# Patient Record
Sex: FEMALE | Race: WHITE | NOT HISPANIC OR LATINO | Employment: OTHER | URBAN - METROPOLITAN AREA
[De-identification: names, ages, dates, MRNs, and addresses within clinical notes are randomized per-mention and may not be internally consistent; named-entity substitution may affect disease eponyms.]

---

## 2017-04-25 LAB — HBA1C MFR BLD HPLC: 5.8 %

## 2018-10-04 ENCOUNTER — TRANSCRIBE ORDERS (OUTPATIENT)
Dept: ADMINISTRATIVE | Facility: HOSPITAL | Age: 68
End: 2018-10-04

## 2018-10-04 DIAGNOSIS — G47.30 SLEEP APNEA, UNSPECIFIED TYPE: Primary | ICD-10-CM

## 2018-10-25 ENCOUNTER — OFFICE VISIT (OUTPATIENT)
Dept: SLEEP CENTER | Facility: CLINIC | Age: 68
End: 2018-10-25
Payer: MEDICARE

## 2018-10-25 VITALS
HEIGHT: 65 IN | DIASTOLIC BLOOD PRESSURE: 70 MMHG | WEIGHT: 202 LBS | SYSTOLIC BLOOD PRESSURE: 120 MMHG | BODY MASS INDEX: 33.66 KG/M2 | HEART RATE: 88 BPM

## 2018-10-25 DIAGNOSIS — G47.33 OSA (OBSTRUCTIVE SLEEP APNEA): Primary | ICD-10-CM

## 2018-10-25 DIAGNOSIS — G47.30 SLEEP APNEA, UNSPECIFIED TYPE: ICD-10-CM

## 2018-10-25 PROCEDURE — 99203 OFFICE O/P NEW LOW 30 MIN: CPT | Performed by: INTERNAL MEDICINE

## 2018-10-25 RX ORDER — LAMOTRIGINE 200 MG/1
TABLET ORAL DAILY
COMMUNITY

## 2018-10-25 RX ORDER — DULOXETIN HYDROCHLORIDE 60 MG/1
CAPSULE, DELAYED RELEASE ORAL DAILY
COMMUNITY

## 2018-10-25 RX ORDER — PITAVASTATIN CALCIUM 2.09 MG/1
2 TABLET, FILM COATED ORAL DAILY
Refills: 3 | COMMUNITY
Start: 2018-10-18

## 2018-10-25 RX ORDER — SENNOSIDES 8.6 MG
TABLET ORAL DAILY
COMMUNITY

## 2018-10-25 RX ORDER — BUPROPION HYDROCHLORIDE 150 MG/1
150 TABLET ORAL 3 TIMES DAILY
Refills: 5 | COMMUNITY
Start: 2018-09-07

## 2018-10-25 RX ORDER — ARIPIPRAZOLE 2 MG/1
TABLET ORAL
COMMUNITY
End: 2019-02-07 | Stop reason: DRUGHIGH

## 2018-10-25 RX ORDER — FAMOTIDINE 20 MG/1
TABLET, FILM COATED ORAL
COMMUNITY

## 2019-02-07 ENCOUNTER — HOSPITAL ENCOUNTER (OUTPATIENT)
Dept: SLEEP CENTER | Facility: CLINIC | Age: 69
Discharge: HOME | End: 2019-02-07
Payer: MEDICARE

## 2019-02-07 ENCOUNTER — OFFICE VISIT (OUTPATIENT)
Dept: SLEEP CENTER | Facility: CLINIC | Age: 69
End: 2019-02-07
Payer: MEDICARE

## 2019-02-07 VITALS
SYSTOLIC BLOOD PRESSURE: 130 MMHG | DIASTOLIC BLOOD PRESSURE: 80 MMHG | WEIGHT: 208 LBS | HEIGHT: 65 IN | BODY MASS INDEX: 34.66 KG/M2 | HEART RATE: 92 BPM

## 2019-02-07 DIAGNOSIS — G47.31 COMPLEX SLEEP APNEA SYNDROME: ICD-10-CM

## 2019-02-07 DIAGNOSIS — G47.31 COMPLEX SLEEP APNEA SYNDROME: Primary | ICD-10-CM

## 2019-02-07 PROCEDURE — 99214 OFFICE O/P EST MOD 30 MIN: CPT | Performed by: INTERNAL MEDICINE

## 2019-02-07 PROCEDURE — 95811 POLYSOM 6/>YRS CPAP 4/> PARM: CPT

## 2019-02-07 RX ORDER — ARIPIPRAZOLE 10 MG/1
10 TABLET ORAL DAILY
Refills: 0 | COMMUNITY
Start: 2019-02-04

## 2019-02-07 NOTE — PROGRESS NOTES
Progress Note - Sleep Center   Rima Yadav QCR:0/79/7602 MRN: [id number]      Reason for Visit:    80 y  o female with complex apnea treated with ASV has been observed to have a high respiratory event index on her device  Assessment:  The patient feels drowsy with use the device, but is reportedly not snoring  Plan:  Obtain ASV titration study to make sure that settings are appropriate  Also, rule out any parasomnias such as periodic limb movement disorder  Follow up: Three months    History of Present Illness: The patient was evaluated and treated in [place] regarding complex sleep apnea  She is currently on ASV but has been noted to have a high respiratory event index up to 10, with increasing frequency  She was referred by her home care company for re-evaluation  Historical Information    Past Medical History: History reviewed  No pertinent past medical history  Past Surgical History: History reviewed  No pertinent surgical history  Social History - see chart  History   Alcohol use: Not on file       History   Smoking Status    Not on file   Smokeless Tobacco    Not on file     Family History: History reviewed  No pertinent family history      Medications/Allergies:      Current Outpatient Prescriptions:     ARIPiprazole (ABILIFY) 10 mg tablet, Take 10 mg by mouth daily, Disp: , Rfl: 0    aspirin 81 MG tablet, Take by mouth daily, Disp: , Rfl:     buPROPion (WELLBUTRIN XL) 150 mg 24 hr tablet, Take 150 mg by mouth 3 (three) times a day, Disp: , Rfl: 5    DULoxetine (CYMBALTA) 60 mg delayed release capsule, Take by mouth daily, Disp: , Rfl:     famotidine (PEPCID) 20 mg tablet, Take by mouth, Disp: , Rfl:     lamoTRIgine (LAMICTAL) 200 MG tablet, Take by mouth daily, Disp: , Rfl:     LIVALO 2 MG, Take 2 mg by mouth daily, Disp: , Rfl: 3    senna (SENOKOT) 8 6 mg, Take by mouth daily, Disp: , Rfl:       Objective    Vital Signs:   Vitals:    02/07/19 1300   BP: 130/80 Pulse: 92   Weight: 94 3 kg (208 lb)   Height: 5' 5" (1 651 m)     [place] Sleepiness Scale: Total score: 3    Physical Exam:    General: Alert, appropriate, cooperative, overweight    Head: NC/AT    Skin: Warm, dry    Neuro: No motor abnormalities, cranial nerves appear intact    Psych: Normal affect    I have reviewed and updated the review of systems as necessary        AMAURY Vang    Board Certified Sleep Specialist  Review of Systems      Genitourinary hot flashes at night and post menopausal (no peroids)   Cardiology none   Gastrointestinal none   Neurology none   Constitutional none   Integumentary none   Psychiatry anxiety and depression   Musculoskeletal muscle aches and back pain   Pulmonary snoring   ENT throat clearing   Endocrine none   Hematological none

## 2019-02-08 NOTE — PROGRESS NOTES
Sleep Study Documentation    Pre-Sleep Study       Sleep testing procedure explained to patient:YES    Patient napped prior to study:NO    Caffeine:Dayshift worker after 12PM   Caffeine use:NO    Alcohol:Dayshift workers after 5PM: Alcohol use:NO    Typical day for patient:YES       Study Documentation  Treatment   Optimal ASV pressure: EPAP MIN 13,  EPAP MX 15 CM   Leak:None  Snore:Eliminated  REM Obtained:no  Supplemental O2: no    Minimum SaO2 84%  Baseline SaO2 96%   PAP mask tried (list all)  Size Medium ResMed AirFit F20 FFM   PAP mask choice (final)  Size Medium ResMed AirFit F20 FFM   PAP mask type:full face  PAP pressure at which snoring was eliminated EPAP Max 15 cm /  EPAP Min 13 cm / PS Min 0 / PS Max 12 cm   Minimum SaO2 at final PAP pressure 84%    Mode of Therapy:ASV max EPAP: 15 cm   min EPAP:  13CM   max pressure support:12 CM   min pressure support:   0 cm   rate setting:  Auto   rise time: ----  Flex: OFF   time inspired: ----   max pressure:25 cm      ETCO2:No  CPAP changed to BiPAP:No      EKG abnormalities: no     EEG abnormalities: no    Study Terminated:no    Patient classification: unemployed      Post-Sleep Study    Medication used at bedtime or during sleep study:NO    Patient reports time it took to fall asleep:20 to 30 minutes    Patient reports waking up during study:3 or more times  Patient reports returning to sleep without difficulty  Patient reports sleeping 6 to 8 hours without dreaming  Patient reports sleep during study:typical    Patient rated sleepiness: Not sleepy or tired    PAP treatment:yes: Post PAP treatment patient reports feeling unchanged and would wear PAP mask at home

## 2019-02-12 ENCOUNTER — TELEPHONE (OUTPATIENT)
Dept: SLEEP CENTER | Facility: CLINIC | Age: 69
End: 2019-02-12

## 2019-02-12 DIAGNOSIS — G47.31 COMPLEX SLEEP APNEA SYNDROME: Primary | ICD-10-CM

## 2019-02-12 NOTE — TELEPHONE ENCOUNTER
Patient called, Dr Bienvenido Reaves asking what DME provider she uses, looking for CPAP settings  DME provider is [facility] from [place], [place]  I left message at [phone], asking for patients CPAP settings, advised to call [phone]

## 2019-02-14 ENCOUNTER — TELEPHONE (OUTPATIENT)
Dept: SLEEP CENTER | Facility: CLINIC | Age: 69
End: 2019-02-14

## 2019-02-14 NOTE — TELEPHONE ENCOUNTER
----- Message from Adelita Mcarthur MD sent at 2/12/2019  9:11 AM EST -----  ASV changes as noted    Thanks

## 2019-02-14 NOTE — TELEPHONE ENCOUNTER
I spoke with patient, advised Dr Suzy Bishop resulted sleep study and advised of pressure changes      Script, study and notes faxed to [facility] at [phone]

## 2019-02-14 NOTE — TELEPHONE ENCOUNTER
Left message for patient to call back to review sleep study      Need to notify patient of pressure changes and fax to DME provider [facility] at [phone] ([employer] [facility])

## 2019-02-18 ENCOUNTER — TELEPHONE (OUTPATIENT)
Dept: SLEEP CENTER | Facility: CLINIC | Age: 69
End: 2019-02-18

## 2019-02-18 NOTE — TELEPHONE ENCOUNTER
DME provider called-   1  There is no maximum pressure setting available on machine  2  Maximum EPAP setting can only be set as high as 15 cm H2O, instead of the ordered 17 cm H2O   3  Current settings: Min EPAP 4                                  Max EPAP 15  Machine was new in 2015    Please advise

## 2019-02-20 DIAGNOSIS — G47.31 COMPLEX SLEEP APNEA SYNDROME: Primary | ICD-10-CM

## 2019-02-22 ENCOUNTER — TELEPHONE (OUTPATIENT)
Dept: SLEEP CENTER | Facility: CLINIC | Age: 69
End: 2019-02-22

## 2019-02-22 DIAGNOSIS — G47.31 COMPLEX SLEEP APNEA SYNDROME: Primary | ICD-10-CM

## 2019-02-22 NOTE — TELEPHONE ENCOUNTER
The patient is machine is not functioning properly  She requires a new one  I will order an ASV at the appropriate settings

## 2019-02-22 NOTE — TELEPHONE ENCOUNTER
Patient reports having many episodes an hour since her pressure change on Monday  ASV 4-15  She feels like she is not getting enough air and can breath better without the machine  Requested compliance data from Dedrick Espino at [facility]

## 2019-02-22 NOTE — TELEPHONE ENCOUNTER
Script for new machine faxed to [phone]  Left message for patient that script for new machine was faxed and she should call with any questions or concerns

## 2019-03-01 NOTE — TELEPHONE ENCOUNTER
I spoke with patient, states she has not heard anything about her new machine  I advised I faxed 2/22/19, will refax  Advised patient to contact [facility] and call with any questions or concerns

## 2019-03-05 ENCOUNTER — TELEPHONE (OUTPATIENT)
Dept: SLEEP CENTER | Facility: CLINIC | Age: 69
End: 2019-03-05

## 2019-03-05 NOTE — TELEPHONE ENCOUNTER
PT's BiPAP machine unable to have maximum EPAP setting above 15 cm- you ordered a maximun EPAP setting of 18 cm    Please advise

## 2019-03-06 ENCOUNTER — TELEPHONE (OUTPATIENT)
Dept: SLEEP CENTER | Facility: CLINIC | Age: 69
End: 2019-03-06

## 2019-03-06 DIAGNOSIS — G47.31 COMPLEX SLEEP APNEA SYNDROME: Primary | ICD-10-CM

## 2019-03-06 NOTE — TELEPHONE ENCOUNTER
PT called- states she is tired of dealing with her current DME supplier from [place] & would like to switch to [facility]  She reports her current machine isn't functioning properly  Advised PT that [facility] cannot look at her machine, that the original DME provider will have to address the issues  She mentioned paying out of pocket for a new machine, as current machine is approx  332 years old  Advised PT that insurance will not cover cost of supplies if she doesn't go through insurance to obtain machine  PT will contact current DME provider to discuss concerns W current machine

## 2019-03-18 ENCOUNTER — TRANSCRIBE ORDERS (OUTPATIENT)
Dept: ADMINISTRATIVE | Age: 69
End: 2019-03-18

## 2019-03-18 ENCOUNTER — APPOINTMENT (OUTPATIENT)
Dept: LAB | Age: 69
End: 2019-03-18
Attending: PREVENTIVE MEDICINE

## 2019-03-18 DIAGNOSIS — Z02.1 PRE-EMPLOYMENT HEALTH SCREENING EXAMINATION: ICD-10-CM

## 2019-03-18 DIAGNOSIS — Z02.1 PRE-EMPLOYMENT HEALTH SCREENING EXAMINATION: Primary | ICD-10-CM

## 2019-03-18 LAB — RUBV IGG SERPL IA-ACNC: 167.2 IU/ML

## 2019-03-18 PROCEDURE — 86762 RUBELLA ANTIBODY: CPT

## 2019-03-18 PROCEDURE — 36415 COLL VENOUS BLD VENIPUNCTURE: CPT

## 2019-03-18 PROCEDURE — 86765 RUBEOLA ANTIBODY: CPT

## 2019-03-18 PROCEDURE — 86480 TB TEST CELL IMMUN MEASURE: CPT

## 2019-03-18 PROCEDURE — 86735 MUMPS ANTIBODY: CPT

## 2019-03-18 PROCEDURE — 86787 VARICELLA-ZOSTER ANTIBODY: CPT

## 2019-03-19 LAB
MEV IGG SER QL: NORMAL
MUV IGG SER QL: NORMAL

## 2019-03-20 LAB
GAMMA INTERFERON BACKGROUND BLD IA-ACNC: 0.55 IU/ML
M TB IFN-G BLD-IMP: NEGATIVE
M TB IFN-G CD4+ BCKGRND COR BLD-ACNC: -0.34 IU/ML
M TB IFN-G CD4+ BCKGRND COR BLD-ACNC: 0.04 IU/ML
MITOGEN IGNF BCKGRD COR BLD-ACNC: >10 IU/ML

## 2019-03-21 LAB — VZV IGG SER IA-ACNC: NORMAL

## 2019-05-08 ENCOUNTER — OFFICE VISIT (OUTPATIENT)
Dept: SLEEP CENTER | Facility: CLINIC | Age: 69
End: 2019-05-08
Payer: MEDICARE

## 2019-05-08 VITALS
SYSTOLIC BLOOD PRESSURE: 116 MMHG | BODY MASS INDEX: 33.82 KG/M2 | DIASTOLIC BLOOD PRESSURE: 82 MMHG | WEIGHT: 203 LBS | HEIGHT: 65 IN

## 2019-05-08 DIAGNOSIS — G47.31 COMPLEX SLEEP APNEA SYNDROME: ICD-10-CM

## 2019-05-08 DIAGNOSIS — G47.33 OSA (OBSTRUCTIVE SLEEP APNEA): Primary | ICD-10-CM

## 2019-05-08 PROCEDURE — 99213 OFFICE O/P EST LOW 20 MIN: CPT | Performed by: INTERNAL MEDICINE

## 2019-06-17 ENCOUNTER — OFFICE VISIT (OUTPATIENT)
Dept: BARIATRICS | Facility: CLINIC | Age: 69
End: 2019-06-17
Payer: MEDICARE

## 2019-06-17 VITALS
SYSTOLIC BLOOD PRESSURE: 130 MMHG | RESPIRATION RATE: 16 BRPM | WEIGHT: 208.2 LBS | TEMPERATURE: 98.4 F | HEIGHT: 65 IN | DIASTOLIC BLOOD PRESSURE: 70 MMHG | HEART RATE: 86 BPM | BODY MASS INDEX: 34.69 KG/M2

## 2019-06-17 DIAGNOSIS — G47.31 COMPLEX SLEEP APNEA SYNDROME: ICD-10-CM

## 2019-06-17 DIAGNOSIS — Z13.1 SCREENING FOR DIABETES MELLITUS: ICD-10-CM

## 2019-06-17 DIAGNOSIS — R63.5 ABNORMAL WEIGHT GAIN: ICD-10-CM

## 2019-06-17 DIAGNOSIS — Z13.29 SCREENING FOR THYROID DISORDER: ICD-10-CM

## 2019-06-17 DIAGNOSIS — E66.9 OBESITY, CLASS I, BMI 30-34.9: Primary | ICD-10-CM

## 2019-06-17 DIAGNOSIS — G47.33 OSA (OBSTRUCTIVE SLEEP APNEA): ICD-10-CM

## 2019-06-17 DIAGNOSIS — Z13.220 SCREENING CHOLESTEROL LEVEL: ICD-10-CM

## 2019-06-17 PROBLEM — E66.811 OBESITY, CLASS I, BMI 30-34.9: Status: ACTIVE | Noted: 2019-06-17

## 2019-06-17 PROCEDURE — 99204 OFFICE O/P NEW MOD 45 MIN: CPT | Performed by: FAMILY MEDICINE

## 2019-07-05 ENCOUNTER — APPOINTMENT (OUTPATIENT)
Dept: LAB | Facility: CLINIC | Age: 69
End: 2019-07-05
Payer: MEDICARE

## 2019-07-05 ENCOUNTER — TRANSCRIBE ORDERS (OUTPATIENT)
Dept: LAB | Facility: CLINIC | Age: 69
End: 2019-07-05

## 2019-07-05 DIAGNOSIS — Z13.1 SCREENING FOR DIABETES MELLITUS: ICD-10-CM

## 2019-07-05 DIAGNOSIS — R63.5 ABNORMAL WEIGHT GAIN: ICD-10-CM

## 2019-07-05 DIAGNOSIS — G47.33 OSA (OBSTRUCTIVE SLEEP APNEA): ICD-10-CM

## 2019-07-05 DIAGNOSIS — Z13.220 SCREENING CHOLESTEROL LEVEL: ICD-10-CM

## 2019-07-05 DIAGNOSIS — E66.9 OBESITY, CLASS I, BMI 30-34.9: ICD-10-CM

## 2019-07-05 DIAGNOSIS — Z13.29 SCREENING FOR THYROID DISORDER: ICD-10-CM

## 2019-07-05 DIAGNOSIS — G47.31 COMPLEX SLEEP APNEA SYNDROME: ICD-10-CM

## 2019-07-05 LAB
ALBUMIN SERPL BCP-MCNC: 3.6 G/DL (ref 3.5–5)
ALP SERPL-CCNC: 153 U/L (ref 46–116)
ALT SERPL W P-5'-P-CCNC: 30 U/L (ref 12–78)
ANION GAP SERPL CALCULATED.3IONS-SCNC: 4 MMOL/L (ref 4–13)
AST SERPL W P-5'-P-CCNC: 17 U/L (ref 5–45)
BASOPHILS # BLD AUTO: 0.06 THOUSANDS/ΜL (ref 0–0.1)
BASOPHILS NFR BLD AUTO: 1 % (ref 0–1)
BILIRUB SERPL-MCNC: 0.46 MG/DL (ref 0.2–1)
BUN SERPL-MCNC: 14 MG/DL (ref 5–25)
CALCIUM SERPL-MCNC: 8.7 MG/DL (ref 8.3–10.1)
CHLORIDE SERPL-SCNC: 111 MMOL/L (ref 100–108)
CHOLEST SERPL-MCNC: 144 MG/DL (ref 50–200)
CO2 SERPL-SCNC: 26 MMOL/L (ref 21–32)
CREAT SERPL-MCNC: 1.1 MG/DL (ref 0.6–1.3)
EOSINOPHIL # BLD AUTO: 0.29 THOUSAND/ΜL (ref 0–0.61)
EOSINOPHIL NFR BLD AUTO: 3 % (ref 0–6)
ERYTHROCYTE [DISTWIDTH] IN BLOOD BY AUTOMATED COUNT: 13.3 % (ref 11.6–15.1)
EST. AVERAGE GLUCOSE BLD GHB EST-MCNC: 126 MG/DL
GFR SERPL CREATININE-BSD FRML MDRD: 52 ML/MIN/1.73SQ M
GLUCOSE P FAST SERPL-MCNC: 115 MG/DL (ref 65–99)
HBA1C MFR BLD: 6 % (ref 4.2–6.3)
HCT VFR BLD AUTO: 43.2 % (ref 34.8–46.1)
HDLC SERPL-MCNC: 52 MG/DL (ref 40–60)
HGB BLD-MCNC: 13.6 G/DL (ref 11.5–15.4)
IMM GRANULOCYTES # BLD AUTO: 0.02 THOUSAND/UL (ref 0–0.2)
IMM GRANULOCYTES NFR BLD AUTO: 0 % (ref 0–2)
LDLC SERPL CALC-MCNC: 68 MG/DL (ref 0–100)
LYMPHOCYTES # BLD AUTO: 3.12 THOUSANDS/ΜL (ref 0.6–4.47)
LYMPHOCYTES NFR BLD AUTO: 37 % (ref 14–44)
MCH RBC QN AUTO: 28.8 PG (ref 26.8–34.3)
MCHC RBC AUTO-ENTMCNC: 31.5 G/DL (ref 31.4–37.4)
MCV RBC AUTO: 91 FL (ref 82–98)
MONOCYTES # BLD AUTO: 0.55 THOUSAND/ΜL (ref 0.17–1.22)
MONOCYTES NFR BLD AUTO: 7 % (ref 4–12)
NEUTROPHILS # BLD AUTO: 4.4 THOUSANDS/ΜL (ref 1.85–7.62)
NEUTS SEG NFR BLD AUTO: 52 % (ref 43–75)
NRBC BLD AUTO-RTO: 0 /100 WBCS
PLATELET # BLD AUTO: 286 THOUSANDS/UL (ref 149–390)
PMV BLD AUTO: 9.8 FL (ref 8.9–12.7)
POTASSIUM SERPL-SCNC: 4.1 MMOL/L (ref 3.5–5.3)
PROT SERPL-MCNC: 7.1 G/DL (ref 6.4–8.2)
RBC # BLD AUTO: 4.73 MILLION/UL (ref 3.81–5.12)
SODIUM SERPL-SCNC: 141 MMOL/L (ref 136–145)
TRIGL SERPL-MCNC: 121 MG/DL
TSH SERPL DL<=0.05 MIU/L-ACNC: 2 UIU/ML (ref 0.36–3.74)
WBC # BLD AUTO: 8.44 THOUSAND/UL (ref 4.31–10.16)

## 2019-07-05 PROCEDURE — 80061 LIPID PANEL: CPT

## 2019-07-05 PROCEDURE — 36415 COLL VENOUS BLD VENIPUNCTURE: CPT

## 2019-07-05 PROCEDURE — 83036 HEMOGLOBIN GLYCOSYLATED A1C: CPT

## 2019-07-05 PROCEDURE — 84443 ASSAY THYROID STIM HORMONE: CPT

## 2019-07-05 PROCEDURE — 85025 COMPLETE CBC W/AUTO DIFF WBC: CPT

## 2019-07-05 PROCEDURE — 80053 COMPREHEN METABOLIC PANEL: CPT

## 2019-07-11 ENCOUNTER — OFFICE VISIT (OUTPATIENT)
Dept: BARIATRICS | Facility: CLINIC | Age: 69
End: 2019-07-11

## 2019-07-11 VITALS — BODY MASS INDEX: 34.09 KG/M2 | WEIGHT: 204.6 LBS | HEIGHT: 65 IN

## 2019-07-11 DIAGNOSIS — R63.5 ABNORMAL WEIGHT GAIN: ICD-10-CM

## 2019-07-11 PROCEDURE — RECHECK

## 2019-07-11 PROCEDURE — WMPFE WEIGHT MANAGEMENT PRO FEE EMPLOYEE

## 2019-07-11 NOTE — PROGRESS NOTES
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  Would like to use a meal replacement at breakfast and bar at lunch  Guidelines for dining out discussed  She has used food logging in the past with the [employer] It zoë  Meal plan provided  Anthropometric Measurements  Start Weight (lbs): 204 6 lbs   Ideal Body Weight (lbs): 125 lbs  Goal Weight (lbs): 160 lbs  Highest: 208 lbs  Lowest:  135 lbs  UBW: 160 lbs    Weight Loss History  Previous weight loss attempts: Commercial Programs ([employer], Romana Pickard, etc )  Exercise  Self Created Diets (Portion Control, Healthy Food Choices, etc )    Food and Nutrition Related History  Wake up: 7-8   Bed Time: 10-11    Food Recall  Breakfast: 7:30-8:30: english muffin w/ 2 tsp butter & jam & coffee   Snack: skip  Lunch: 12:00: out a lot (taco or 4 oz salmon, rice, asparagus OR 4-5 shrimp, salsa, rice) OR if at home salad OR frozen meal OR fruit, nonfat greek yogurt OR soup  Snack: skip  Dinner: 5-6 p m   As in lunch OR 4-6 oz chicken, broccoli w/canola oil, 1/2 baked potato w/butter, 0-2/MG dessert like slice of cake or 1/2 restaurant dessert   Snack: skip    Beverages: water, sugar free beverages and coffee/tea w/low fat half & half, seltzer   Volume of beverage intake: ?    Weekends: Same  Cravings: sweets  Trouble area of day:n/a    Frequency of Eating out: most days  Food restrictions: n/a  Cooking:   Food Shopping: both    Physical Activity Intake  Activity:none  Frequency:n/a  Physical limitations/barriers to exercise: n/a    Estimated Needs  Energy  Tanita: BMR: 1581     X 1 3 -1000 =1055  [employer] Needs: BMR : 0434   1-1 5# loss weekly sedentary:  5663-4784             1-2# loss weekly lightly active: 6479-0699  Protein:68-85 gm      (1 2-1 5g/kg IBW)  Fluid: 66 oz     (35mL/kg IBW)    Nutrition Diagnosis  Yes; Overweight/obesity  related to Excess energy intake as evidenced by  BMI more than normative standard for age and sex (obesity-grade I 26-30  9)       Nutrition Intervention    Nutrition Prescription  Calories:900-1500 (goal 1200)  Protein: ~85 gm    Meal Plan  Breakfast: 200, 27  Snack: 0-150, 0-10  Lunch: 220-500, 21-28  Snack 100-150, 5-10  Dinner: 300-500, 21-28  Snack: 100-150, 5-10    Nutrition Education:    Healthy Core Manual  Calorie controlled menu  Lean protein food choices  Healthy snack options  Food journaling tips  Dining out    Nutrition Counseling:  Strategies: meal planning, portion sizes, healthy snack choices, hydration, fiber intake, protein intake, exercise, food journal      Monitoring and Evaluation:  Evaluation criteria:  Energy Intake  Meet protein needs  Maintain adequate hydration  Monitor weekly weight  Meal planning/preparation  Food journal   Decreased portions at mealtimes and snacks  Physical activity     Barriers to learning:none  Readiness to change: Preparation  Comprehension: good  Expected Compliance: good

## 2019-07-18 ENCOUNTER — CLINICAL SUPPORT (OUTPATIENT)
Dept: BARIATRICS | Facility: CLINIC | Age: 69
End: 2019-07-18

## 2019-07-18 VITALS — WEIGHT: 205.4 LBS | BODY MASS INDEX: 34.22 KG/M2 | HEIGHT: 65 IN

## 2019-07-18 DIAGNOSIS — R63.5 ABNORMAL WEIGHT GAIN: Primary | ICD-10-CM

## 2019-07-18 PROCEDURE — RECHECK

## 2019-07-25 ENCOUNTER — CLINICAL SUPPORT (OUTPATIENT)
Dept: BARIATRICS | Facility: CLINIC | Age: 69
End: 2019-07-25

## 2019-07-25 VITALS — WEIGHT: 204 LBS | BODY MASS INDEX: 33.99 KG/M2 | HEIGHT: 65 IN

## 2019-07-25 DIAGNOSIS — R63.5 ABNORMAL WEIGHT GAIN: Primary | ICD-10-CM

## 2019-07-25 PROCEDURE — RECHECK

## 2019-07-31 NOTE — PROGRESS NOTES
Weight Management Medical Nutrition Assessment  Is here for f/u RD visit for [employer] Core program  Current wt: 200 4 lbs  Loss of 4 2 lbs x 2 wks  No concerns  Using meal replacement often for breakfast and bar at lunch  Feels cravings have been managed  Water intake improving  Recently joined the gym and looking forward to starting  Using the [employer] it zoë for tracking  Anthropometric Measurements  Start Weight (lbs): 204 6 lbs  Current wt: 200 4  %TBW loss: 2 1%   Ideal Body Weight (lbs): 125 lbs  Goal Weight (lbs): 160 lbs  Highest: 208 lbs  Lowest:  135 lbs  UBW: 160 lbs    Weight Loss History  Previous weight loss attempts: Commercial Programs ([employer], Hubert Rojas, etc )  Exercise  Self Created Diets (Portion Control, Healthy Food Choices, etc )    Food and Nutrition Related History  Wake up: 7-8   Bed Time: 10-11    Food Recall  Breakfast: 7:30-8:30: meal replacement OR mcdonalds egg burrito    Snack: skip  Lunch: 12:00:bar, freeze dried pineapple, other fruit  Snack: skip  Dinner: 5-6 p m  4-5 oz salmon/chicken or beyond burger, veggies or peas, baked potato  Snack: 2 tbsp pb, apple    Beverages: water, sugar free beverages and coffee/tea w/low fat half & half, seltzer   Volume of beverage intake: 48 oz    Weekends: Same  Cravings: sweets  Trouble area of day:n/a    Frequency of Eating out: most days  Food restrictions: n/a  Cooking:   Food Shopping: both    Physical Activity Intake  Activity: walking  Frequency: just starting  Physical limitations/barriers to exercise: n/a    Estimated Needs  Energy  [employer] Energy Needs: BMR : 1440  1-1 5# loss weekly sedentary:  312-3275             1-2# loss weekly lightly active: 980-1480  Protein:68-85 gm      (1 2-1 5g/kg IBW)  Fluid: 66 oz     (35mL/kg IBW)    Nutrition Diagnosis  Yes; Overweight/obesity  related to Excess energy intake as evidenced by  BMI more than normative standard for age and sex (obesity-grade I 26-30  9) Nutrition Intervention    Nutrition Prescription  Calories:900-1500 (goal 1200)  Protein: ~85 gm    Meal Plan  Breakfast: 200, 27  Snack: 0-150, 0-10  Lunch: 220-500, 21-28  Snack 100-150, 5-10  Dinner: 300-500, 21-28  Snack: 100-150, 5-10    Nutrition Education:    Healthy Core Manual  Calorie controlled menu  Lean protein food choices  Healthy snack options  Food journaling tips  Dining out    Nutrition Counseling:  Strategies: meal planning, portion sizes, healthy snack choices, hydration, fiber intake, protein intake, exercise, food journal      Monitoring and Evaluation:  Evaluation criteria:  Energy Intake  Meet protein needs  Maintain adequate hydration  Monitor weekly weight  Meal planning/preparation  Food journal   Decreased portions at mealtimes and snacks  Physical activity     Barriers to learning:none  Readiness to change: Preparation  Comprehension: good  Expected Compliance: good

## 2019-08-01 ENCOUNTER — OFFICE VISIT (OUTPATIENT)
Dept: BARIATRICS | Facility: CLINIC | Age: 69
End: 2019-08-01

## 2019-08-01 VITALS — WEIGHT: 200.4 LBS | HEIGHT: 65 IN | BODY MASS INDEX: 33.39 KG/M2

## 2019-08-01 DIAGNOSIS — R63.5 ABNORMAL WEIGHT GAIN: Primary | ICD-10-CM

## 2019-08-01 PROCEDURE — RECHECK

## 2019-08-08 ENCOUNTER — CLINICAL SUPPORT (OUTPATIENT)
Dept: BARIATRICS | Facility: CLINIC | Age: 69
End: 2019-08-08

## 2019-08-08 VITALS — BODY MASS INDEX: 33.55 KG/M2 | HEIGHT: 65 IN | WEIGHT: 201.4 LBS

## 2019-08-08 DIAGNOSIS — R63.5 ABNORMAL WEIGHT GAIN: Primary | ICD-10-CM

## 2019-08-08 PROCEDURE — RECHECK

## 2019-08-15 ENCOUNTER — CLINICAL SUPPORT (OUTPATIENT)
Dept: BARIATRICS | Facility: CLINIC | Age: 69
End: 2019-08-15

## 2019-08-15 VITALS — HEIGHT: 65 IN | WEIGHT: 200.6 LBS | BODY MASS INDEX: 33.42 KG/M2

## 2019-08-15 DIAGNOSIS — R63.5 ABNORMAL WEIGHT GAIN: Primary | ICD-10-CM

## 2019-08-15 PROCEDURE — RECHECK

## 2019-08-22 ENCOUNTER — CLINICAL SUPPORT (OUTPATIENT)
Dept: BARIATRICS | Facility: CLINIC | Age: 69
End: 2019-08-22

## 2019-08-22 VITALS — WEIGHT: 198.6 LBS | BODY MASS INDEX: 33.09 KG/M2 | HEIGHT: 65 IN

## 2019-08-22 DIAGNOSIS — R63.5 ABNORMAL WEIGHT GAIN: Primary | ICD-10-CM

## 2019-08-22 PROCEDURE — RECHECK

## 2019-08-29 ENCOUNTER — CLINICAL SUPPORT (OUTPATIENT)
Dept: BARIATRICS | Facility: CLINIC | Age: 69
End: 2019-08-29

## 2019-08-29 VITALS — HEIGHT: 65 IN | WEIGHT: 197 LBS | BODY MASS INDEX: 32.82 KG/M2

## 2019-08-29 DIAGNOSIS — R63.5 ABNORMAL WEIGHT GAIN: Primary | ICD-10-CM

## 2019-08-29 PROCEDURE — RECHECK

## 2019-09-05 ENCOUNTER — CLINICAL SUPPORT (OUTPATIENT)
Dept: BARIATRICS | Facility: CLINIC | Age: 69
End: 2019-09-05

## 2019-09-05 VITALS — BODY MASS INDEX: 32.59 KG/M2 | HEIGHT: 65 IN | WEIGHT: 195.6 LBS

## 2019-09-05 DIAGNOSIS — R63.5 ABNORMAL WEIGHT GAIN: Primary | ICD-10-CM

## 2019-09-05 PROCEDURE — RECHECK

## 2019-09-10 ENCOUNTER — TRANSCRIBE ORDERS (OUTPATIENT)
Dept: ADMINISTRATIVE | Facility: HOSPITAL | Age: 69
End: 2019-09-10

## 2019-09-10 DIAGNOSIS — Z12.39 BREAST SCREENING, UNSPECIFIED: Primary | ICD-10-CM

## 2019-09-12 ENCOUNTER — OFFICE VISIT (OUTPATIENT)
Dept: BARIATRICS | Facility: CLINIC | Age: 69
End: 2019-09-12

## 2019-09-12 VITALS — BODY MASS INDEX: 32.47 KG/M2 | HEIGHT: 65 IN | WEIGHT: 194.9 LBS

## 2019-09-12 DIAGNOSIS — R63.5 ABNORMAL WEIGHT GAIN: Primary | ICD-10-CM

## 2019-09-12 PROCEDURE — RECHECK

## 2019-09-12 NOTE — PROGRESS NOTES
Weight Management Medical Nutrition Assessment  Is here for f/u RD visit for Healthy Core program  Current wt:194 9 lbs  Loss of 9 7 lbs x 2 months  No concerns  Water intake has improved  Continues to use the lose it zoë for tracking and consuming ~1300 calories/day  Hunger level managed  She has incorporated walking into her week  Anthropometric Measurements  Start Weight (lbs): 204 6 lbs  Current wt: 194 9  %TBW loss: 5%   Ideal Body Weight (lbs): 125 lbs  Goal Weight (lbs): 160 lbs  Highest: 208 lbs  Lowest:  135 lbs  UBW: 160 lbs    Weight Loss History  Previous weight loss attempts: Commercial Programs ([employer], Ayesha Perry, etc )  Exercise  Self Created Diets (Portion Control, Healthy Food Choices, etc )    Food and Nutrition Related History  Wake up: 7-8   Bed Time: 10-11    Food Recall  Breakfast: 7:30-8:30: bar   Snack: skip  Lunch: 12:00: 7 grain light bread, 2 oz ham salad, 1/2 cup fruit, freeze dried fruits  Snack: skip  Dinner: 5-6 p m  4 oz salmon/chicken,  veggies or peas, baked potato or pasta   Snack:  string cheese OR yogurt OR meal replacement w/fruit    Beverages: water, sugar free beverages and coffee/tea w/low fat half & half, seltzer   Volume of beverage intake: 72 oz    Weekends: Same  Cravings: sweets  Trouble area of day:n/a    Frequency of Eating out: 4-5x/wk  Food restrictions: n/a  Cooking:   Food Shopping: both    Physical Activity Intake  Activity: walking 30 minutes  Frequency: 3x/wk  Physical limitations/barriers to exercise: n/a    Estimated Needs  Energy  [employer] Energy Needs: BMR : 6706  1-1 5# loss weekly sedentary:  672-1515            1-2# loss weekly lightly active: 8667-2000  Protein:68-85 gm      (1 2-1 5g/kg IBW)  Fluid: 66 oz     (35mL/kg IBW)    Nutrition Diagnosis  Yes; Overweight/obesity  related to Excess energy intake as evidenced by  BMI more than normative standard for age and sex (obesity-grade I 26-30  9)       Nutrition Intervention    Nutrition Prescription  Calories:900-1500 (goal 1200)  Protein: ~85 gm    Meal Plan  Breakfast: 200, 27  Snack: 0-150, 0-10  Lunch: 220-500, 21-28  Snack 100-150, 5-10  Dinner: 300-500, 21-28  Snack: 100-150, 5-10    Nutrition Education:    Healthy Core Manual  Calorie controlled menu  Lean protein food choices  Healthy snack options  Food journaling tips  Dining out    Nutrition Counseling:  Strategies: meal planning, portion sizes, healthy snack choices, hydration, fiber intake, protein intake, exercise, food journal      Monitoring and Evaluation:  Evaluation criteria:  Energy Intake  Meet protein needs  Maintain adequate hydration  Monitor weekly weight  Meal planning/preparation  Food journal   Decreased portions at mealtimes and snacks  Physical activity     Barriers to learning:none  Readiness to change: Action  Comprehension: very good  Expected Compliance: very good

## 2019-09-26 ENCOUNTER — CLINICAL SUPPORT (OUTPATIENT)
Dept: BARIATRICS | Facility: CLINIC | Age: 69
End: 2019-09-26

## 2019-09-26 VITALS — BODY MASS INDEX: 32.52 KG/M2 | HEIGHT: 65 IN | WEIGHT: 195.2 LBS

## 2019-09-26 DIAGNOSIS — R63.5 ABNORMAL WEIGHT GAIN: Primary | ICD-10-CM

## 2019-09-26 PROCEDURE — RECHECK

## 2019-10-03 ENCOUNTER — CLINICAL SUPPORT (OUTPATIENT)
Dept: BARIATRICS | Facility: CLINIC | Age: 69
End: 2019-10-03

## 2019-10-03 VITALS — HEIGHT: 65 IN | BODY MASS INDEX: 32.65 KG/M2 | WEIGHT: 196 LBS

## 2019-10-03 DIAGNOSIS — R63.5 ABNORMAL WEIGHT GAIN: Primary | ICD-10-CM

## 2019-10-03 PROCEDURE — RECHECK

## 2019-10-10 ENCOUNTER — CLINICAL SUPPORT (OUTPATIENT)
Dept: BARIATRICS | Facility: CLINIC | Age: 69
End: 2019-10-10

## 2019-10-10 VITALS — BODY MASS INDEX: 32.39 KG/M2 | HEIGHT: 65 IN | WEIGHT: 194.4 LBS

## 2019-10-10 DIAGNOSIS — R63.5 ABNORMAL WEIGHT GAIN: Primary | ICD-10-CM

## 2019-10-10 PROCEDURE — RECHECK

## 2019-10-24 ENCOUNTER — CLINICAL SUPPORT (OUTPATIENT)
Dept: BARIATRICS | Facility: CLINIC | Age: 69
End: 2019-10-24

## 2019-10-24 VITALS — HEIGHT: 65 IN | BODY MASS INDEX: 32.32 KG/M2 | WEIGHT: 194 LBS

## 2019-10-24 DIAGNOSIS — R63.5 ABNORMAL WEIGHT GAIN: Primary | ICD-10-CM

## 2019-10-24 PROCEDURE — RECHECK

## 2019-10-28 NOTE — PROGRESS NOTES
Weight Management Medical Nutrition Assessment  Is here for f/u RD visit for Healthy Core program with noelleita and REE  Current wt: 192 4 lbs  Loss of 12 2  lbs x 3 1/2 months  Tanita shows fat loss of 9 2 lbs (75%)  REE WNL  Continues to use the lose it zoë for tracking and consuming ~1300 calories/day  Hunger level managed  She continues to walk 3x/wk for 30 minutes  Options for f/u discussed  Anthropometric Measurements  Start Weight (lbs): 204 6 lbs  Current wt:  192 4  %TBW loss: 6%   Ideal Body Weight (lbs): 125 lbs  Goal Weight (lbs): 160 lbs  Highest: 208 lbs  Lowest:  135 lbs  UBW: 160 lbs    Weight Loss History  Previous weight loss attempts: Commercial Programs ([employer], Mimi Daley, etc )  Exercise  Self Created Diets (Portion Control, Healthy Food Choices, etc )    Food and Nutrition Related History  Wake up: 7-8   Bed Time: 10-11    Food Recall  Breakfast: 7:30-8:30: bar   Snack: skip  Lunch: 12:00: dinner type meal  OR Healthy choice power bowl  Snack: skip  Dinner: 5-6 p m  4 oz salmon/chicken,  veggies or peas, baked potato or pasta OR if heavier meal at lunch will have egg s/w or turkey, cheddar, apple s/w  Snack:  Skip OR light bread w/pb    Beverages: water, sugar free beverages and coffee/tea w/low fat half & half, seltzer   Volume of beverage intake: 90 oz    Weekends: Same  Cravings: sweets  Trouble area of day:n/a    Frequency of Eating out: 4-5x/wk  Food restrictions: n/a  Cooking:   Food Shopping: both    Physical Activity Intake  Activity: walking 30 minutes  Frequency: 3x/wk  Physical limitations/barriers to exercise: n/a    Estimated Needs  Energy  Kallie BMR 1529 x1 3-8594=825  REE 1483, wt loss w/o exercise [id number], with exercise [id number]  [employer] Energy Needs:  BMR 1404 : 1   1-1 5# loss weekly sedentary:    720-1706          1-2# loss weekly lightly active:  930-1430  Protein:68-85 gm      (1 2-1 5g/kg IBW)  Fluid: 66 oz     (35mL/kg IBW)    Nutrition Diagnosis  Yes; Overweight/obesity  related to Excess energy intake as evidenced by  BMI more than normative standard for age and sex (obesity-grade I 26-30  9)       Nutrition Intervention    Nutrition Prescription  Calories:900-1500 (goal 1200)  Protein: ~85 gm    Meal Plan  Breakfast: 200, 27  Snack: 0-150, 0-10  Lunch: 220-500, 21-28  Snack 100-150, 5-10  Dinner: 300-500, 21-28  Snack: 100-150, 5-10    Nutrition Education:    Healthy Core Manual  Calorie controlled menu  Lean protein food choices  Healthy snack options  Food journaling tips  Dining out    Nutrition Counseling:  Strategies: meal planning, portion sizes, healthy snack choices, hydration, fiber intake, protein intake, exercise, food journal      Monitoring and Evaluation:  Evaluation criteria:  Energy Intake  Meet protein needs  Maintain adequate hydration  Monitor weekly weight  Meal planning/preparation  Food journal   Decreased portions at mealtimes and snacks  Physical activity     Barriers to learning:none  Readiness to change: Action  Comprehension: very good  Expected Compliance: very good

## 2019-10-29 ENCOUNTER — OFFICE VISIT (OUTPATIENT)
Dept: BARIATRICS | Facility: CLINIC | Age: 69
End: 2019-10-29

## 2019-10-29 VITALS — WEIGHT: 192.4 LBS | BODY MASS INDEX: 32.06 KG/M2 | HEIGHT: 65 IN

## 2019-10-29 DIAGNOSIS — R63.5 ABNORMAL WEIGHT GAIN: Primary | ICD-10-CM

## 2019-10-29 PROCEDURE — RECHECK

## 2019-10-31 ENCOUNTER — CLINICAL SUPPORT (OUTPATIENT)
Dept: BARIATRICS | Facility: CLINIC | Age: 69
End: 2019-10-31

## 2019-10-31 VITALS — HEIGHT: 65 IN | BODY MASS INDEX: 32.36 KG/M2 | WEIGHT: 194.2 LBS

## 2019-10-31 DIAGNOSIS — R63.5 ABNORMAL WEIGHT GAIN: Primary | ICD-10-CM

## 2019-10-31 PROCEDURE — RECHECK

## 2019-11-07 ENCOUNTER — CLINICAL SUPPORT (OUTPATIENT)
Dept: BARIATRICS | Facility: CLINIC | Age: 69
End: 2019-11-07

## 2019-11-07 VITALS — HEIGHT: 65 IN | WEIGHT: 193.4 LBS | BODY MASS INDEX: 32.22 KG/M2

## 2019-11-07 DIAGNOSIS — R63.5 ABNORMAL WEIGHT GAIN: Primary | ICD-10-CM

## 2019-11-07 PROCEDURE — RECHECK

## 2019-11-21 ENCOUNTER — CLINICAL SUPPORT (OUTPATIENT)
Dept: BARIATRICS | Facility: CLINIC | Age: 69
End: 2019-11-21

## 2019-11-21 VITALS — BODY MASS INDEX: 31.92 KG/M2 | HEIGHT: 65 IN | WEIGHT: 191.6 LBS

## 2019-11-21 DIAGNOSIS — R63.5 ABNORMAL WEIGHT GAIN: Primary | ICD-10-CM

## 2019-11-21 PROCEDURE — RECHECK

## 2019-11-26 ENCOUNTER — OFFICE VISIT (OUTPATIENT)
Dept: BARIATRICS | Facility: CLINIC | Age: 69
End: 2019-11-26

## 2019-11-26 VITALS — WEIGHT: 193.3 LBS | HEIGHT: 65 IN | BODY MASS INDEX: 32.21 KG/M2

## 2019-11-26 DIAGNOSIS — R63.5 ABNORMAL WEIGHT GAIN: Primary | ICD-10-CM

## 2019-11-26 PROCEDURE — RECHECK

## 2019-11-26 NOTE — PROGRESS NOTES
Weight Management Medical Nutrition Assessment  Is here for f/u RD visit for [facility]  Current wt:  193 3 lbs  Loss of   11 3 lbs x 4 1/2 months  Reports weight is increased this week because she went to a buffet and ate in excess  States that buffets are a trigger for her but she doesn't intend on going to one any time soon  Upcoming holiday discussed  Continues to use the [employer] it zoë for tracking and consuming ~1300 calories/day  Hunger level managed  She continues to walk 3x/wk for 30 minutes with goal for 4x/wk  Anthropometric Measurements  Start Weight (lbs): 204 6 lbs  Current wt:  193 3 lbs  %TBW loss: 5 5%   Ideal Body Weight (lbs): 125 lbs  Goal Weight (lbs): 160 lbs  Highest: 208 lbs  Lowest:  135 lbs  UBW: 160 lbs    Weight Loss History  Previous weight loss attempts: Commercial Programs ([employer], Seble Osei, etc )  Exercise  Self Created Diets (Portion Control, Healthy Food Choices, etc )    Food and Nutrition Related History  Wake up: 7-8   Bed Time: 10-11    Food Recall  Breakfast: 7:30-8:30: bar   Snack: skip  Lunch: 12:00: 2 oz pork, broccoli, pasta w/sauce  OR Healthy choice power bowl  Snack: skip   Dinner: 5-6 p m   Salad w/1 slice dumont, fruit, italian dressing cut with water OR 4 oz salmon/chicken,  veggies or peas, baked potato or pasta OR if heavier meal at lunch will have egg s/w or turkey, cheddar, apple s/w  Snack:  Skip OR 2 light bread w/pb    Beverages: water, sugar free beverages and coffee/tea w/low fat half & half, seltzer   Volume of beverage intake: 90 oz    Weekends: Same  Cravings: sweets  Trouble area of day:n/a    Frequency of Eating out: 4-5x/wk  Food restrictions: n/a  Cooking:   Food Shopping: both    Physical Activity Intake  Activity: walking 30 minutes  Frequency: 3x/wk  Physical limitations/barriers to exercise: n/a    Estimated Needs  Energy  Kallie BMR 1529 x1 3-5839=135  REE 1483, wt loss w/o exercise 2049-7475, with exercise 1668-4780  [place] [address] [employer] Energy Needs: R 1404 : 1   1-1 5# loss weekly sedentary:    164-1719          1-2# loss weekly lightly active:  930-1430  Protein:68-85 gm      (1 2-1 5g/kg IBW)  Fluid: 66 oz     (35mL/kg IBW)    Nutrition Diagnosis  Yes; Overweight/obesity  related to Excess energy intake as evidenced by  BMI more than normative standard for age and sex (obesity-grade I 26-30  9)       Nutrition Intervention    Nutrition Prescription  Calories:900-1500 (goal 1200)  Protein: ~85 gm    Meal Plan  Breakfast: 200, 27  Snack: 0-150, 0-10  Lunch: 220-500, 21-28  Snack 100-150, 5-10  Dinner: 300-500, 21-28  Snack: 100-150, 5-10    Nutrition Education:    Healthy Core Manual  Calorie controlled menu  Lean protein food choices  Healthy snack options  Food journaling tips  Dining out    Nutrition Counseling:  Strategies: meal planning, portion sizes, healthy snack choices, hydration, fiber intake, protein intake, exercise, food journal      Monitoring and Evaluation:  Evaluation criteria:  Energy Intake  Meet protein needs  Maintain adequate hydration  Monitor weekly weight  Meal planning/preparation  Food journal   Decreased portions at mealtimes and snacks  Physical activity     Barriers to learning:none  Readiness to change: Action  Comprehension: very good  Expected Compliance: very good

## 2019-12-05 ENCOUNTER — CLINICAL SUPPORT (OUTPATIENT)
Dept: BARIATRICS | Facility: CLINIC | Age: 69
End: 2019-12-05

## 2019-12-05 VITALS — WEIGHT: 191.4 LBS | BODY MASS INDEX: 31.89 KG/M2 | HEIGHT: 65 IN

## 2019-12-05 DIAGNOSIS — R63.5 ABNORMAL WEIGHT GAIN: Primary | ICD-10-CM

## 2019-12-05 PROCEDURE — RECHECK

## 2019-12-19 ENCOUNTER — CLINICAL SUPPORT (OUTPATIENT)
Dept: BARIATRICS | Facility: CLINIC | Age: 69
End: 2019-12-19

## 2019-12-19 VITALS — HEIGHT: 65 IN | BODY MASS INDEX: 31.82 KG/M2 | WEIGHT: 191 LBS

## 2019-12-19 DIAGNOSIS — R63.5 ABNORMAL WEIGHT GAIN: Primary | ICD-10-CM

## 2019-12-19 PROCEDURE — RECHECK

## 2020-01-02 ENCOUNTER — OFFICE VISIT (OUTPATIENT)
Dept: BARIATRICS | Facility: CLINIC | Age: 70
End: 2020-01-02

## 2020-01-02 VITALS — WEIGHT: 191.7 LBS | BODY MASS INDEX: 31.94 KG/M2 | HEIGHT: 65 IN

## 2020-01-02 DIAGNOSIS — R63.5 ABNORMAL WEIGHT GAIN: Primary | ICD-10-CM

## 2020-01-02 PROCEDURE — RECHECK

## 2020-01-02 NOTE — PROGRESS NOTES
Weight Management Medical Nutrition Assessment  Is here for f/u RD visit for [facility]  Current wt:  191 7 lbs  Loss of   12 9 lbs x 4 1/2 months  Continues to use the lose it zoë for tracking and consuming ~1300 calories/day  Did notice over the holidays she had increased to 9687-9611 calories   She has been walking somewhat less, 2x/wk but states she is joining the [employer] today so that she can use the pool  Anthropometric Measurements  Start Weight (lbs): 204 6 lbs  Current wt:  191 7 lbs  %TBW loss: 6 3%   Ideal Body Weight (lbs): 125 lbs  Goal Weight (lbs): 160 lbs  Highest: 208 lbs  Lowest:  135 lbs  UBW: 160 lbs    Weight Loss History  Previous weight loss attempts: Commercial Programs ([employer], [employer], etc )  Exercise  Self Created Diets (Portion Control, Healthy Food Choices, etc )    Food and Nutrition Related History  Wake up: 7-8   Bed Time: 10-11    Food Recall  Breakfast: 7:30-8:30: bar   Snack: skip  Lunch: 12:00: blue apron (350) OR 2 oz pork, broccoli, pasta w/sauce  OR Healthy choice power bowl  Snack: skip   Dinner: 5-6 p m  Salad w/1 slice dumont, fruit, italian dressing cut with water OR 4 oz salmon/chicken,  veggies or peas, baked potato or pasta OR if heavier meal at lunch will have egg s/w or turkey, cheddar, apple s/w  Snack:  Skip     Beverages: water, sugar free beverages and coffee/tea w/low fat half & half, seltzer   Volume of beverage intake: 90 oz    Weekends: Same  Cravings: sweets  Trouble area of day:n/a    Frequency of Eating out: 4-5x/wk  Food restrictions: n/a  Cooking:   Food Shopping: both    Physical Activity Intake  Activity: walking 30 minutes  Frequency: 2x/wk  Physical limitations/barriers to exercise: n/a    Estimated Needs  Energy  [place] BMR 1529 x1 3-2615=670  REE 1483, wt loss w/o exercise 9360-2484, with exercise 7429-4711  [employer] [place] [employer] Needs:  BMR 1404 : 1   1-1 5# loss weekly sedentary:    204-3728          1-2# loss weekly lightly active:  770-6300  Protein:68-85 gm      (1 2-1 5g/kg IBW)  Fluid: 66 oz     (35mL/kg IBW)    Nutrition Diagnosis  Yes; Overweight/obesity  related to Excess energy intake as evidenced by  BMI more than normative standard for age and sex (obesity-grade I 26-30  9)       Nutrition Intervention    Nutrition Prescription  Calories:900-1500 (goal 1200)  Protein: ~85 gm    Meal Plan  Breakfast: 200, 27  Snack: 0-150, 0-10  Lunch: 220-500, 21-28  Snack 100-150, 5-10  Dinner: 300-500, 21-28  Snack: 100-150, 5-10    Nutrition Education:    Healthy Core Manual  Calorie controlled menu  Lean protein food choices  Healthy snack options  Food journaling tips  Dining out    Nutrition Counseling:  Strategies: meal planning, portion sizes, healthy snack choices, hydration, fiber intake, protein intake, exercise, food journal      Monitoring and Evaluation:  Evaluation criteria:  Energy Intake  Meet protein needs  Maintain adequate hydration  Monitor weekly weight  Meal planning/preparation  Food journal   Decreased portions at mealtimes and snacks  Physical activity     Barriers to learning:none  Readiness to change: Action  Comprehension: very good  Expected Compliance: very good

## 2020-01-09 ENCOUNTER — CLINICAL SUPPORT (OUTPATIENT)
Dept: BARIATRICS | Facility: CLINIC | Age: 70
End: 2020-01-09

## 2020-01-09 VITALS — BODY MASS INDEX: 32.26 KG/M2 | HEIGHT: 65 IN | WEIGHT: 193.6 LBS

## 2020-01-09 DIAGNOSIS — R63.5 ABNORMAL WEIGHT GAIN: Primary | ICD-10-CM

## 2020-01-09 PROCEDURE — RECHECK

## 2020-01-23 ENCOUNTER — CLINICAL SUPPORT (OUTPATIENT)
Dept: BARIATRICS | Facility: CLINIC | Age: 70
End: 2020-01-23

## 2020-01-23 VITALS — WEIGHT: 193.2 LBS | BODY MASS INDEX: 32.19 KG/M2 | HEIGHT: 65 IN

## 2020-01-23 DIAGNOSIS — R63.5 ABNORMAL WEIGHT GAIN: Primary | ICD-10-CM

## 2020-01-23 PROCEDURE — RECHECK

## 2020-02-07 NOTE — PROGRESS NOTES
Weight Management Medical Nutrition Assessment  Is here for f/u RD visit for [facility] Ways  Current wt: 191 6 lbs  Loss of 13# lbs x 6 months  Continues to use the lose it zoë for tracking and consuming ~1433-5594 calories/day (goal is 1200 kcal)  She reports that she knows she is eating too much  She just joined the [employer] (goes 2x/wk) and walks 3x/wk  She understands that she has been going out to eat too often and not making great choices  Reviewed healthier dinning out options  She continues to use Blue apron as meals throughout the week       Anthropometric Measurements  Start Weight (lbs): 204 6 lbs  Current wt:  191 6 lbs  %TBW loss: 6 3%   Ideal Body Weight (lbs): 125 lbs  Goal Weight (lbs): 160 lbs   Highest: 208 lbs  Lowest:  135 lbs  UBW: 160 lbs     Weight Loss History  Previous weight loss attempts: Commercial Programs ([employer], [employer], etc )  Exercise  Self Created Diets (Portion Control, Healthy Food Choices, etc )     Food and Nutrition Related History  Wake up: 7-8     Bed Time: 10-11  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     Food Recall  Breakfast: 7:30-8:30: protein bar and coffee  Snack: skip  Lunch: 12:00: hamburger; blue apron meal, 350 kcal (gets 8 meals/wk)  Snack: skip   Dinner: 5-6 p m  Blue apron (350 kcal) or goes out (2 slices pizza; hamburger)  Snack:  cupcake (180 kcals)     Beverages: water, sugar free beverages and coffee/tea w/low fat half & half, seltzer   Volume of beverage intake: 90 oz     Weekends: Same  Cravings: sweets  Trouble area of day: lunch (goes out a lot)     Frequency of Eating out: almost daily (pizza place)  Food restrictions: n/a  Cooking:   Food Shopping: both     Physical Activity Intake  Activity: walking 30 minutes  Frequency: 2x/wk  Physical limitations/barriers to exercise: n/a     Estimated Needs  Energy  [place] BMR 1529 x1 3-4784=011  REE 1483, wt loss w/o exercise 2441-2504, with exercise 4510-6186  [employer] [place] [employer] Needs:  BMR 0861    1-1 5# loss weekly sedentary:    934-1184            1-2# loss weekly lightly active:  930-1430  Protein:68-85 gm      (1 2-1 5g/kg IBW)  Fluid: 66 oz     (35mL/kg IBW)     Nutrition Diagnosis  Yes; Overweight/obesity  related to Excess energy intake as evidenced by  BMI more than normative standard for age and sex (obesity-grade I 26-30  9)     Nutrition Intervention  Nutrition Prescription  Calories:900-1500 (goal 1200)  Protein: ~85 gm     Meal Plan  Breakfast: 200, 27  Snack: 0-150, 0-10  Lunch: 220-500, 21-28  Snack 100-150, 5-10  Dinner: 300-500, 21-28  Snack: 100-150, 5-10     Nutrition Education:    Healthy Core Manual  Calorie controlled menu  Lean protein food choices  Healthy snack options  Food journaling tips  Dining out     Nutrition Counseling:  Strategies: meal planning, portion sizes, healthy snack choices, hydration, fiber intake, protein intake, exercise, food journal        Monitoring and Evaluation:  Evaluation criteria:  Energy Intake  Meet protein needs  Maintain adequate hydration  Monitor weekly weight  Meal planning/preparation  Food journal   Decreased portions at mealtimes and snacks  Physical activity      Barriers to learning:none  Readiness to change: Action  Comprehension: very good  Expected Compliance: very good

## 2020-02-12 ENCOUNTER — OFFICE VISIT (OUTPATIENT)
Dept: BARIATRICS | Facility: CLINIC | Age: 70
End: 2020-02-12

## 2020-02-12 VITALS — WEIGHT: 191.6 LBS | HEIGHT: 65 IN | BODY MASS INDEX: 31.92 KG/M2

## 2020-02-12 DIAGNOSIS — R63.5 ABNORMAL WEIGHT GAIN: Primary | ICD-10-CM

## 2020-02-12 PROCEDURE — RECHECK

## 2020-02-20 ENCOUNTER — CLINICAL SUPPORT (OUTPATIENT)
Dept: BARIATRICS | Facility: CLINIC | Age: 70
End: 2020-02-20

## 2020-02-20 VITALS — HEIGHT: 65 IN | WEIGHT: 191.2 LBS | BODY MASS INDEX: 31.86 KG/M2

## 2020-02-20 DIAGNOSIS — R63.5 ABNORMAL WEIGHT GAIN: Primary | ICD-10-CM

## 2020-02-20 PROCEDURE — RECHECK

## 2020-03-09 ENCOUNTER — HOSPITAL ENCOUNTER (OUTPATIENT)
Dept: MAMMOGRAPHY | Facility: CLINIC | Age: 70
Discharge: HOME | End: 2020-03-09
Payer: MEDICARE

## 2020-03-09 VITALS — HEIGHT: 65 IN | WEIGHT: 191 LBS | BODY MASS INDEX: 31.82 KG/M2

## 2020-03-09 DIAGNOSIS — Z12.39 BREAST SCREENING: ICD-10-CM

## 2020-03-09 PROCEDURE — 77063 BREAST TOMOSYNTHESIS BI: CPT

## 2020-03-09 PROCEDURE — 77067 SCR MAMMO BI INCL CAD: CPT

## 2020-05-08 ENCOUNTER — TELEMEDICINE (OUTPATIENT)
Dept: SLEEP CENTER | Facility: CLINIC | Age: 70
End: 2020-05-08
Payer: MEDICARE

## 2020-05-08 DIAGNOSIS — G47.33 OSA (OBSTRUCTIVE SLEEP APNEA): Primary | ICD-10-CM

## 2020-05-08 PROCEDURE — 99213 OFFICE O/P EST LOW 20 MIN: CPT | Performed by: INTERNAL MEDICINE

## 2020-05-11 ENCOUNTER — TELEPHONE (OUTPATIENT)
Dept: SLEEP CENTER | Facility: CLINIC | Age: 70
End: 2020-05-11

## 2020-09-10 ENCOUNTER — TRANSCRIBE ORDERS (OUTPATIENT)
Dept: LAB | Facility: CLINIC | Age: 70
End: 2020-09-10

## 2020-09-10 ENCOUNTER — APPOINTMENT (OUTPATIENT)
Dept: LAB | Facility: CLINIC | Age: 70
End: 2020-09-10
Payer: MEDICARE

## 2020-09-10 DIAGNOSIS — I10 ESSENTIAL HYPERTENSION, MALIGNANT: Primary | ICD-10-CM

## 2020-09-10 DIAGNOSIS — I10 ESSENTIAL HYPERTENSION, MALIGNANT: ICD-10-CM

## 2020-09-10 DIAGNOSIS — R73.9 BLOOD GLUCOSE ELEVATED: ICD-10-CM

## 2020-09-10 LAB
ALBUMIN SERPL BCP-MCNC: 3.6 G/DL (ref 3.5–5)
ALP SERPL-CCNC: 147 U/L (ref 46–116)
ALT SERPL W P-5'-P-CCNC: 29 U/L (ref 12–78)
ANION GAP SERPL CALCULATED.3IONS-SCNC: 5 MMOL/L (ref 4–13)
AST SERPL W P-5'-P-CCNC: 16 U/L (ref 5–45)
BASOPHILS # BLD AUTO: 0.05 THOUSANDS/ΜL (ref 0–0.1)
BASOPHILS NFR BLD AUTO: 1 % (ref 0–1)
BILIRUB SERPL-MCNC: 0.46 MG/DL (ref 0.2–1)
BUN SERPL-MCNC: 14 MG/DL (ref 5–25)
CALCIUM SERPL-MCNC: 8.5 MG/DL (ref 8.3–10.1)
CHLORIDE SERPL-SCNC: 112 MMOL/L (ref 100–108)
CHOLEST SERPL-MCNC: 146 MG/DL (ref 50–200)
CO2 SERPL-SCNC: 26 MMOL/L (ref 21–32)
CREAT SERPL-MCNC: 1.18 MG/DL (ref 0.6–1.3)
EOSINOPHIL # BLD AUTO: 0.38 THOUSAND/ΜL (ref 0–0.61)
EOSINOPHIL NFR BLD AUTO: 4 % (ref 0–6)
ERYTHROCYTE [DISTWIDTH] IN BLOOD BY AUTOMATED COUNT: 13.8 % (ref 11.6–15.1)
EST. AVERAGE GLUCOSE BLD GHB EST-MCNC: 117 MG/DL
GFR SERPL CREATININE-BSD FRML MDRD: 47 ML/MIN/1.73SQ M
GLUCOSE P FAST SERPL-MCNC: 111 MG/DL (ref 65–99)
HBA1C MFR BLD: 5.7 %
HCT VFR BLD AUTO: 45.7 % (ref 34.8–46.1)
HDLC SERPL-MCNC: 58 MG/DL
HGB BLD-MCNC: 14 G/DL (ref 11.5–15.4)
IMM GRANULOCYTES # BLD AUTO: 0.01 THOUSAND/UL (ref 0–0.2)
IMM GRANULOCYTES NFR BLD AUTO: 0 % (ref 0–2)
LDLC SERPL CALC-MCNC: 61 MG/DL (ref 0–100)
LYMPHOCYTES # BLD AUTO: 3.36 THOUSANDS/ΜL (ref 0.6–4.47)
LYMPHOCYTES NFR BLD AUTO: 38 % (ref 14–44)
MCH RBC QN AUTO: 28.7 PG (ref 26.8–34.3)
MCHC RBC AUTO-ENTMCNC: 30.6 G/DL (ref 31.4–37.4)
MCV RBC AUTO: 94 FL (ref 82–98)
MONOCYTES # BLD AUTO: 0.6 THOUSAND/ΜL (ref 0.17–1.22)
MONOCYTES NFR BLD AUTO: 7 % (ref 4–12)
NEUTROPHILS # BLD AUTO: 4.42 THOUSANDS/ΜL (ref 1.85–7.62)
NEUTS SEG NFR BLD AUTO: 50 % (ref 43–75)
NONHDLC SERPL-MCNC: 88 MG/DL
NRBC BLD AUTO-RTO: 0 /100 WBCS
PLATELET # BLD AUTO: 313 THOUSANDS/UL (ref 149–390)
PMV BLD AUTO: 9.7 FL (ref 8.9–12.7)
POTASSIUM SERPL-SCNC: 4 MMOL/L (ref 3.5–5.3)
PROT SERPL-MCNC: 7 G/DL (ref 6.4–8.2)
RBC # BLD AUTO: 4.87 MILLION/UL (ref 3.81–5.12)
SODIUM SERPL-SCNC: 143 MMOL/L (ref 136–145)
TRIGL SERPL-MCNC: 135 MG/DL
TSH SERPL DL<=0.05 MIU/L-ACNC: 3.54 UIU/ML (ref 0.36–3.74)
WBC # BLD AUTO: 8.82 THOUSAND/UL (ref 4.31–10.16)

## 2020-09-10 PROCEDURE — 84443 ASSAY THYROID STIM HORMONE: CPT

## 2020-09-10 PROCEDURE — 36415 COLL VENOUS BLD VENIPUNCTURE: CPT

## 2020-09-10 PROCEDURE — 80061 LIPID PANEL: CPT

## 2020-09-10 PROCEDURE — 85025 COMPLETE CBC W/AUTO DIFF WBC: CPT

## 2020-09-10 PROCEDURE — 83036 HEMOGLOBIN GLYCOSYLATED A1C: CPT

## 2020-09-10 PROCEDURE — 80053 COMPREHEN METABOLIC PANEL: CPT

## 2021-03-10 DIAGNOSIS — Z23 ENCOUNTER FOR IMMUNIZATION: ICD-10-CM

## 2021-05-11 ENCOUNTER — OFFICE VISIT (OUTPATIENT)
Dept: SLEEP CENTER | Facility: CLINIC | Age: 71
End: 2021-05-11
Payer: MEDICARE

## 2021-05-11 VITALS
DIASTOLIC BLOOD PRESSURE: 80 MMHG | SYSTOLIC BLOOD PRESSURE: 120 MMHG | WEIGHT: 201.6 LBS | HEIGHT: 65 IN | BODY MASS INDEX: 33.59 KG/M2

## 2021-05-11 DIAGNOSIS — G47.31 COMPLEX SLEEP APNEA SYNDROME: Primary | ICD-10-CM

## 2021-05-11 PROCEDURE — 99213 OFFICE O/P EST LOW 20 MIN: CPT | Performed by: INTERNAL MEDICINE

## 2021-05-11 NOTE — PROGRESS NOTES
Progress Note - Sleep Center   Laure Tsang MWP: MRN: [id number]      Reason for Visit:  80 y  o female here for annual follow-up    Assessment:  Doing well on current therapy of ASV for complex sleep apnea  Plan:  Continue same    Follow up: One year    History of Present Illness:  History of ANA on PAP therapy  Fully compliant and deriving benefit  Review of Systems      Genitourinary none   Cardiology none   Gastrointestinal none   Neurology none   Constitutional none   Integumentary none   Psychiatry depression   Musculoskeletal none   Pulmonary shortness of breath with activity   ENT none   Endocrine none   Hematological none         I have reviewed and updated the review of systems as necessary      Historical Information    Past Medical History:   Past Medical History:   Diagnosis Date    Anxiety     Depression     GERD (gastroesophageal reflux disease)     Hepatitis B 1974    Sleep apnea          Past Surgical History: No past surgical history on file      Social History:   Social History     Socioeconomic History    Marital status: /Civil Union     Spouse name: None    Number of children: None    Years of education: None    Highest education level: None   Occupational History    None   Social Needs    Financial resource strain: None    Food insecurity     Worry: None     Inability: None    Transportation needs     Medical: None     Non-medical: None   Tobacco Use    Smoking status: Former Smoker     Quit date:      Years since quittin 3    Smokeless tobacco: Former User   Substance and Sexual Activity    Alcohol use: Yes     Frequency: Monthly or less     Comment: Occasional    Drug use: No    Sexual activity: None   Lifestyle    Physical activity     Days per week: None     Minutes per session: None    Stress: None   Relationships    Social connections     Talks on phone: None     Gets together: None     Attends [employer] service: None     Active member of club or organization: None     Attends meetings of clubs or organizations: None     Relationship status: None    Intimate partner violence     Fear of current or ex partner: None     Emotionally abused: None     Physically abused: None     Forced sexual activity: None   Other Topics Concern    None   Social History Narrative    None       Family History:   Family History   Problem Relation Age of Onset    Colon cancer Mother     Emphysema Father     Breast cancer Sister     No Known Problems Maternal Grandmother     No Known Problems Maternal Grandfather     No Known Problems Paternal Grandmother     No Known Problems Paternal Grandfather     No Known Problems Sister        Medications/Allergies:      Current Outpatient Medications:     ARIPiprazole (ABILIFY) 10 mg tablet, Take 10 mg by mouth daily, Disp: , Rfl: 0    aspirin 81 MG tablet, Take by mouth daily, Disp: , Rfl:     buPROPion (WELLBUTRIN XL) 150 mg 24 hr tablet, Take 150 mg by mouth 3 (three) times a day, Disp: , Rfl: 5    DULoxetine (CYMBALTA) 60 mg delayed release capsule, Take by mouth daily, Disp: , Rfl:     famotidine (PEPCID) 20 mg tablet, Take by mouth, Disp: , Rfl:     lamoTRIgine (LAMICTAL) 200 MG tablet, Take by mouth daily, Disp: , Rfl:     LIVALO 2 MG, Take 2 mg by mouth daily, Disp: , Rfl: 3    senna (SENOKOT) 8 6 mg, Take by mouth daily, Disp: , Rfl:           Objective      Vital Signs:   Vitals:    05/11/21 1144   BP: 120/80     Spring Sleepiness Scale: Total score: 3        Physical Exam:    General: Alert, appropriate, cooperative, overweight    Head: NC/AT    Skin: Warm, dry    Neuro: No motor abnormalities, cranial nerves appear intact    Extremity: No clubbing, cyanosis      DME Provider:   [facility]        Counseling / Coordination of Care   I have spent  15 minutes with the patient today in which greater than 50% of this time was spent in counseling/coordination of care regarding: equipment and compliance  Board Certified Sleep Specialist    Portions of the record may have been created with voice recognition software  Occasional wrong word or "sound a like" substitutions may have occurred due to the inherent limitations of voice recognition software  Read the chart carefully and recognize, using context, where substitutions have occurred

## 2021-07-14 ENCOUNTER — HOSPITAL ENCOUNTER (OUTPATIENT)
Dept: ULTRASOUND IMAGING | Facility: CLINIC | Age: 71
Discharge: HOME | End: 2021-07-14
Payer: MEDICARE

## 2021-07-14 ENCOUNTER — HOSPITAL ENCOUNTER (OUTPATIENT)
Dept: MAMMOGRAPHY | Facility: CLINIC | Age: 71
Discharge: HOME | End: 2021-07-14
Payer: MEDICARE

## 2021-07-14 DIAGNOSIS — N64.52 NIPPLE DISCHARGE: ICD-10-CM

## 2021-07-14 DIAGNOSIS — R92.8 ABNORMAL MAMMOGRAM: ICD-10-CM

## 2021-07-14 PROCEDURE — G0279 TOMOSYNTHESIS, MAMMO: HCPCS

## 2021-07-14 PROCEDURE — 76642 ULTRASOUND BREAST LIMITED: CPT

## 2021-07-14 PROCEDURE — 77066 DX MAMMO INCL CAD BI: CPT

## 2021-07-23 ENCOUNTER — HOSPITAL ENCOUNTER (OUTPATIENT)
Dept: MRI IMAGING | Facility: HOSPITAL | Age: 71
Discharge: HOME | End: 2021-07-23
Payer: MEDICARE

## 2021-07-23 VITALS — WEIGHT: 200 LBS | BODY MASS INDEX: 33.32 KG/M2 | HEIGHT: 65 IN

## 2021-07-23 DIAGNOSIS — N64.52 NIPPLE DISCHARGE: ICD-10-CM

## 2021-07-23 PROCEDURE — C8908 MRI W/O FOL W/CONT, BREAST,: HCPCS

## 2021-07-23 PROCEDURE — C8937 CAD BREAST MRI: HCPCS

## 2021-07-23 PROCEDURE — G1004 CDSM NDSC: HCPCS

## 2021-07-28 NOTE — PROGRESS NOTES
Spoke to patient today via telephone regarding Dr Kevin Garcia recommendation from MRI of 7/23/21 for;      __x___ RIGHT ______LEFT      _____Ultrasound guided  ______Stereotactic  Breast biopsy __x___MRI Guided Biopsy      __x___Verbalized understanding  Blood thinners:  ___x__yes _____no - ASA 81 mg    Date stopped: ____n/a_______    Biopsy teaching sheet reviewed with pt verbalizing understanding and questions and concerns were addressed at this time:  ____x___yes ______no       Med Hx: Hyperlipidemia; Sleep Apnea;  Anxiety; Depression; GERD

## 2021-08-05 ENCOUNTER — OCCMED (OUTPATIENT)
Dept: URGENT CARE | Facility: CLINIC | Age: 71
End: 2021-08-05

## 2021-08-05 DIAGNOSIS — Z02.1 PHYSICAL EXAM, PRE-EMPLOYMENT: Primary | ICD-10-CM

## 2021-08-05 PROCEDURE — 86480 TB TEST CELL IMMUN MEASURE: CPT | Performed by: PHYSICIAN ASSISTANT

## 2021-08-06 ENCOUNTER — HOSPITAL ENCOUNTER (OUTPATIENT)
Dept: RADIOLOGY | Facility: HOSPITAL | Age: 71
Discharge: HOME | End: 2021-08-06
Attending: INTERNAL MEDICINE
Payer: MEDICARE

## 2021-08-06 DIAGNOSIS — R92.8 ABNORMAL MAMMOGRAM OF RIGHT BREAST: ICD-10-CM

## 2021-08-06 PROCEDURE — A9585 GADOBUTROL INJECTION: HCPCS | Performed by: INTERNAL MEDICINE

## 2021-08-06 PROCEDURE — 88341 IMHCHEM/IMCYTCHM EA ADD ANTB: CPT | Performed by: PATHOLOGY

## 2021-08-06 PROCEDURE — 88305 TISSUE EXAM BY PATHOLOGIST: CPT | Performed by: PATHOLOGY

## 2021-08-06 PROCEDURE — 88342 IMHCHEM/IMCYTCHM 1ST ANTB: CPT | Performed by: PATHOLOGY

## 2021-08-06 PROCEDURE — A4648 IMPLANTABLE TISSUE MARKER: HCPCS

## 2021-08-06 PROCEDURE — 19085 BX BREAST 1ST LESION MR IMAG: CPT

## 2021-08-06 RX ORDER — LIDOCAINE HYDROCHLORIDE AND EPINEPHRINE BITARTRATE 20; .01 MG/ML; MG/ML
20 INJECTION, SOLUTION SUBCUTANEOUS ONCE
Status: COMPLETED | OUTPATIENT
Start: 2021-08-06 | End: 2021-08-06

## 2021-08-06 RX ORDER — LIDOCAINE HYDROCHLORIDE 10 MG/ML
5 INJECTION, SOLUTION EPIDURAL; INFILTRATION; INTRACAUDAL; PERINEURAL ONCE
Status: COMPLETED | OUTPATIENT
Start: 2021-08-06 | End: 2021-08-06

## 2021-08-06 RX ADMIN — LIDOCAINE HYDROCHLORIDE 5 ML: 10 INJECTION, SOLUTION EPIDURAL; INFILTRATION; INTRACAUDAL; PERINEURAL at 11:25

## 2021-08-06 RX ADMIN — LIDOCAINE HYDROCHLORIDE,EPINEPHRINE BITARTRATE 20 ML: 20; .01 INJECTION, SOLUTION INFILTRATION; PERINEURAL at 11:26

## 2021-08-06 RX ADMIN — GADOBUTROL 9 ML: 604.72 INJECTION INTRAVENOUS at 11:04

## 2021-08-06 NOTE — NURSING NOTE
Patient tolerated biopsy well, no complaints verbalized  After holding pressure to site, steri-strips applied and band aide  IV removed from right [facility] site by RT [facility]  Post procedure discharge instructions and ice packs given to patient with verbal communication of understanding  Patient left MRI suite ambulating with no complaints

## 2021-08-09 LAB
GAMMA INTERFERON BACKGROUND BLD IA-ACNC: 0.17 IU/ML
M TB IFN-G BLD-IMP: NEGATIVE
M TB IFN-G CD4+ BCKGRND COR BLD-ACNC: 0 IU/ML
M TB IFN-G CD4+ BCKGRND COR BLD-ACNC: 0.02 IU/ML
MITOGEN IGNF BCKGRD COR BLD-ACNC: >10 IU/ML

## 2021-08-09 NOTE — PROGRESS NOTES
Post procedure call completed    Bleeding: _____yes ___x__no    Pain: _____yes ___x___no - Pt reports "mild discomfort only  "  Using Motrin as needed    Redness/Swelling: ______yes __x____no    Band aid removed: __x___yes _____no    Steri-Strips intact: ___x___yes _____no

## 2021-08-10 ENCOUNTER — TELEPHONE (OUTPATIENT)
Dept: HEMATOLOGY ONCOLOGY | Facility: CLINIC | Age: 71
End: 2021-08-10

## 2021-08-10 ENCOUNTER — TELEPHONE (OUTPATIENT)
Dept: MAMMOGRAPHY | Facility: CLINIC | Age: 71
End: 2021-08-10

## 2021-08-10 NOTE — TELEPHONE ENCOUNTER
New Patient Breast Form   Patient Details:     Ladonna Ocasio     1950     [id number]     Background Information:   [address] starts by opening a telephone encounter and gathering the following information   Who is calling to schedule and relationship? ROBB Johnson   Referring Provider [facility]   To which speciality is the referral?  Surgical Oncology   Reason for Visit? New Diagnosis   Tumor Type? Intraductal papilloma   Is this Cancer or Non-Cancer? Non-cancer   Is there a confimed diagnosis from biopsy/tissue reviewed by Pathology? Yes   Date of Tissue Diagnosis (If done outside of [facility] please obtain report and slides) 8/6/21   Is patient aware of diagnosis? Who made them aware? yes   If no tissue diagnosis, please stop and discuss with Navigator prior to scheduling   When was the diagnosis made? 8/2021   Were outside slides requested? If not, why? No   If biopsy done externally, obtain reports and slides for internal review   Have you had any imaging or labs done? Where were they done? Yes   Was the patient told to bring a disk? If no, why not? No   Are records in EPIC? Yes   Is there a personal history of cancer? If yes, please list the type and year diagnosed No   If patient has a prior history of breast cancer were old records obtained? na   Is there a family history of cancer? If yes, please list type of cancer  Yes   Breast, colon   Have you ever had genetic testing done for breast cancer? If so, can you please bring a copy to appointment for timely treatment planning No   Scheduling Information:   Preferred [address]   Are you willing to see another provider in order to be seen sooner? Yes   Are there any days the patient cannot be seen? If yes, please list the dates No   How was New Patient Packet Sent? Email   Miscellaneous:   After completing the above information, please route to finance, nurse navigation and clinical trials for review

## 2021-08-12 ENCOUNTER — TELEPHONE (OUTPATIENT)
Dept: SURGICAL ONCOLOGY | Facility: CLINIC | Age: 71
End: 2021-08-12

## 2021-08-12 PROBLEM — D24.1 INTRADUCTAL PAPILLOMA OF BREAST, RIGHT: Status: ACTIVE | Noted: 2021-08-12

## 2021-08-13 ENCOUNTER — CONSULT (OUTPATIENT)
Dept: SURGICAL ONCOLOGY | Facility: CLINIC | Age: 71
End: 2021-08-13
Payer: MEDICARE

## 2021-08-13 VITALS
HEIGHT: 65 IN | SYSTOLIC BLOOD PRESSURE: 138 MMHG | RESPIRATION RATE: 12 BRPM | BODY MASS INDEX: 34.66 KG/M2 | TEMPERATURE: 99.3 F | HEART RATE: 84 BPM | DIASTOLIC BLOOD PRESSURE: 80 MMHG | WEIGHT: 208 LBS | OXYGEN SATURATION: 98 %

## 2021-08-13 DIAGNOSIS — D24.1 INTRADUCTAL PAPILLOMA OF BREAST, RIGHT: Primary | ICD-10-CM

## 2021-08-13 PROCEDURE — 99205 OFFICE O/P NEW HI 60 MIN: CPT | Performed by: SURGERY

## 2021-08-13 NOTE — PROGRESS NOTES
Surgical Oncology Consult Note       [address]  [facility]  [address] [facility]  [place] [address]    Li العراقيnd  1950  [id number]      No chief complaint on file  Assessment/Plan    1  Intraductal papilloma of breast, right         Oncology History    No history exists  This is a 50-year-old female with recent diagnosis of MRI guided biopsy of right breast intraductal papilloma  She is here to discuss further workup and management  She has mild clear discharge from right breast nipple  Denies of any breast pain, palpable mass or skin change        Review of Systems   Constitutional: Negative for chills and fever  HENT: Negative for ear pain and sore throat  Eyes: Negative for pain and visual disturbance  Respiratory: Negative for cough and shortness of breath  Cardiovascular: Negative for chest pain and palpitations  Gastrointestinal: Negative for abdominal pain and vomiting  Genitourinary: Negative for dysuria and hematuria  Musculoskeletal: Negative for arthralgias and back pain  Skin: Negative for color change and rash  Neurological: Negative for seizures and syncope  All other systems reviewed and are negative         Past Medical History:      Patient Active Problem List   Diagnosis    ANA (obstructive sleep apnea)    Complex sleep apnea syndrome    Obesity, Class I, BMI 30-34 9    Screening for diabetes mellitus    Screening for thyroid disorder    Abnormal weight gain    Intraductal papilloma of breast, right        Past Medical History:   Diagnosis Date    Anxiety     Breast cyst     Left    Depression     GERD (gastroesophageal reflux disease)     Hepatitis B 1974    Sleep apnea         Past Surgical History:   Procedure Laterality Date    MRI BREAST BIOPSY RIGHT (ALL INCLUSIVE) Right 8/6/2021        Family History   Problem Relation Age of Onset    Colon cancer Mother 55    Emphysema Father     Breast cancer Sister 72    No Known Problems Maternal Grandmother     No Known Problems Maternal Grandfather     No Known Problems Paternal Grandmother     No Known Problems Paternal Grandfather     No Known Problems Sister     No Known Problems Maternal Aunt     No Known Problems Maternal Aunt     No Known Problems Paternal Aunt     No Known Problems Paternal Aunt         Social History     Socioeconomic History    Marital status: /Civil Union     Spouse name: Not on file    Number of children: Not on file    Years of education: Not on file    Highest education level: Not on file   Occupational History    Not on file   Tobacco Use    Smoking status: Former Smoker     Quit date:      Years since quittin 6    Smokeless tobacco: Former User   Substance and Sexual Activity    Alcohol use: Yes     Comment: Occasional    Drug use: No    Sexual activity: Not on file   Other Topics Concern    Not on file   Social History Narrative    Not on file     Social Determinants of Health     Financial Resource Strain:     Difficulty of Paying Living Expenses:    Food Insecurity:     Worried About Running Out of Food in the Last Year:     [address] in the Last Year:    Transportation Needs:     Lack of Transportation (Medical):      Lack of Transportation (Non-Medical):    Physical Activity:     Days of Exercise per Week:     Minutes of Exercise per Session:    Stress:     Feeling of Stress :    Social Connections:     Frequency of Communication with Friends and Family:     Frequency of Social Gatherings with Friends and Family:     Attends Denominational Services:     Active Member of Clubs or Organizations:     Attends Club or Organization Meetings:     Marital Status:    Intimate Partner Violence:     Fear of Current or Ex-Partner:     Emotionally Abused:     Physically Abused:     Sexually Abused:         Current Outpatient Medications:    ARIPiprazole (ABILIFY) 10 mg tablet, Take 10 mg by mouth daily, Disp: , Rfl: 0    aspirin 81 MG tablet, Take by mouth daily, Disp: , Rfl:     buPROPion (WELLBUTRIN XL) 150 mg 24 hr tablet, Take 150 mg by mouth 3 (three) times a day, Disp: , Rfl: 5    DULoxetine (CYMBALTA) 60 mg delayed release capsule, Take by mouth daily, Disp: , Rfl:     famotidine (PEPCID) 20 mg tablet, Take by mouth, Disp: , Rfl:     lamoTRIgine (LAMICTAL) 200 MG tablet, Take by mouth daily, Disp: , Rfl:     LIVALO 2 MG, Take 2 mg by mouth daily, Disp: , Rfl: 3    senna (SENOKOT) 8 6 mg, Take by mouth daily, Disp: , Rfl:      Allergies   Allergen Reactions    Codeine     Sulfa Antibiotics        Physical Exam:     Vitals:    08/13/21 1355   BP: 138/80   Pulse: 84   Resp: 12   Temp: 99 3 °F (37 4 °C)   SpO2: 98%     Physical Exam  Constitutional:       Appearance: Normal appearance  HENT:      Head: Normocephalic and atraumatic  Nose: Nose normal       Mouth/Throat:      Mouth: Mucous membranes are moist    Eyes:      Pupils: Pupils are equal, round, and reactive to light  Cardiovascular:      Rate and Rhythm: Normal rate  Pulses: Normal pulses  Heart sounds: Normal heart sounds  Pulmonary:      Effort: Pulmonary effort is normal       Breath sounds: Normal breath sounds  Chest:      Comments: The bilateral Breast(s) were examined  There was not any sign of an inverted nipple, mass, nipple discharge, skin changes or tenderness  The bilateral  breast(s) were examined in the sitting and supine position  There are not any worrisome skin changes, tenderness, nipple changes, swelling ,bleeding or evidence of mass/s in all four quadrants  Meagan survey demonstrated that there is not any evidence of any clinically suspicious axillary, pectoral or supraclavicular lymph nodes  Abdominal:      General: Bowel sounds are normal       Palpations: Abdomen is soft  Musculoskeletal:         General: Normal range of motion  Cervical back: Normal range of motion and neck supple  Skin:     General: Skin is warm  Neurological:      General: No focal deficit present  Mental Status: She is alert and oriented to person, place, and time  Psychiatric:         Mood and Affect: Mood normal          Behavior: Behavior normal          Thought Content: Thought content normal          Judgment: Judgment normal          Results:   A  Right breast, 9:00, anterior deep, MRI-guided biopsy x 6:  - Intraductal papilloma with usual ductal hyperplasia  - Negative for atypia or carcinoma in this specimen        Discussion/Summary: This is a 73-year-old female with recent biopsy of right breast consistent with intraductal papilloma  We did discuss in detail the benign nature of papilloma we  We also discussed about breast cancer as well  She is interested in lumpectomy for papilloma which is  reasonable   She will think about surgical option and will come and see us within 1 month to consent  All patient as well as her 's questions were answered to their satisfaction  Advance Care Planning/Advance Directives: Crissy Thompson MD discussed the disease status, and treatment plans with Ladonna Ocasio today 08/13/21 and will follow-up with the patient

## 2021-08-31 ENCOUNTER — TELEPHONE (OUTPATIENT)
Dept: CARDIAC SURGERY | Facility: CLINIC | Age: 71
End: 2021-08-31

## 2021-08-31 NOTE — TELEPHONE ENCOUNTER
LM for patient to call us to move apt to afternoon or another day due to Dr Kandy Araiza going into OR

## 2021-09-07 ENCOUNTER — TELEPHONE (OUTPATIENT)
Dept: SURGICAL ONCOLOGY | Facility: CLINIC | Age: 71
End: 2021-09-07

## 2021-09-07 NOTE — TELEPHONE ENCOUNTER
Received an e-mail from patient with questions regarding surgery  Called patient to review her questions and concerns and left a voicemail requesting a return phone call with my direct contact number  Will await patients' return call

## 2021-09-08 ENCOUNTER — ANCILLARY ORDERS (OUTPATIENT)
Dept: SURGICAL ONCOLOGY | Facility: CLINIC | Age: 71
End: 2021-09-08

## 2021-09-08 ENCOUNTER — OFFICE VISIT (OUTPATIENT)
Dept: SURGICAL ONCOLOGY | Facility: CLINIC | Age: 71
End: 2021-09-08
Payer: MEDICARE

## 2021-09-08 VITALS
WEIGHT: 206.8 LBS | SYSTOLIC BLOOD PRESSURE: 126 MMHG | OXYGEN SATURATION: 97 % | RESPIRATION RATE: 16 BRPM | HEIGHT: 65 IN | HEART RATE: 93 BPM | DIASTOLIC BLOOD PRESSURE: 80 MMHG | BODY MASS INDEX: 34.45 KG/M2 | TEMPERATURE: 97.5 F

## 2021-09-08 DIAGNOSIS — R92.8 ABNORMAL MAMMOGRAM: ICD-10-CM

## 2021-09-08 DIAGNOSIS — D24.1 INTRADUCTAL PAPILLOMA OF BREAST, RIGHT: ICD-10-CM

## 2021-09-08 DIAGNOSIS — Z01.818 PREOP EXAMINATION: Primary | ICD-10-CM

## 2021-09-08 PROCEDURE — 99215 OFFICE O/P EST HI 40 MIN: CPT | Performed by: SURGERY

## 2021-09-08 RX ORDER — DOCUSATE SODIUM 100 MG/1
CAPSULE, LIQUID FILLED ORAL
COMMUNITY
Start: 2020-01-12

## 2021-09-08 RX ORDER — LORAZEPAM 1 MG/1
1 TABLET ORAL 3 TIMES DAILY PRN
COMMUNITY
Start: 2021-08-23

## 2021-09-08 RX ORDER — MELOXICAM 7.5 MG/1
7.5 TABLET ORAL DAILY
COMMUNITY
Start: 2021-08-09

## 2021-09-08 RX ORDER — OXYCODONE HYDROCHLORIDE AND ACETAMINOPHEN 5; 325 MG/1; MG/1
1 TABLET ORAL EVERY 6 HOURS PRN
Qty: 20 TABLET | Refills: 0 | Status: CANCELLED | OUTPATIENT
Start: 2021-09-08

## 2021-09-08 RX ORDER — NALOXONE HYDROCHLORIDE 4 MG/.1ML
SPRAY NASAL
Qty: 1 EACH | Refills: 1 | Status: SHIPPED | OUTPATIENT
Start: 2021-09-08

## 2021-09-08 RX ORDER — ACETAMINOPHEN AND CODEINE PHOSPHATE 300; 30 MG/1; MG/1
1 TABLET ORAL EVERY 4 HOURS PRN
Qty: 30 TABLET | Refills: 0 | Status: SHIPPED | OUTPATIENT
Start: 2021-09-08

## 2021-09-08 NOTE — PROGRESS NOTES
Call placed to patient regarding recommendation for;    __X___ RIGHT ______LEFT      __X___SAVI  placement  Procedure explained to patient, additional questions answered at this time    __X___Verbalized understanding        Blood thinners:  _____yes __X___no- will stop ASA prior per Dr Nichols Amel    Date stopped: ___N/A____    All teaching points discussed during call, pt with no questions at this time, pt adv to arrive at 0800 for 0830 insertion    Pt given name/# for any further questions/needs

## 2021-09-08 NOTE — PROGRESS NOTES
Surgical Oncology Follow Up  [facility] [address] [facility] [place] [address]  Alejandrina Abbasi Race  1950  [id number]      Chief Complaint   Patient presents with    Follow-up     1 month         Assessment & Plan:   She is here to discuss surgery of right breast with a history of biopsy-proven right breast intraductal papilloma  Consent was obtained after explaining the benefit procedure alternatives possible complication  Plan for right breast HALEY directed lumpectomy  Cancer History:     Oncology History    No history exists  Interval History:   She is here who to discuss surgery  Review of Systems:   Review of Systems   Constitutional: Negative for chills and fever  HENT: Negative for ear pain and sore throat  Eyes: Negative for pain and visual disturbance  Respiratory: Negative for cough and shortness of breath  Cardiovascular: Negative for chest pain and palpitations  Gastrointestinal: Negative for abdominal pain and vomiting  Genitourinary: Negative for dysuria and hematuria  Musculoskeletal: Negative for arthralgias and back pain  Skin: Negative for color change and rash  Neurological: Negative for seizures and syncope  All other systems reviewed and are negative        Past Medical History     Patient Active Problem List   Diagnosis    ANA (obstructive sleep apnea)    Complex sleep apnea syndrome    Obesity, Class I, BMI 30-34 9    Screening for diabetes mellitus    Screening for thyroid disorder    Abnormal weight gain    Intraductal papilloma of breast, right     Past Medical History:   Diagnosis Date    Anxiety     Breast cyst     Left    Depression     GERD (gastroesophageal reflux disease)     Hepatitis B 1974    Sleep apnea      Past Surgical History:   Procedure Laterality Date    MRI BREAST BIOPSY RIGHT (ALL INCLUSIVE) Right 8/6/2021 Family History   Problem Relation Age of Onset    Colon cancer Mother 55    Emphysema Father     Breast cancer Sister 72    No Known Problems Maternal Grandmother     No Known Problems Maternal Grandfather     No Known Problems Paternal Grandmother     No Known Problems Paternal Grandfather     No Known Problems Sister     No Known Problems Maternal Aunt     No Known Problems Maternal Aunt     No Known Problems Paternal Aunt     No Known Problems Paternal Aunt      Social History     Socioeconomic History    Marital status: /Civil Union     Spouse name: Not on file    Number of children: Not on file    Years of education: Not on file    Highest education level: Not on file   Occupational History    Not on file   Tobacco Use    Smoking status: Former Smoker     Quit date:      Years since quittin 7    Smokeless tobacco: Former User   Vaping Use    Vaping Use: Never used   Substance and Sexual Activity    Alcohol use: Yes     Comment: Occasional    Drug use: No    Sexual activity: Not Currently   Other Topics Concern    Not on file   Social History Narrative    Not on file     Social Determinants of Health     Financial Resource Strain:     Difficulty of Paying Living Expenses:    Food Insecurity:     Worried About Running Out of Food in the Last Year:     [address] in the Last Year:    Transportation Needs:     Lack of Transportation (Medical):      Lack of Transportation (Non-Medical):    Physical Activity:     Days of Exercise per Week:     Minutes of Exercise per Session:    Stress:     Feeling of Stress :    Social Connections:     Frequency of Communication with Friends and Family:     Frequency of Social Gatherings with Friends and Family:     Attends Hindu Services:     Active Member of Clubs or Organizations:     Attends Club or Organization Meetings:     Marital Status:    Intimate Partner Violence:     Fear of Current or Ex-Partner:     Emotionally Abused:     Physically Abused:     Sexually Abused:        Current Outpatient Medications:     ARIPiprazole (ABILIFY) 10 mg tablet, Take 10 mg by mouth daily, Disp: , Rfl: 0    aspirin 81 MG tablet, Take by mouth daily, Disp: , Rfl:     buPROPion (WELLBUTRIN XL) 150 mg 24 hr tablet, Take 150 mg by mouth 3 (three) times a day, Disp: , Rfl: 5    docusate sodium (Colace) 100 mg capsule, , Disp: , Rfl:     DULoxetine (CYMBALTA) 60 mg delayed release capsule, Take by mouth daily, Disp: , Rfl:     famotidine (PEPCID) 20 mg tablet, Take by mouth, Disp: , Rfl:     lamoTRIgine (LAMICTAL) 200 MG tablet, Take by mouth daily, Disp: , Rfl:     LIVALO 2 MG, Take 2 mg by mouth daily, Disp: , Rfl: 3    LORazepam (ATIVAN) 1 mg tablet, Take 1 mg by mouth 3 (three) times a day as needed, Disp: , Rfl:     meloxicam (MOBIC) 7 5 mg tablet, Take 7 5 mg by mouth daily, Disp: , Rfl:     senna (SENOKOT) 8 6 mg, Take by mouth daily, Disp: , Rfl:   Allergies   Allergen Reactions    Codeine     Sulfa Antibiotics        Physical Exam:     Vitals:    09/08/21 1413   BP: 126/80   Pulse: 93   Resp: 16   Temp: 97 5 °F (36 4 °C)   SpO2: 97%     Physical Exam  Constitutional:       Appearance: Normal appearance  HENT:      Head: Normocephalic and atraumatic  Nose: Nose normal       Mouth/Throat:      Mouth: Mucous membranes are moist    Eyes:      Pupils: Pupils are equal, round, and reactive to light  Cardiovascular:      Rate and Rhythm: Normal rate  Pulses: Normal pulses  Heart sounds: Normal heart sounds  Pulmonary:      Effort: Pulmonary effort is normal       Breath sounds: Normal breath sounds  Chest:          Comments: The bilateral Breast(s) were examined  There was not any sign of an inverted nipple, mass, nipple discharge, skin changes or tenderness  The bilateral  breast(s) were examined in the sitting and supine position   There are not any worrisome skin changes, tenderness, nipple changes, swelling ,bleeding or evidence of mass/s in all four quadrants  Meagan survey demonstrated that there is not any evidence of any clinically suspicious axillary, pectoral or supraclavicular lymph nodes  Abdominal:      General: Bowel sounds are normal       Palpations: Abdomen is soft  Musculoskeletal:         General: Normal range of motion  Cervical back: Normal range of motion and neck supple  Skin:     General: Skin is warm  Neurological:      General: No focal deficit present  Mental Status: She is alert and oriented to person, place, and time  Psychiatric:         Mood and Affect: Mood normal          Behavior: Behavior normal          Thought Content: Thought content normal          Judgment: Judgment normal            Results & Discussion:   I we did discuss in detail about intraductal papilloma as well as benign breast disorders  Surgical consent was obtained after explaining benefits, alternative, procedure and possible complications with regards above mentioned procedure  All her questions regarding the visit  as well as the  surgery were answered to  the patient's satisfaction  she understands and  agrees   All patient questions were answered  Advance Care Planning/Advance Directives: Codi Harris MD discussed the disease status with Dimple Gold  today 09/08/21  treatment plans and follow-up with the patient

## 2021-09-08 NOTE — PATIENT INSTRUCTIONS
Breast Lumpectomy   AMBULATORY CARE:   What you need to know about a lumpectomy:  A lumpectomy is surgery to remove a mass in your breast  Breast tissue that surrounds the mass may also be taken  A lumpectomy is also known as breast-conserving surgery, a partial mastectomy, or a segmental mastectomy  How to prepare for a lumpectomy:   · You may need a mammogram or ultrasound before surgery  These tests may be done the same day as your surgery or at an earlier time  Your healthcare provider may use pictures from these tests to allegra the location of the mass  The marker will show him or her where to make your incision  · Your healthcare provider will talk to you about how to prepare for surgery  He or she may tell you not to eat or drink anything after midnight on the day of your surgery  He or she will tell you what medicines to take or not take on the day of your surgery  You may need to stop taking blood thinners or aspirin several days before your surgery  Arrange for someone to drive you home and stay with you for 24 hours after surgery  This person can help care for you, and monitor for any problems  What will happen during a lumpectomy:  You will be given general anesthesia to keep you asleep and free from pain during surgery  You may be given an antibiotic through your IV to help prevent a bacterial infection  Your healthcare provider will make an incision in your breast and remove the mass  He or she may also remove breast tissue or lymph nodes that are close to the mass  A drain may be inserted near your incision to remove extra fluid  This will decrease swelling and help your incision heal  Your healthcare provider will close your incision with stitches or strips of medical tape and cover it with a bandage  He or she may also wrap a tight-fitting bandage around both of your breasts  This may decrease swelling, bleeding, and pain    What will happen after a lumpectomy:  Healthcare providers will monitor you until you are awake  You may able to go home when you are awake and your pain is controlled  Instead you may need to spend the night in the hospital   Risks of a lumpectomy:  You may bleed more than expected or get an infection  Nerves, blood vessels, and muscles may be damaged during your surgery  You may have swelling in your arm closest to the lumpectomy or where lymph nodes were removed  This swelling is called lymphedema  Lymphedema may cause tingling, numbness, stiffness, and weakness in your arm  This may be permanent  You may get a blood clot in your arm or leg  The blood clot may travel to your heart lungs, or brain  This may become life-threatening  Call 911 for any of the following:   · You feel lightheaded, short of breath, and have chest pain  · You cough up blood  · You have trouble breathing  Seek care immediately if:   · Blood soaks through your bandage  · Your stitches come apart  · Your bruise suddenly gets bigger  · Your leg or arm is larger than normal and painful  Contact your healthcare provider if:   · You have a fever or chills  · Your wound is red, swollen, or draining pus  · You have nausea or are vomiting  · Your skin is itchy, swollen, or you have a rash  · Your pain does not get better after you take pain medicine  · Your drain falls out or stops draining fluid  · Your drain has pus or foul-smelling fluid coming out of it  · You have questions or concerns about your condition or care  Medicines: You may need any of the following:  · Antibiotics  help prevent a bacterial infection  · Prescription pain medicine  may be given  Ask your healthcare provider how to take this medicine safely  Some prescription pain medicines contain acetaminophen  Do not take other medicines that contain acetaminophen without talking to your healthcare provider  Too much acetaminophen may cause liver damage   Prescription pain medicine may cause constipation  Ask your healthcare provider how to prevent or treat constipation  · NSAIDs , such as ibuprofen, help decrease swelling, pain, and fever  NSAIDs can cause stomach bleeding or kidney problems in certain people  If you take blood thinner medicine, always ask your healthcare provider if NSAIDs are safe for you  Always read the medicine label and follow directions  · Take your medicine as directed  Contact your healthcare provider if you think your medicine is not helping or if you have side effects  Tell him or her if you are allergic to any medicine  Keep a list of the medicines, vitamins, and herbs you take  Include the amounts, and when and why you take them  Bring the list or the pill bottles to follow-up visits  Carry your medicine list with you in case of an emergency  Care for your wound as directed: If you have a tight-fitting bandage, you can remove it in 24 to 48 hours, or as directed  Ask your healthcare provider when your incision can get wet  You may need to take a sponge bath until your drain is removed  Carefully wash around the incision with soap and water  It is okay to allow the soap and water to gently run over your incision  Gently pat dry the area and put on new, clean bandages as directed  Change your bandages when they get wet or dirty  Check your incision every day for redness, pus, or swelling  Self-care:   · Apply ice  on your breast for 15 to 20 minutes every hour or as directed  Use an ice pack, or put crushed ice in a plastic bag  Cover it with a towel  Ice helps prevent tissue damage and decreases swelling and pain  · Rest  as directed  Do not lift anything heavy  Do not push or pull with your arms  Take short walks around the house  Gradually walk further as you feel better  Ask your healthcare provider when you can return to your normal activities  · Empty your drain  as directed  You may need to write down how much you empty from your drain each day   Ask your healthcare provider for more information about how to empty your drain  · Wear a supportive bra  as directed  Wait until you remove the tight-fitting bandage to wear a bra  You may be given a surgical bra or told to wear a sports bra  A supportive bra may help hold your bandages in place  It may also help with swelling and pain  Do not  wear bras with lace or underwire  They may rub against your incision and cause discomfort  Arm stretches: Your healthcare provider may show you how to do arm stretches  Arm stretches may prevent stiff arms or shoulders  You may need to wait until after your drains are removed to begin stretching  Do not do arm stretches until your healthcare provider says it is okay  Ask your healthcare provider for more information about arm stretches  Follow up with your healthcare provider as directed:  Write down your questions so you remember to ask them during your visits  © Copyright [employer] 2021 Information is for End User's use only and may not be sold, redistributed or otherwise used for commercial purposes  All illustrations and images included in CareNotes® are the copyrighted property of [employer]  or [employer]   The above information is an  only  It is not intended as medical advice for individual conditions or treatments  Talk to your doctor, nurse or pharmacist before following any medical regimen to see if it is safe and effective for you

## 2021-09-13 ENCOUNTER — HOSPITAL ENCOUNTER (OUTPATIENT)
Dept: MAMMOGRAPHY | Facility: CLINIC | Age: 71
Discharge: HOME | End: 2021-09-13
Payer: MEDICARE

## 2021-09-13 ENCOUNTER — HOSPITAL ENCOUNTER (OUTPATIENT)
Dept: ULTRASOUND IMAGING | Facility: CLINIC | Age: 71
Discharge: HOME | End: 2021-09-13
Payer: MEDICARE

## 2021-09-13 ENCOUNTER — APPOINTMENT (OUTPATIENT)
Dept: RADIOLOGY | Facility: CLINIC | Age: 71
End: 2021-09-13
Payer: MEDICARE

## 2021-09-13 ENCOUNTER — LAB (OUTPATIENT)
Dept: LAB | Facility: CLINIC | Age: 71
End: 2021-09-13
Payer: MEDICARE

## 2021-09-13 ENCOUNTER — CLINICAL SUPPORT (OUTPATIENT)
Dept: URGENT CARE | Facility: CLINIC | Age: 71
End: 2021-09-13
Payer: MEDICARE

## 2021-09-13 VITALS — DIASTOLIC BLOOD PRESSURE: 88 MMHG | HEART RATE: 88 BPM | SYSTOLIC BLOOD PRESSURE: 148 MMHG

## 2021-09-13 DIAGNOSIS — R92.8 ABNORMAL MAMMOGRAM: ICD-10-CM

## 2021-09-13 DIAGNOSIS — D24.1 INTRADUCTAL PAPILLOMA OF BREAST, RIGHT: ICD-10-CM

## 2021-09-13 DIAGNOSIS — Z01.818 PRE-OPERATIVE CLEARANCE: Primary | ICD-10-CM

## 2021-09-13 DIAGNOSIS — Z01.818 PREOP EXAMINATION: ICD-10-CM

## 2021-09-13 LAB
ALBUMIN SERPL BCP-MCNC: 3.5 G/DL (ref 3.5–5)
ALP SERPL-CCNC: 188 U/L (ref 46–116)
ALT SERPL W P-5'-P-CCNC: 32 U/L (ref 12–78)
ANION GAP SERPL CALCULATED.3IONS-SCNC: 4 MMOL/L (ref 4–13)
AST SERPL W P-5'-P-CCNC: 25 U/L (ref 5–45)
BASOPHILS # BLD AUTO: 0.05 THOUSANDS/ΜL (ref 0–0.1)
BASOPHILS NFR BLD AUTO: 1 % (ref 0–1)
BILIRUB SERPL-MCNC: 0.48 MG/DL (ref 0.2–1)
BUN SERPL-MCNC: 13 MG/DL (ref 5–25)
CALCIUM SERPL-MCNC: 8.7 MG/DL (ref 8.3–10.1)
CHLORIDE SERPL-SCNC: 110 MMOL/L (ref 100–108)
CO2 SERPL-SCNC: 26 MMOL/L (ref 21–32)
CREAT SERPL-MCNC: 1.14 MG/DL (ref 0.6–1.3)
EOSINOPHIL # BLD AUTO: 0.43 THOUSAND/ΜL (ref 0–0.61)
EOSINOPHIL NFR BLD AUTO: 4 % (ref 0–6)
ERYTHROCYTE [DISTWIDTH] IN BLOOD BY AUTOMATED COUNT: 13.4 % (ref 11.6–15.1)
GFR SERPL CREATININE-BSD FRML MDRD: 49 ML/MIN/1.73SQ M
GLUCOSE P FAST SERPL-MCNC: 141 MG/DL (ref 65–99)
HCT VFR BLD AUTO: 46.3 % (ref 34.8–46.1)
HGB BLD-MCNC: 14.4 G/DL (ref 11.5–15.4)
IMM GRANULOCYTES # BLD AUTO: 0.06 THOUSAND/UL (ref 0–0.2)
IMM GRANULOCYTES NFR BLD AUTO: 1 % (ref 0–2)
LYMPHOCYTES # BLD AUTO: 4.12 THOUSANDS/ΜL (ref 0.6–4.47)
LYMPHOCYTES NFR BLD AUTO: 37 % (ref 14–44)
MCH RBC QN AUTO: 29 PG (ref 26.8–34.3)
MCHC RBC AUTO-ENTMCNC: 31.1 G/DL (ref 31.4–37.4)
MCV RBC AUTO: 93 FL (ref 82–98)
MONOCYTES # BLD AUTO: 0.7 THOUSAND/ΜL (ref 0.17–1.22)
MONOCYTES NFR BLD AUTO: 6 % (ref 4–12)
NEUTROPHILS # BLD AUTO: 5.72 THOUSANDS/ΜL (ref 1.85–7.62)
NEUTS SEG NFR BLD AUTO: 51 % (ref 43–75)
NRBC BLD AUTO-RTO: 0 /100 WBCS
PLATELET # BLD AUTO: 342 THOUSANDS/UL (ref 149–390)
PMV BLD AUTO: 9.7 FL (ref 8.9–12.7)
POTASSIUM SERPL-SCNC: 3.9 MMOL/L (ref 3.5–5.3)
PROT SERPL-MCNC: 7.5 G/DL (ref 6.4–8.2)
RBC # BLD AUTO: 4.96 MILLION/UL (ref 3.81–5.12)
SODIUM SERPL-SCNC: 140 MMOL/L (ref 136–145)
WBC # BLD AUTO: 11.08 THOUSAND/UL (ref 4.31–10.16)

## 2021-09-13 PROCEDURE — 85025 COMPLETE CBC W/AUTO DIFF WBC: CPT

## 2021-09-13 PROCEDURE — A4648 IMPLANTABLE TISSUE MARKER: HCPCS

## 2021-09-13 PROCEDURE — 93005 ELECTROCARDIOGRAM TRACING: CPT

## 2021-09-13 PROCEDURE — 71046 X-RAY EXAM CHEST 2 VIEWS: CPT

## 2021-09-13 PROCEDURE — 36415 COLL VENOUS BLD VENIPUNCTURE: CPT

## 2021-09-13 PROCEDURE — 80053 COMPREHEN METABOLIC PANEL: CPT

## 2021-09-13 PROCEDURE — 19285 PERQ DEV BREAST 1ST US IMAG: CPT

## 2021-09-13 RX ORDER — LIDOCAINE HYDROCHLORIDE 10 MG/ML
5 INJECTION, SOLUTION EPIDURAL; INFILTRATION; INTRACAUDAL; PERINEURAL ONCE
Status: COMPLETED | OUTPATIENT
Start: 2021-09-13 | End: 2021-09-13

## 2021-09-13 RX ADMIN — LIDOCAINE HYDROCHLORIDE 5 ML: 10 INJECTION, SOLUTION EPIDURAL; INFILTRATION; INTRACAUDAL; PERINEURAL at 08:49

## 2021-09-13 NOTE — DISCHARGE INSTR - OTHER ORDERS
POST LARGE CORE BREAST BIOPSY PATIENT INFORMATION      1  Place an ice pack inside your bra over the top of the dressing every hour for 20 minutes (20 minutes on, 60 minutes off)  Do this until bedtime  2  Do not shower or bathe until the following morning  3  You may bathe your breast carefully with the steri-strips in place  Be careful    Not to loosen them  The steri-strips will fall off in 3-5 days  4  You may have mild discomfort, and you may have some bruising where the   Needle entered the skin  This should clear within 5-7 days  5  If you need medicine for discomfort, take acetaminophen products such as   Tylenol  You may also take Advil or Motrin products  6  Do not participate in strenuous activities such as-tennis, aerobics, skiing,  Weight lifting, etc  for 24 hours  Refrain from swimming/soaking for 72 hours  7  Wearing a bra for sleeping may be more comfortable for the first 24-48 hours  8  Watch for continued bleeding, pain or fever over 101; please call with any questions or concerns  For procedures done at the hospitals  [facility] Davies "[address] call:  Chai Sanchez RN at [phone]  Maritza Rand RN at [phone]                    *After 4 PM call the Interventional Radiology Department                    [phone] and ask to speak with the nurse on call  For procedures done at the [address] [place] [address] call:         Wilber Frank RN at   *After 4 PM call the Interventional Radiology Department   [phone] and ask to speak with the nurse on call  For procedures done at [address] [place] [address] call: The Radiology Nurse at [phone]  *After 4 PM call your physician, or go to the Emergency Department  9          The final results of your biopsy are usually available within one week

## 2021-09-13 NOTE — PROGRESS NOTES
Ice pack given:    ___X__yes _____no    Discharge instructions signed by patient:    __X___yes _____no    Discharge instructions given to patient:    ___X__yes _____no    Discharged via:    __X___ambulatory    _____wheelchair    _____stretcher    Stable on discharge:    ____X_yes ____no

## 2021-09-13 NOTE — PROGRESS NOTES
Procedure type:    _____ultrasound guided natalie  reflector placement  _____stereotactic    Breast:    _____Left ____X_Right    Location: 9 o'clock retrareolar    Needle: N/A    # of passes: N/A    Clip: Natalie  reflector placement    Performed by: Dr Giorgio Chirinos held for 5 minutes by:  Baylee SommerPCA)    Steri Strips:    _____yes ___X__no    Band aid:    ___X__yes_____no    Tape and guaze:    _____yes __X___no    Tolerated procedure:    ___X__yes _____no

## 2021-09-15 LAB
ATRIAL RATE: 91 BPM
P AXIS: -3 DEGREES
PR INTERVAL: 144 MS
QRS AXIS: 16 DEGREES
QRSD INTERVAL: 72 MS
QT INTERVAL: 358 MS
QTC INTERVAL: 440 MS
T WAVE AXIS: 17 DEGREES
VENTRICULAR RATE: 91 BPM

## 2021-09-15 PROCEDURE — 93010 ELECTROCARDIOGRAM REPORT: CPT | Performed by: INTERNAL MEDICINE

## 2021-09-15 NOTE — PROGRESS NOTES
Post procedure call completed    Bleeding: _____yes __x___no    Pain: _____yes __x____no    Redness/Swelling: ______yes ___x___no    Band aid removed: __x___yes _____no    Steri-Strips intact: ______yes _____none applied    Abby agreed to call [facility] with any questions/concerns

## 2021-09-21 ENCOUNTER — APPOINTMENT (OUTPATIENT)
Dept: MAMMOGRAPHY | Facility: CLINIC | Age: 71
End: 2021-09-21
Payer: MEDICARE

## 2021-09-21 ENCOUNTER — ANESTHESIA (OUTPATIENT)
Dept: PERIOP | Facility: HOSPITAL | Age: 71
End: 2021-09-21
Payer: MEDICARE

## 2021-09-21 ENCOUNTER — ANESTHESIA EVENT (OUTPATIENT)
Dept: PERIOP | Facility: HOSPITAL | Age: 71
End: 2021-09-21
Payer: MEDICARE

## 2021-09-21 ENCOUNTER — HOSPITAL ENCOUNTER (OUTPATIENT)
Facility: HOSPITAL | Age: 71
Setting detail: OUTPATIENT SURGERY
Discharge: HOME | End: 2021-09-21
Attending: SURGERY | Admitting: SURGERY
Payer: MEDICARE

## 2021-09-21 VITALS
OXYGEN SATURATION: 93 % | HEART RATE: 88 BPM | SYSTOLIC BLOOD PRESSURE: 141 MMHG | RESPIRATION RATE: 18 BRPM | BODY MASS INDEX: 34.05 KG/M2 | WEIGHT: 204.37 LBS | TEMPERATURE: 98.2 F | DIASTOLIC BLOOD PRESSURE: 77 MMHG | HEIGHT: 65 IN

## 2021-09-21 DIAGNOSIS — D24.1 INTRADUCTAL PAPILLOMA OF BREAST, RIGHT: ICD-10-CM

## 2021-09-21 DIAGNOSIS — N63.0 BREAST LUMP OR MASS: ICD-10-CM

## 2021-09-21 LAB — GLUCOSE SERPL-MCNC: 84 MG/DL (ref 65–140)

## 2021-09-21 PROCEDURE — 82948 REAGENT STRIP/BLOOD GLUCOSE: CPT

## 2021-09-21 PROCEDURE — 88307 TISSUE EXAM BY PATHOLOGIST: CPT | Performed by: PATHOLOGY

## 2021-09-21 PROCEDURE — 88342 IMHCHEM/IMCYTCHM 1ST ANTB: CPT | Performed by: PATHOLOGY

## 2021-09-21 PROCEDURE — NC001 PR NO CHARGE: Performed by: PHYSICIAN ASSISTANT

## 2021-09-21 PROCEDURE — G9198 NO ORDER FOR CEPH NO REASON: HCPCS | Performed by: SURGERY

## 2021-09-21 PROCEDURE — 19301 PARTIAL MASTECTOMY: CPT | Performed by: PHYSICIAN ASSISTANT

## 2021-09-21 PROCEDURE — 19301 PARTIAL MASTECTOMY: CPT | Performed by: SURGERY

## 2021-09-21 RX ORDER — ONDANSETRON 2 MG/ML
4 INJECTION INTRAMUSCULAR; INTRAVENOUS ONCE
Status: DISCONTINUED | OUTPATIENT
Start: 2021-09-21 | End: 2021-09-21 | Stop reason: HOSPADM

## 2021-09-21 RX ORDER — FENTANYL CITRATE/PF 50 MCG/ML
50 SYRINGE (ML) INJECTION
Status: DISCONTINUED | OUTPATIENT
Start: 2021-09-21 | End: 2021-09-21 | Stop reason: HOSPADM

## 2021-09-21 RX ORDER — ONDANSETRON 2 MG/ML
INJECTION INTRAMUSCULAR; INTRAVENOUS AS NEEDED
Status: DISCONTINUED | OUTPATIENT
Start: 2021-09-21 | End: 2021-09-21

## 2021-09-21 RX ORDER — CEFAZOLIN SODIUM 2 G/50ML
2000 SOLUTION INTRAVENOUS ONCE
Status: COMPLETED | OUTPATIENT
Start: 2021-09-21 | End: 2021-09-21

## 2021-09-21 RX ORDER — SODIUM CHLORIDE, SODIUM LACTATE, POTASSIUM CHLORIDE, CALCIUM CHLORIDE 600; 310; 30; 20 MG/100ML; MG/100ML; MG/100ML; MG/100ML
INJECTION, SOLUTION INTRAVENOUS CONTINUOUS PRN
Status: DISCONTINUED | OUTPATIENT
Start: 2021-09-21 | End: 2021-09-21

## 2021-09-21 RX ORDER — PROPOFOL 10 MG/ML
INJECTION, EMULSION INTRAVENOUS AS NEEDED
Status: DISCONTINUED | OUTPATIENT
Start: 2021-09-21 | End: 2021-09-21

## 2021-09-21 RX ORDER — HYDROCODONE BITARTRATE AND ACETAMINOPHEN 5; 325 MG/1; MG/1
1 TABLET ORAL EVERY 6 HOURS PRN
Status: DISCONTINUED | OUTPATIENT
Start: 2021-09-21 | End: 2021-09-21 | Stop reason: HOSPADM

## 2021-09-21 RX ORDER — FENTANYL CITRATE 50 UG/ML
INJECTION, SOLUTION INTRAMUSCULAR; INTRAVENOUS AS NEEDED
Status: DISCONTINUED | OUTPATIENT
Start: 2021-09-21 | End: 2021-09-21

## 2021-09-21 RX ORDER — MAGNESIUM HYDROXIDE 1200 MG/15ML
LIQUID ORAL AS NEEDED
Status: DISCONTINUED | OUTPATIENT
Start: 2021-09-21 | End: 2021-09-21 | Stop reason: HOSPADM

## 2021-09-21 RX ORDER — ACETAMINOPHEN 325 MG/1
650 TABLET ORAL EVERY 6 HOURS PRN
Status: DISCONTINUED | OUTPATIENT
Start: 2021-09-21 | End: 2021-09-21 | Stop reason: HOSPADM

## 2021-09-21 RX ORDER — LIDOCAINE HYDROCHLORIDE 10 MG/ML
INJECTION, SOLUTION EPIDURAL; INFILTRATION; INTRACAUDAL; PERINEURAL AS NEEDED
Status: DISCONTINUED | OUTPATIENT
Start: 2021-09-21 | End: 2021-09-21

## 2021-09-21 RX ORDER — MIDAZOLAM HYDROCHLORIDE 2 MG/2ML
INJECTION, SOLUTION INTRAMUSCULAR; INTRAVENOUS AS NEEDED
Status: DISCONTINUED | OUTPATIENT
Start: 2021-09-21 | End: 2021-09-21

## 2021-09-21 RX ADMIN — ONDANSETRON 4 MG: 2 INJECTION INTRAMUSCULAR; INTRAVENOUS at 11:56

## 2021-09-21 RX ADMIN — FENTANYL CITRATE 50 MCG: 50 INJECTION, SOLUTION INTRAMUSCULAR; INTRAVENOUS at 12:03

## 2021-09-21 RX ADMIN — LIDOCAINE HYDROCHLORIDE 50 MG: 10 INJECTION, SOLUTION EPIDURAL; INFILTRATION; INTRACAUDAL; PERINEURAL at 11:17

## 2021-09-21 RX ADMIN — FENTANYL CITRATE 50 MCG: 50 INJECTION, SOLUTION INTRAMUSCULAR; INTRAVENOUS at 11:17

## 2021-09-21 RX ADMIN — FENTANYL CITRATE 50 MCG: 50 INJECTION, SOLUTION INTRAMUSCULAR; INTRAVENOUS at 13:10

## 2021-09-21 RX ADMIN — HYDROCODONE BITARTRATE AND ACETAMINOPHEN 1 TABLET: 5; 325 TABLET ORAL at 14:00

## 2021-09-21 RX ADMIN — SODIUM CHLORIDE, SODIUM LACTATE, POTASSIUM CHLORIDE, AND CALCIUM CHLORIDE: .6; .31; .03; .02 INJECTION, SOLUTION INTRAVENOUS at 11:11

## 2021-09-21 RX ADMIN — CEFAZOLIN SODIUM 2000 MG: 2 SOLUTION INTRAVENOUS at 10:52

## 2021-09-21 RX ADMIN — MIDAZOLAM HYDROCHLORIDE 2 MG: 1 INJECTION, SOLUTION INTRAMUSCULAR; INTRAVENOUS at 11:11

## 2021-09-21 RX ADMIN — PROPOFOL 200 MG: 10 INJECTION, EMULSION INTRAVENOUS at 11:17

## 2021-09-21 NOTE — ANESTHESIA PREPROCEDURE EVALUATION
Procedure:  HALEY  DIRECTED LUMPECTOMY (Right Breast)    Relevant Problems   PULMONARY   (+) Complex sleep apnea syndrome   (+) ANA (obstructive sleep apnea)      Other   (+) Intraductal papilloma of breast, right   (+) Obesity, Class I, BMI 30-34 9        Physical Exam    Airway    Mallampati score: II  TM Distance: >3 FB  Neck ROM: full     Dental   No notable dental hx     Cardiovascular  Rhythm: regular, Rate: normal,     Pulmonary  Breath sounds clear to auscultation,     Other Findings        Anesthesia Plan  ASA Score- 3     Anesthesia Type- general with ASA Monitors  Additional Monitors:   Airway Plan:           Plan Factors-Exercise tolerance (METS): >4 METS  Chart reviewed  EKG reviewed  Imaging results reviewed  Existing labs reviewed  Patient summary reviewed  Patient is not a current smoker  Induction- intravenous  Postoperative Plan-     Informed Consent- Anesthetic plan and risks discussed with patient  I personally reviewed this patient with the CRNA  Discussed and agreed on the Anesthesia Plan with the CRNA  Randell Cosme

## 2021-09-21 NOTE — ANESTHESIA POSTPROCEDURE EVALUATION
Post-Op Assessment Note    CV Status:  Stable  Pain Score: 0    Pain management: adequate     Mental Status:  Sleepy   Hydration Status:  Stable   PONV Controlled:  Controlled   Airway Patency:  Patent and adequate   Two or more mitigation strategies used for obstructive sleep apnea   Post Op Vitals Reviewed: Yes      Staff: CRNA         No complications documented      BP (!) (P) 179/102 (09/21/21 1224)    Temp (P) 98 4 °F (36 9 °C) (09/21/21 1224)    Pulse     Resp      SpO2

## 2021-10-07 ENCOUNTER — OFFICE VISIT (OUTPATIENT)
Dept: SURGICAL ONCOLOGY | Facility: CLINIC | Age: 71
End: 2021-10-07

## 2021-10-07 VITALS
BODY MASS INDEX: 33.99 KG/M2 | SYSTOLIC BLOOD PRESSURE: 130 MMHG | DIASTOLIC BLOOD PRESSURE: 80 MMHG | HEIGHT: 65 IN | HEART RATE: 97 BPM | RESPIRATION RATE: 14 BRPM | OXYGEN SATURATION: 96 % | WEIGHT: 204 LBS

## 2021-10-07 DIAGNOSIS — N64.52 BLOODY DISCHARGE FROM LEFT NIPPLE: ICD-10-CM

## 2021-10-07 DIAGNOSIS — Z12.31 SCREENING MAMMOGRAM, ENCOUNTER FOR: ICD-10-CM

## 2021-10-07 DIAGNOSIS — D24.1 INTRADUCTAL PAPILLOMA OF BREAST, RIGHT: Primary | ICD-10-CM

## 2021-10-07 DIAGNOSIS — Z98.890 STATUS POST RIGHT BREAST LUMPECTOMY: ICD-10-CM

## 2021-10-07 PROCEDURE — 1124F ACP DISCUSS-NO DSCNMKR DOCD: CPT | Performed by: SURGERY

## 2021-10-07 PROCEDURE — 99024 POSTOP FOLLOW-UP VISIT: CPT | Performed by: SURGERY

## 2021-10-14 ENCOUNTER — HOSPITAL ENCOUNTER (OUTPATIENT)
Dept: MRI IMAGING | Facility: HOSPITAL | Age: 71
Discharge: HOME | End: 2021-10-14
Payer: MEDICARE

## 2021-10-14 DIAGNOSIS — R52 PAIN: ICD-10-CM

## 2021-10-14 PROCEDURE — 73721 MRI JNT OF LWR EXTRE W/O DYE: CPT

## 2021-10-14 PROCEDURE — G1004 CDSM NDSC: HCPCS

## 2021-11-02 ENCOUNTER — HOSPITAL ENCOUNTER (OUTPATIENT)
Dept: ULTRASOUND IMAGING | Facility: CLINIC | Age: 71
Discharge: HOME | End: 2021-11-02
Payer: MEDICARE

## 2021-11-02 ENCOUNTER — HOSPITAL ENCOUNTER (OUTPATIENT)
Dept: MAMMOGRAPHY | Facility: CLINIC | Age: 71
Discharge: HOME | End: 2021-11-02
Payer: MEDICARE

## 2021-11-02 VITALS — HEIGHT: 65 IN | BODY MASS INDEX: 33.99 KG/M2 | WEIGHT: 204 LBS

## 2021-11-02 DIAGNOSIS — N64.52 BLOODY DISCHARGE FROM LEFT NIPPLE: ICD-10-CM

## 2021-11-02 PROCEDURE — G0279 TOMOSYNTHESIS, MAMMO: HCPCS

## 2021-11-02 PROCEDURE — 76642 ULTRASOUND BREAST LIMITED: CPT

## 2021-11-02 PROCEDURE — 77065 DX MAMMO INCL CAD UNI: CPT

## 2021-11-11 PROBLEM — N64.52 BLOODY DISCHARGE FROM LEFT NIPPLE: Status: ACTIVE | Noted: 2021-11-11

## 2021-11-12 ENCOUNTER — OFFICE VISIT (OUTPATIENT)
Dept: SURGICAL ONCOLOGY | Facility: CLINIC | Age: 71
End: 2021-11-12

## 2021-11-12 VITALS
HEART RATE: 94 BPM | SYSTOLIC BLOOD PRESSURE: 124 MMHG | HEIGHT: 65 IN | DIASTOLIC BLOOD PRESSURE: 70 MMHG | OXYGEN SATURATION: 97 % | BODY MASS INDEX: 34.32 KG/M2 | TEMPERATURE: 97.9 F | WEIGHT: 206 LBS | RESPIRATION RATE: 14 BRPM

## 2021-11-12 DIAGNOSIS — N64.52 BLOODY DISCHARGE FROM LEFT NIPPLE: Primary | ICD-10-CM

## 2021-11-12 PROCEDURE — 99024 POSTOP FOLLOW-UP VISIT: CPT | Performed by: SURGERY

## 2021-11-12 RX ORDER — IBUPROFEN 400 MG/1
TABLET ORAL AS NEEDED
COMMUNITY

## 2022-04-11 ENCOUNTER — PREP FOR PROCEDURE (OUTPATIENT)
Dept: OBGYN CLINIC | Facility: OTHER | Age: 72
End: 2022-04-11

## 2022-04-11 DIAGNOSIS — M17.12 PRIMARY OSTEOARTHRITIS OF LEFT KNEE: Primary | ICD-10-CM

## 2022-05-10 ENCOUNTER — OFFICE VISIT (OUTPATIENT)
Dept: SLEEP CENTER | Facility: CLINIC | Age: 72
End: 2022-05-10
Payer: MEDICARE

## 2022-05-10 VITALS
DIASTOLIC BLOOD PRESSURE: 86 MMHG | WEIGHT: 200.4 LBS | SYSTOLIC BLOOD PRESSURE: 120 MMHG | BODY MASS INDEX: 33.39 KG/M2 | HEART RATE: 97 BPM | HEIGHT: 65 IN

## 2022-05-10 DIAGNOSIS — G47.31 COMPLEX SLEEP APNEA SYNDROME: Primary | ICD-10-CM

## 2022-05-10 PROCEDURE — 99213 OFFICE O/P EST LOW 20 MIN: CPT | Performed by: INTERNAL MEDICINE

## 2022-05-10 NOTE — PROGRESS NOTES
Progress Note - Sleep Center   Donell Blanco ROMERO:[birth year] MRN: [id number]      Reason for Visit:  71 y  o female here for annual follow-up    Assessment:  Doing well on current therapy of ASV for complex sleep apnea  Plan:  Continue same    Follow up: One year    History of Present Illness:  History of ANA on PAP therapy  Fully compliant and deriving benefit      Review of Systems      Genitourinary post menopausal (no peroids)   Cardiology none   Gastrointestinal none   Neurology none   Constitutional claustrophobia and excessive sweating at night   Integumentary none   Psychiatry anxiety and depression   Musculoskeletal joint pain and sciatica   Pulmonary snoring   ENT none   Endocrine none   Hematological none         I have reviewed and updated the review of systems as necessary      Historical Information    Past Medical History:   Past Medical History:   Diagnosis Date    Anxiety     BiPAP (biphasic positive airway pressure) dependence     Breast cyst     Left    Chronic kidney disease     Depression     Elevated liver enzymes     GERD (gastroesophageal reflux disease)     Hepatitis B 1974    Sleep apnea          Past Surgical History:   Past Surgical History:   Procedure Laterality Date    BREAST BIOPSY Right 08/06/2021    intraductal papiloma    BREAST LUMPECTOMY Right 9/21/2021    Procedure: HALEY  DIRECTED LUMPECTOMY;  Surgeon: Saji Jiménez MD;  Location: [facility] OR;  Service: Surgical Oncology    HAND LIGAMENT RECONSTRUCTION Right     MRI BREAST BIOPSY RIGHT (ALL INCLUSIVE) Right 8/6/2021     BREAST HALEY  NEEDLE LOC RIGHT Right 9/13/2021       Social History:   Social History     Socioeconomic History    Marital status: /Civil Union     Spouse name: None    Number of children: None    Years of education: None    Highest education level: None   Occupational History    None   Tobacco Use    Smoking status: Former Smoker     Quit date: 1986     Years since quittin 3    Smokeless tobacco: Former User   Vaping Use    Vaping Use: Never used   Substance and Sexual Activity    Alcohol use: Yes     Comment: rarely    Drug use: No    Sexual activity: Not Currently   Other Topics Concern    None   Social History Narrative    None     Social Determinants of Health     Financial Resource Strain: Not on file   Food Insecurity: Not on file   Transportation Needs: Not on file   Physical Activity: Not on file   Stress: Not on file   Social Connections: Not on file   Intimate Partner Violence: Not on file   Housing Stability: Not on file       Family History:   Family History   Problem Relation Age of Onset    Colon cancer Mother 55    Emphysema Father     Breast cancer Sister 72    No Known Problems Maternal Grandmother     No Known Problems Maternal Grandfather     No Known Problems Paternal Grandmother     No Known Problems Paternal Grandfather     No Known Problems Sister     No Known Problems Maternal Aunt     No Known Problems Maternal Aunt     No Known Problems Paternal Aunt     No Known Problems Paternal Aunt        Medications/Allergies:      Current Outpatient Medications:     ARIPiprazole (ABILIFY) 10 mg tablet, Take 10 mg by mouth daily, Disp: , Rfl: 0    buPROPion (WELLBUTRIN XL) 150 mg 24 hr tablet, Take 150 mg by mouth 3 (three) times a day, Disp: , Rfl: 5    docusate sodium (Colace) 100 mg capsule, , Disp: , Rfl:     DULoxetine (CYMBALTA) 60 mg delayed release capsule, Take by mouth daily, Disp: , Rfl:     famotidine (PEPCID) 20 mg tablet, Take by mouth, Disp: , Rfl:     ibuprofen (MOTRIN) 400 mg tablet, Take by mouth as needed for mild pain, Disp: , Rfl:     lamoTRIgine (LAMICTAL) 200 MG tablet, Take by mouth daily, Disp: , Rfl:     LIVALO 2 MG, Take 2 mg by mouth daily, Disp: , Rfl: 3    LORazepam (ATIVAN) 1 mg tablet, Take 1 mg by mouth 3 (three) times a day as needed, Disp: , Rfl:     meloxicam (MOBIC) 7 5 mg tablet, Take 7 5 mg by mouth daily, Disp: , Rfl:     senna (SENOKOT) 8 6 mg, Take by mouth daily, Disp: , Rfl:     acetaminophen-codeine (TYLENOL #3) 300-30 mg per tablet, Take 1 tablet by mouth every 4 (four) hours as needed for moderate pain (Patient not taking: Reported on 11/12/2021 ), Disp: 30 tablet, Rfl: 0    naloxone (NARCAN) 4 mg/0 1 mL nasal spray, Administer 1 spray into a nostril  If no response after 2-3 minutes, give another dose in the other nostril using a new spray  (Patient not taking: Reported on 10/7/2021), Disp: 1 each, Rfl: 1          Objective      Vital Signs:   Vitals:    05/10/22 1335   BP: 120/86   Pulse: 97     [place] Sleepiness Scale: Total score: 3        Physical Exam:    General: Alert, appropriate, cooperative, overweight    Head: NC/AT    Skin: Warm, dry    Neuro: No motor abnormalities, cranial nerves appear intact    Extremity: No clubbing, cyanosis      DME Provider: [facility]        Counseling / Coordination of Care   I have spent 10 minutes with the patient today in which greater than 50% of this time was spent in counseling/coordination of care regarding: equipment and compliance  Board Certified Sleep Specialist    Portions of the record may have been created with voice recognition software  Occasional wrong word or "sound a like" substitutions may have occurred due to the inherent limitations of voice recognition software  Read the chart carefully and recognize, using context, where substitutions have occurred

## 2022-05-11 ENCOUNTER — TELEPHONE (OUTPATIENT)
Dept: SLEEP CENTER | Facility: CLINIC | Age: 72
End: 2022-05-11

## 2022-05-11 NOTE — TELEPHONE ENCOUNTER
Left message for patient to call office to clarify DME provider  Will need to send order for CPAP supplies to preferred provider

## 2022-05-12 NOTE — TELEPHONE ENCOUNTER
Patient called and wants to use Janene Wiggins as DME provider     RX for supply's and old sleep studies and clinical notes sent to [address] [place] [address]

## 2022-08-24 ENCOUNTER — ANESTHESIA EVENT (OUTPATIENT)
Dept: PERIOP | Facility: HOSPITAL | Age: 72
End: 2022-08-24
Payer: MEDICARE

## 2022-08-27 ENCOUNTER — APPOINTMENT (OUTPATIENT)
Dept: RADIOLOGY | Facility: CLINIC | Age: 72
End: 2022-08-27
Payer: MEDICARE

## 2022-08-27 ENCOUNTER — CLINICAL SUPPORT (OUTPATIENT)
Dept: URGENT CARE | Facility: CLINIC | Age: 72
End: 2022-08-27
Payer: MEDICARE

## 2022-08-27 ENCOUNTER — TRANSCRIBE ORDERS (OUTPATIENT)
Dept: URGENT CARE | Facility: CLINIC | Age: 72
End: 2022-08-27

## 2022-08-27 DIAGNOSIS — Z01.818 OTHER SPECIFIED PRE-OPERATIVE EXAMINATION: Primary | ICD-10-CM

## 2022-08-27 DIAGNOSIS — Z01.818 PREPROCEDURAL GENERAL PHYSICAL EXAMINATION: Primary | ICD-10-CM

## 2022-08-27 DIAGNOSIS — Z01.818 PREPROCEDURAL GENERAL PHYSICAL EXAMINATION: ICD-10-CM

## 2022-08-27 DIAGNOSIS — Z01.812 PRE-OPERATIVE LABORATORY EXAMINATION: ICD-10-CM

## 2022-08-27 PROCEDURE — 93005 ELECTROCARDIOGRAM TRACING: CPT

## 2022-08-27 PROCEDURE — 71046 X-RAY EXAM CHEST 2 VIEWS: CPT

## 2022-08-29 ENCOUNTER — APPOINTMENT (OUTPATIENT)
Dept: LAB | Facility: CLINIC | Age: 72
End: 2022-08-29
Payer: MEDICARE

## 2022-08-29 DIAGNOSIS — Z01.818 PREPROCEDURAL GENERAL PHYSICAL EXAMINATION: ICD-10-CM

## 2022-08-29 DIAGNOSIS — Z01.812 PRE-OPERATIVE LABORATORY EXAMINATION: ICD-10-CM

## 2022-08-29 DIAGNOSIS — M17.12 PRIMARY OSTEOARTHRITIS OF LEFT KNEE: ICD-10-CM

## 2022-08-29 LAB
ALBUMIN SERPL BCP-MCNC: 3.3 G/DL (ref 3.5–5)
ALP SERPL-CCNC: 150 U/L (ref 46–116)
ALT SERPL W P-5'-P-CCNC: 33 U/L (ref 12–78)
ANION GAP SERPL CALCULATED.3IONS-SCNC: 3 MMOL/L (ref 4–13)
AST SERPL W P-5'-P-CCNC: 18 U/L (ref 5–45)
ATRIAL RATE: 91 BPM
ATRIAL RATE: 91 BPM
BACTERIA UR QL AUTO: ABNORMAL /HPF
BASOPHILS # BLD AUTO: 0.04 THOUSANDS/ΜL (ref 0–0.1)
BASOPHILS NFR BLD AUTO: 0 % (ref 0–1)
BILIRUB SERPL-MCNC: 0.54 MG/DL (ref 0.2–1)
BILIRUB UR QL STRIP: NEGATIVE
BUN SERPL-MCNC: 12 MG/DL (ref 5–25)
CALCIUM ALBUM COR SERPL-MCNC: 9.4 MG/DL (ref 8.3–10.1)
CALCIUM SERPL-MCNC: 8.8 MG/DL (ref 8.3–10.1)
CHLORIDE SERPL-SCNC: 110 MMOL/L (ref 96–108)
CLARITY UR: ABNORMAL
CO2 SERPL-SCNC: 27 MMOL/L (ref 21–32)
COLOR UR: YELLOW
CREAT SERPL-MCNC: 1.19 MG/DL (ref 0.6–1.3)
EOSINOPHIL # BLD AUTO: 0.33 THOUSAND/ΜL (ref 0–0.61)
EOSINOPHIL NFR BLD AUTO: 3 % (ref 0–6)
ERYTHROCYTE [DISTWIDTH] IN BLOOD BY AUTOMATED COUNT: 14.6 % (ref 11.6–15.1)
GFR SERPL CREATININE-BSD FRML MDRD: 46 ML/MIN/1.73SQ M
GLUCOSE P FAST SERPL-MCNC: 116 MG/DL (ref 65–99)
GLUCOSE UR STRIP-MCNC: NEGATIVE MG/DL
HCT VFR BLD AUTO: 44.3 % (ref 34.8–46.1)
HGB BLD-MCNC: 14.7 G/DL (ref 11.5–15.4)
HGB UR QL STRIP.AUTO: NEGATIVE
HYALINE CASTS #/AREA URNS LPF: ABNORMAL /LPF
IMM GRANULOCYTES # BLD AUTO: 0.02 THOUSAND/UL (ref 0–0.2)
IMM GRANULOCYTES NFR BLD AUTO: 0 % (ref 0–2)
KETONES UR STRIP-MCNC: NEGATIVE MG/DL
LEUKOCYTE ESTERASE UR QL STRIP: ABNORMAL
LYMPHOCYTES # BLD AUTO: 3.54 THOUSANDS/ΜL (ref 0.6–4.47)
LYMPHOCYTES NFR BLD AUTO: 34 % (ref 14–44)
MCH RBC QN AUTO: 29.9 PG (ref 26.8–34.3)
MCHC RBC AUTO-ENTMCNC: 33.2 G/DL (ref 31.4–37.4)
MCV RBC AUTO: 90 FL (ref 82–98)
MONOCYTES # BLD AUTO: 0.7 THOUSAND/ΜL (ref 0.17–1.22)
MONOCYTES NFR BLD AUTO: 7 % (ref 4–12)
MUCOUS THREADS UR QL AUTO: ABNORMAL
NEUTROPHILS # BLD AUTO: 5.68 THOUSANDS/ΜL (ref 1.85–7.62)
NEUTS SEG NFR BLD AUTO: 56 % (ref 43–75)
NITRITE UR QL STRIP: NEGATIVE
NON-SQ EPI CELLS URNS QL MICRO: ABNORMAL /HPF
NRBC BLD AUTO-RTO: 0 /100 WBCS
P AXIS: 17 DEGREES
P AXIS: 17 DEGREES
PH UR STRIP.AUTO: 6 [PH]
PLATELET # BLD AUTO: 351 THOUSANDS/UL (ref 149–390)
PMV BLD AUTO: 9.8 FL (ref 8.9–12.7)
POTASSIUM SERPL-SCNC: 4.5 MMOL/L (ref 3.5–5.3)
PR INTERVAL: 142 MS
PR INTERVAL: 142 MS
PROT SERPL-MCNC: 7.3 G/DL (ref 6.4–8.4)
PROT UR STRIP-MCNC: ABNORMAL MG/DL
QRS AXIS: 43 DEGREES
QRS AXIS: 43 DEGREES
QRSD INTERVAL: 66 MS
QRSD INTERVAL: 66 MS
QT INTERVAL: 362 MS
QT INTERVAL: 362 MS
QTC INTERVAL: 445 MS
QTC INTERVAL: 445 MS
RBC # BLD AUTO: 4.92 MILLION/UL (ref 3.81–5.12)
RBC #/AREA URNS AUTO: ABNORMAL /HPF
RENAL EPI CELLS #/AREA URNS HPF: PRESENT /[HPF]
SODIUM SERPL-SCNC: 140 MMOL/L (ref 135–147)
SP GR UR STRIP.AUTO: 1.02 (ref 1–1.03)
T WAVE AXIS: 42 DEGREES
T WAVE AXIS: 42 DEGREES
TRANS CELLS #/AREA URNS HPF: PRESENT /[HPF]
UROBILINOGEN UR STRIP-ACNC: <2 MG/DL
VENTRICULAR RATE: 91 BPM
VENTRICULAR RATE: 91 BPM
WBC # BLD AUTO: 10.31 THOUSAND/UL (ref 4.31–10.16)
WBC #/AREA URNS AUTO: ABNORMAL /HPF

## 2022-08-29 PROCEDURE — 36415 COLL VENOUS BLD VENIPUNCTURE: CPT

## 2022-08-29 PROCEDURE — 81001 URINALYSIS AUTO W/SCOPE: CPT | Performed by: ORTHOPAEDIC SURGERY

## 2022-08-29 PROCEDURE — 80053 COMPREHEN METABOLIC PANEL: CPT

## 2022-08-29 PROCEDURE — 85025 COMPLETE CBC W/AUTO DIFF WBC: CPT

## 2022-08-29 PROCEDURE — 93010 ELECTROCARDIOGRAM REPORT: CPT | Performed by: INTERNAL MEDICINE

## 2022-08-29 PROCEDURE — 86900 BLOOD TYPING SEROLOGIC ABO: CPT

## 2022-08-29 PROCEDURE — 86850 RBC ANTIBODY SCREEN: CPT

## 2022-08-29 PROCEDURE — 86901 BLOOD TYPING SEROLOGIC RH(D): CPT

## 2022-08-30 LAB
ABO GROUP BLD: NORMAL
BLD GP AB SCN SERPL QL: NEGATIVE
RH BLD: POSITIVE
SPECIMEN EXPIRATION DATE: NORMAL

## 2022-09-02 ENCOUNTER — APPOINTMENT (OUTPATIENT)
Dept: LAB | Facility: CLINIC | Age: 72
End: 2022-09-02
Payer: MEDICARE

## 2022-09-02 ENCOUNTER — TRANSCRIBE ORDERS (OUTPATIENT)
Dept: LAB | Facility: CLINIC | Age: 72
End: 2022-09-02

## 2022-09-02 DIAGNOSIS — R73.09 IMPAIRED GLUCOSE TOLERANCE TEST: Primary | ICD-10-CM

## 2022-09-02 DIAGNOSIS — R73.09 IMPAIRED GLUCOSE TOLERANCE TEST: ICD-10-CM

## 2022-09-02 LAB
EST. AVERAGE GLUCOSE BLD GHB EST-MCNC: 126 MG/DL
HBA1C MFR BLD: 6 %

## 2022-09-02 PROCEDURE — 83036 HEMOGLOBIN GLYCOSYLATED A1C: CPT

## 2022-09-02 PROCEDURE — 36415 COLL VENOUS BLD VENIPUNCTURE: CPT

## 2022-09-08 RX ORDER — ACETAMINOPHEN 500 MG
500 TABLET ORAL EVERY 6 HOURS PRN
COMMUNITY

## 2022-09-08 RX ORDER — MELATONIN
1000 DAILY
COMMUNITY

## 2022-09-08 RX ORDER — VITAMIN B COMPLEX
1 CAPSULE ORAL DAILY
COMMUNITY

## 2022-09-08 NOTE — PRE-PROCEDURE INSTRUCTIONS
Pre-Surgery Instructions:   Medication Instructions    acetaminophen (TYLENOL) 500 mg tablet Uses PRN- OK to take day of surgery    ARIPiprazole (ABILIFY) 10 mg tablet Take day of surgery   b complex vitamins capsule Stop taking 7 days prior to surgery   buPROPion (WELLBUTRIN XL) 150 mg 24 hr tablet Take day of surgery   cholecalciferol (VITAMIN D3) 1,000 units tablet Stop taking 7 days prior to surgery   docusate sodium (COLACE) 100 mg capsule Take day of surgery   DULoxetine (CYMBALTA) 60 mg delayed release capsule Take day of surgery   famotidine (PEPCID) 20 mg tablet Take day of surgery   LIVALO 2 MG Take day of surgery   LORazepam (ATIVAN) 1 mg tablet Uses PRN- OK to take day of surgery    meloxicam (MOBIC) 7 5 mg tablet Stop taking 3 days prior to surgery   senna (SENOKOT) 8 6 mg Hold day of surgery  Covid screening negative as per patient  Fully vaccinated  Reviewed showering and medication instructions  Take medications morning of surgery with a small sip of water  Patient verbalized understanding  Advised NPO after MN and ASC will call with scheduled surgical time  Ortho class and incentive spirometry education reviewed  Patient verbalized understanding  Patient able to live on one level and  will be assisting her afterwards  See Geriatric Assessment below        Cognitive Assessment:    CAM:    TUG <15 sec:   Falls (last 6 months): 0   Hand  score:  -Attempt 1:  -Attempt 2:  -Attempt 3:   Dima Total Score: 22  Arthritis limits full mobility   PHQ- 9 Depression Scale:1  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 (quit smoking, wt  loss, rest, decrease stress) "Getting back to the gym "    -What brings you strength? (family, friends, Gnosticism, health) "Family and good health "    -What activities are important to you? (exercise, reading, travel, work) "Walking, gym, crafts "

## 2022-09-21 ENCOUNTER — HOSPITAL ENCOUNTER (OUTPATIENT)
Facility: HOSPITAL | Age: 72
Setting detail: OUTPATIENT SURGERY
Discharge: HOME | End: 2022-09-22
Attending: ORTHOPAEDIC SURGERY | Admitting: ORTHOPAEDIC SURGERY
Payer: MEDICARE

## 2022-09-21 ENCOUNTER — APPOINTMENT (OUTPATIENT)
Dept: RADIOLOGY | Facility: HOSPITAL | Age: 72
End: 2022-09-21
Payer: MEDICARE

## 2022-09-21 ENCOUNTER — ANESTHESIA (OUTPATIENT)
Dept: PERIOP | Facility: HOSPITAL | Age: 72
End: 2022-09-21
Payer: MEDICARE

## 2022-09-21 DIAGNOSIS — M17.12 PRIMARY OSTEOARTHRITIS OF LEFT KNEE: Primary | ICD-10-CM

## 2022-09-21 DIAGNOSIS — N18.31 STAGE 3A CHRONIC KIDNEY DISEASE (HCC): ICD-10-CM

## 2022-09-21 LAB
ABO GROUP BLD: NORMAL
FLUAV RNA RESP QL NAA+PROBE: NEGATIVE
FLUBV RNA RESP QL NAA+PROBE: NEGATIVE
RH BLD: POSITIVE
RSV RNA RESP QL NAA+PROBE: NEGATIVE
SARS-COV-2 RNA RESP QL NAA+PROBE: NEGATIVE

## 2022-09-21 PROCEDURE — C1776 JOINT DEVICE (IMPLANTABLE): HCPCS | Performed by: ORTHOPAEDIC SURGERY

## 2022-09-21 PROCEDURE — C1713 ANCHOR/SCREW BN/BN,TIS/BN: HCPCS | Performed by: ORTHOPAEDIC SURGERY

## 2022-09-21 PROCEDURE — 97163 PT EVAL HIGH COMPLEX 45 MIN: CPT

## 2022-09-21 PROCEDURE — 73560 X-RAY EXAM OF KNEE 1 OR 2: CPT

## 2022-09-21 PROCEDURE — 0241U HB NFCT DS VIR RESP RNA 4 TRGT: CPT | Performed by: ORTHOPAEDIC SURGERY

## 2022-09-21 DEVICE — ATTUNE KNEE SYSTEM TIBIAL INSERT ROTATING PLATFORM POSTERIOR STABILIZED 6 6MM AOX
Type: IMPLANTABLE DEVICE | Site: KNEE | Status: FUNCTIONAL
Brand: ATTUNE

## 2022-09-21 DEVICE — ATTUNE KNEE SYSTEM FEMORAL POSTERIOR STABILIZED NARROW SIZE 6N LEFT CEMENTED
Type: IMPLANTABLE DEVICE | Site: KNEE | Status: FUNCTIONAL
Brand: ATTUNE

## 2022-09-21 DEVICE — ATTUNE PATELLA MEDIALIZED ANATOMIC 32MM CEMENTED AOX
Type: IMPLANTABLE DEVICE | Site: KNEE | Status: FUNCTIONAL
Brand: ATTUNE

## 2022-09-21 DEVICE — SMARTSET HIGH PERFORMANCE MV MEDIUM VISCOSITY BONE CEMENT 40G
Type: IMPLANTABLE DEVICE | Site: KNEE | Status: FUNCTIONAL
Brand: SMARTSET

## 2022-09-21 DEVICE — ATTUNE KNEE SYSTEM TIBIAL BASE ROTATING PLATFORM SIZE 5 CEMENTED
Type: IMPLANTABLE DEVICE | Site: KNEE | Status: FUNCTIONAL
Brand: ATTUNE

## 2022-09-21 RX ORDER — SODIUM CHLORIDE, SODIUM LACTATE, POTASSIUM CHLORIDE, CALCIUM CHLORIDE 600; 310; 30; 20 MG/100ML; MG/100ML; MG/100ML; MG/100ML
125 INJECTION, SOLUTION INTRAVENOUS CONTINUOUS
Status: DISCONTINUED | OUTPATIENT
Start: 2022-09-21 | End: 2022-09-22 | Stop reason: HOSPADM

## 2022-09-21 RX ORDER — FENTANYL CITRATE 50 UG/ML
INJECTION, SOLUTION INTRAMUSCULAR; INTRAVENOUS
Status: COMPLETED | OUTPATIENT
Start: 2022-09-21 | End: 2022-09-21

## 2022-09-21 RX ORDER — FAMOTIDINE 20 MG/1
10 TABLET, FILM COATED ORAL DAILY
Status: DISCONTINUED | OUTPATIENT
Start: 2022-09-21 | End: 2022-09-22 | Stop reason: HOSPADM

## 2022-09-21 RX ORDER — MIDAZOLAM HYDROCHLORIDE 2 MG/2ML
INJECTION, SOLUTION INTRAMUSCULAR; INTRAVENOUS
Status: COMPLETED | OUTPATIENT
Start: 2022-09-21 | End: 2022-09-21

## 2022-09-21 RX ORDER — OXYCODONE HYDROCHLORIDE 10 MG/1
10 TABLET ORAL EVERY 4 HOURS PRN
Status: DISCONTINUED | OUTPATIENT
Start: 2022-09-21 | End: 2022-09-22 | Stop reason: HOSPADM

## 2022-09-21 RX ORDER — HYDROMORPHONE HCL/PF 1 MG/ML
0.5 SYRINGE (ML) INJECTION EVERY 2 HOUR PRN
Status: DISCONTINUED | OUTPATIENT
Start: 2022-09-21 | End: 2022-09-22 | Stop reason: HOSPADM

## 2022-09-21 RX ORDER — ARIPIPRAZOLE 10 MG/1
10 TABLET ORAL DAILY
Status: DISCONTINUED | OUTPATIENT
Start: 2022-09-22 | End: 2022-09-22 | Stop reason: HOSPADM

## 2022-09-21 RX ORDER — SODIUM CHLORIDE, SODIUM LACTATE, POTASSIUM CHLORIDE, CALCIUM CHLORIDE 600; 310; 30; 20 MG/100ML; MG/100ML; MG/100ML; MG/100ML
100 INJECTION, SOLUTION INTRAVENOUS CONTINUOUS
Status: DISCONTINUED | OUTPATIENT
Start: 2022-09-21 | End: 2022-09-22 | Stop reason: HOSPADM

## 2022-09-21 RX ORDER — PROPOFOL 10 MG/ML
INJECTION, EMULSION INTRAVENOUS AS NEEDED
Status: DISCONTINUED | OUTPATIENT
Start: 2022-09-21 | End: 2022-09-21

## 2022-09-21 RX ORDER — MELATONIN
1000 DAILY
Status: DISCONTINUED | OUTPATIENT
Start: 2022-09-22 | End: 2022-09-22 | Stop reason: HOSPADM

## 2022-09-21 RX ORDER — ROPIVACAINE HYDROCHLORIDE 5 MG/ML
INJECTION, SOLUTION EPIDURAL; INFILTRATION; PERINEURAL
Status: COMPLETED | OUTPATIENT
Start: 2022-09-21 | End: 2022-09-21

## 2022-09-21 RX ORDER — TRANEXAMIC ACID 100 MG/ML
INJECTION, SOLUTION INTRAVENOUS AS NEEDED
Status: DISCONTINUED | OUTPATIENT
Start: 2022-09-21 | End: 2022-09-21

## 2022-09-21 RX ORDER — BUPROPION HYDROCHLORIDE 150 MG/1
150 TABLET ORAL DAILY
Status: DISCONTINUED | OUTPATIENT
Start: 2022-09-21 | End: 2022-09-22 | Stop reason: HOSPADM

## 2022-09-21 RX ORDER — HYDROMORPHONE HCL/PF 1 MG/ML
0.5 SYRINGE (ML) INJECTION
Status: DISCONTINUED | OUTPATIENT
Start: 2022-09-21 | End: 2022-09-21 | Stop reason: HOSPADM

## 2022-09-21 RX ORDER — BUPIVACAINE HYDROCHLORIDE 7.5 MG/ML
INJECTION, SOLUTION INTRASPINAL AS NEEDED
Status: DISCONTINUED | OUTPATIENT
Start: 2022-09-21 | End: 2022-09-21

## 2022-09-21 RX ORDER — DULOXETIN HYDROCHLORIDE 30 MG/1
60 CAPSULE, DELAYED RELEASE ORAL DAILY
Status: DISCONTINUED | OUTPATIENT
Start: 2022-09-22 | End: 2022-09-22 | Stop reason: HOSPADM

## 2022-09-21 RX ORDER — FENTANYL CITRATE/PF 50 MCG/ML
25 SYRINGE (ML) INJECTION
Status: DISCONTINUED | OUTPATIENT
Start: 2022-09-21 | End: 2022-09-21 | Stop reason: HOSPADM

## 2022-09-21 RX ORDER — PROPOFOL 10 MG/ML
INJECTION, EMULSION INTRAVENOUS CONTINUOUS PRN
Status: DISCONTINUED | OUTPATIENT
Start: 2022-09-21 | End: 2022-09-21

## 2022-09-21 RX ORDER — SENNOSIDES 8.6 MG
1 TABLET ORAL DAILY
Status: DISCONTINUED | OUTPATIENT
Start: 2022-09-21 | End: 2022-09-22 | Stop reason: HOSPADM

## 2022-09-21 RX ORDER — OXYCODONE HYDROCHLORIDE 5 MG/1
5 TABLET ORAL EVERY 4 HOURS PRN
Status: DISCONTINUED | OUTPATIENT
Start: 2022-09-21 | End: 2022-09-22 | Stop reason: HOSPADM

## 2022-09-21 RX ORDER — DOCUSATE SODIUM 100 MG/1
100 CAPSULE, LIQUID FILLED ORAL 2 TIMES DAILY
Status: DISCONTINUED | OUTPATIENT
Start: 2022-09-21 | End: 2022-09-22 | Stop reason: HOSPADM

## 2022-09-21 RX ORDER — MAGNESIUM HYDROXIDE/ALUMINUM HYDROXICE/SIMETHICONE 120; 1200; 1200 MG/30ML; MG/30ML; MG/30ML
30 SUSPENSION ORAL 2 TIMES DAILY PRN
Status: DISCONTINUED | OUTPATIENT
Start: 2022-09-21 | End: 2022-09-22 | Stop reason: HOSPADM

## 2022-09-21 RX ORDER — ONDANSETRON 2 MG/ML
4 INJECTION INTRAMUSCULAR; INTRAVENOUS EVERY 8 HOURS PRN
Status: DISCONTINUED | OUTPATIENT
Start: 2022-09-21 | End: 2022-09-22 | Stop reason: HOSPADM

## 2022-09-21 RX ORDER — MAGNESIUM HYDROXIDE 1200 MG/15ML
LIQUID ORAL AS NEEDED
Status: DISCONTINUED | OUTPATIENT
Start: 2022-09-21 | End: 2022-09-21 | Stop reason: HOSPADM

## 2022-09-21 RX ORDER — ONDANSETRON 2 MG/ML
INJECTION INTRAMUSCULAR; INTRAVENOUS AS NEEDED
Status: DISCONTINUED | OUTPATIENT
Start: 2022-09-21 | End: 2022-09-21

## 2022-09-21 RX ORDER — DEXAMETHASONE SODIUM PHOSPHATE 10 MG/ML
INJECTION, SOLUTION INTRAMUSCULAR; INTRAVENOUS AS NEEDED
Status: DISCONTINUED | OUTPATIENT
Start: 2022-09-21 | End: 2022-09-21

## 2022-09-21 RX ORDER — ASPIRIN 325 MG
325 TABLET ORAL 2 TIMES DAILY
Status: DISCONTINUED | OUTPATIENT
Start: 2022-09-21 | End: 2022-09-22 | Stop reason: HOSPADM

## 2022-09-21 RX ORDER — ONDANSETRON 2 MG/ML
4 INJECTION INTRAMUSCULAR; INTRAVENOUS ONCE AS NEEDED
Status: DISCONTINUED | OUTPATIENT
Start: 2022-09-21 | End: 2022-09-21 | Stop reason: HOSPADM

## 2022-09-21 RX ORDER — CEFAZOLIN SODIUM 1 G/50ML
1000 SOLUTION INTRAVENOUS EVERY 8 HOURS
Status: COMPLETED | OUTPATIENT
Start: 2022-09-21 | End: 2022-09-22

## 2022-09-21 RX ORDER — CEFAZOLIN SODIUM 2 G/50ML
2000 SOLUTION INTRAVENOUS ONCE
Status: COMPLETED | OUTPATIENT
Start: 2022-09-21 | End: 2022-09-21

## 2022-09-21 RX ORDER — ACETAMINOPHEN 325 MG/1
650 TABLET ORAL EVERY 6 HOURS PRN
Status: DISCONTINUED | OUTPATIENT
Start: 2022-09-21 | End: 2022-09-22 | Stop reason: HOSPADM

## 2022-09-21 RX ORDER — FERROUS SULFATE 325(65) MG
325 TABLET ORAL
Status: DISCONTINUED | OUTPATIENT
Start: 2022-09-22 | End: 2022-09-22 | Stop reason: HOSPADM

## 2022-09-21 RX ADMIN — FENTANYL CITRATE 100 MCG: 50 INJECTION INTRAMUSCULAR; INTRAVENOUS at 08:50

## 2022-09-21 RX ADMIN — ASPIRIN 325 MG ORAL TABLET 325 MG: 325 PILL ORAL at 17:21

## 2022-09-21 RX ADMIN — TRANEXAMIC ACID 1000 MG: 1 INJECTION, SOLUTION INTRAVENOUS at 09:40

## 2022-09-21 RX ADMIN — SODIUM CHLORIDE, SODIUM LACTATE, POTASSIUM CHLORIDE, AND CALCIUM CHLORIDE 125 ML/HR: .6; .31; .03; .02 INJECTION, SOLUTION INTRAVENOUS at 07:38

## 2022-09-21 RX ADMIN — CEFAZOLIN SODIUM 1000 MG: 1 SOLUTION INTRAVENOUS at 17:21

## 2022-09-21 RX ADMIN — MIDAZOLAM 2 MG: 1 INJECTION INTRAMUSCULAR; INTRAVENOUS at 08:50

## 2022-09-21 RX ADMIN — OXYCODONE HYDROCHLORIDE 10 MG: 10 TABLET ORAL at 19:26

## 2022-09-21 RX ADMIN — SENNOSIDES 8.6 MG: 8.6 TABLET, FILM COATED ORAL at 14:33

## 2022-09-21 RX ADMIN — ONDANSETRON 4 MG: 2 INJECTION INTRAMUSCULAR; INTRAVENOUS at 10:14

## 2022-09-21 RX ADMIN — CEFAZOLIN SODIUM 2000 MG: 2 SOLUTION INTRAVENOUS at 09:18

## 2022-09-21 RX ADMIN — PROPOFOL 80 MCG/KG/MIN: 10 INJECTION, EMULSION INTRAVENOUS at 09:22

## 2022-09-21 RX ADMIN — DOCUSATE SODIUM 100 MG: 100 CAPSULE, LIQUID FILLED ORAL at 17:22

## 2022-09-21 RX ADMIN — PROPOFOL 30 MG: 10 INJECTION, EMULSION INTRAVENOUS at 09:22

## 2022-09-21 RX ADMIN — SODIUM CHLORIDE, SODIUM LACTATE, POTASSIUM CHLORIDE, AND CALCIUM CHLORIDE: .6; .31; .03; .02 INJECTION, SOLUTION INTRAVENOUS at 10:53

## 2022-09-21 RX ADMIN — SODIUM CHLORIDE, SODIUM LACTATE, POTASSIUM CHLORIDE, AND CALCIUM CHLORIDE: .6; .31; .03; .02 INJECTION, SOLUTION INTRAVENOUS at 10:02

## 2022-09-21 RX ADMIN — FAMOTIDINE 10 MG: 20 TABLET ORAL at 21:39

## 2022-09-21 RX ADMIN — SODIUM CHLORIDE, SODIUM LACTATE, POTASSIUM CHLORIDE, AND CALCIUM CHLORIDE 100 ML/HR: .6; .31; .03; .02 INJECTION, SOLUTION INTRAVENOUS at 14:33

## 2022-09-21 RX ADMIN — LIDOCAINE HYDROCHLORIDE 40 MG: 20 INJECTION INTRAVENOUS at 09:22

## 2022-09-21 RX ADMIN — ACETAMINOPHEN 650 MG: 325 TABLET ORAL at 17:21

## 2022-09-21 RX ADMIN — DEXAMETHASONE SODIUM PHOSPHATE 5 MG: 10 INJECTION, SOLUTION INTRAMUSCULAR; INTRAVENOUS at 09:30

## 2022-09-21 RX ADMIN — ROPIVACAINE HYDROCHLORIDE 30 ML: 5 INJECTION, SOLUTION EPIDURAL; INFILTRATION; PERINEURAL at 08:50

## 2022-09-21 RX ADMIN — BUPIVACAINE HYDROCHLORIDE IN DEXTROSE 1.5 ML: 7.5 INJECTION, SOLUTION SUBARACHNOID at 09:18

## 2022-09-21 RX ADMIN — OXYCODONE 5 MG: 5 TABLET ORAL at 14:33

## 2022-09-21 NOTE — OP NOTE
Left Total Knee Procedure Note    Patient Name: Lavonne Kessler  MRN: [id number]  Date of Surgery 9/21/2022    Surgeon: Cuco Linares MD  Assistant: Judith Falcon [facility]    Indications: Degenerative joint disease left knee with failed conservative care  Pre-operative Diagnosis: Left knee degenerative joint disease  Post-operative Diagnosis: Left knee degenerative joint disease  Anesthesia:  Choice  Operative procedure: Left total knee arthroplasty    Implants:   Implant Name Type Inv  Item Serial No   Lot No  LRB No  Used Action   CEMENT BONE SMART SET GRAY MED VISC - [id number]  CEMENT BONE SMART SET GRAY MED VISC  DEPUY [id number] Left 1 Implanted   COMPONENT FEM SZ 6 LT  CMNT PS ATTUNE - [id number]  COMPONENT FEM SZ 6 LT  CMNT PS ATTUNE  DEPUY [id number] Left 1 Implanted   BASEPLATE TIBIAL SZ 5 CMNT ROTPLT ATTUNE KNEE SYSTM - [id number]  BASEPLATE TIBIAL SZ 5 CMNT ROTPLT ATTUNE KNEE SYSTM  DEPUY [id number] Left 1 Implanted   COMPONENT PATELLAR 32MM CMNT ATTUNE - [id number]  COMPONENT PATELLAR 32MM CMNT ATTUNE  DEPUY [id number] Left 1 Implanted   INSERT TIBIAL 6MM SZ 6 ROTPLT PS ATTUNE - [id number]  INSERT TIBIAL 6MM SZ 6 ROTPLT PS ATTUNE  DEPUY [id number] Left 1 Implanted       Tourniquet time: 30 minutes at 250 mmHg    Drains: None    Estimated blood loss: 50 cc    Antibiotics: Given preoperatively    Clinical note: Lavonne Kessler is a 71 y o  female who presents with severe left knee pain and disability  Physical exam investigations was consistent with degenerative joint disease  The patient been treated nonoperatively and failed to improve  Nonoperative versus operative options reviewed  The patient did understand the situation and did wish to proceed with operative management    Description of procedure: The patient was identified as Lavonne Kessler and the left knee as the surgical site  Anesthesia was administered    The patient's left lower extremity was elevated and tourniquet applied to the proximal thigh  The left lower extremity was then prepped and free draped in usual fashion for knee surgery  Utilizing an Esmarch bandage, the left lower extremity was stripped followed by inflation of tourniquet  A medial parapatellar approach was made and sharp dissection was carried down through skin and subcutaneous tissue  A medial parapatellar arthrotomy was performed the patella was everted and the knee was flexed  End-stage degenerative changes within the left knee were identified  The cruciate ligament were divided  Utilizing an external tibial cutting guide, the tibial cut was made  The menisci were removed  The femoral cuts were then made utilizing the intramedullary guide system and appropriate cutting jigs  Flexion and extension gaps were found to be satisfactory and the tibia was then machined to accept the tibial component  A trial reduction was carried out and the knee was found to be stable and went through satisfactory range of motion  The patella was cut with freehand technique and appropriately sized followed by placement of a trial component  The patella was noted to track normally and all trial components removed  The knee was then copiously irrigated with saline solution followed by cementing a final components in place starting with the tibia followed by femoral component  The tibial bearing was inserted and knee was held in extension followed by cementing of the patellar component holding it in place with a patellar clamp  Excess cement was removed  The knee was held in extension until the cement cured  The knee was then checked for range of motion and found to be excellent with excellent stability  The tourniquet was released and hemostasis was obtained  The incision was then closed in layers utilizing #1 Vicryl and Stratafix for deep layers, 2-0 Vicryl for subcutaneous closure and a 3-0 Monocryl subcuticular stitch for skin  Steri-Strips were applied  A soft absorbent bandage and Ace wrap was added  The patient be transferred to a stretcher in the supine position and taken to recovery  There were no complications  Throughout the procedure, assistance by Terrence Max PA-C was required  She was required to aid in positioning the patient preoperatively and intraoperatively she was required to manipulate the patient's left lower extremity as well as various retractors and other equipment under my guidance  On completion of procedure, she was required to aid in closure of the wound and application of bandage  She was also required to aid in transfer the patient to recovery  AP and lateral views of the left knee were obtained in recovery  This demonstrated appropriate positioning total knee arthroplasty components  There was no evidence loosening or failure  There was air in the soft tissues consistent with immediate postoperative status the radiographs         Astrid Cotto MD      Date: 9/21/2022  Time: 10:56 AM

## 2022-09-21 NOTE — PHYSICAL THERAPY NOTE
PHYSICAL THERAPY EVALUATION          Patient Name: Jena Walker  QWAVU'Y Date: 9/21/2022  PT EVALUATION    71 y o     [id number]    Primary osteoarthritis of left knee [M17 12]    Past Medical History:   Diagnosis Date    Anxiety     BiPAP (biphasic positive airway pressure) dependence     Breast cyst     Left    Chronic kidney disease     Depression     Elevated liver enzymes     GERD (gastroesophageal reflux disease)     Hepatitis B 1974    Hyperlipidemia     Infectious viral hepatitis     Hep B 1974    Sleep apnea     Uses bipap     Past Surgical History:   Procedure Laterality Date    BREAST BIOPSY Right 08/06/2021    intraductal papiloma    BREAST LUMPECTOMY Right 09/21/2021    Procedure: HALEY  DIRECTED LUMPECTOMY;  Surgeon: Carley Caceres MD;  Location: [facility];  Service: Surgical Oncology    COLONOSCOPY      HAND LIGAMENT RECONSTRUCTION Right     MRI BREAST BIOPSY RIGHT (ALL INCLUSIVE) Right 08/06/2021     BREAST HALEY  NEEDLE LOC RIGHT Right 09/13/2021 09/21/22 1612   PT Last Visit   PT Visit Date 09/21/22   Note Type   Note type Evaluation   Pain Assessment   Pain Assessment Tool 0-10   Pain Score 4   Pain Location/Orientation Orientation: Left; Location: Knee   Effect of Pain on Daily Activities 5/10 post amb   Hospital Pain Intervention(s) Repositioned;Cold applied; Ambulation/increased activity; Emotional support; Rest   Restrictions/Precautions   Weight Bearing Precautions Per Order Yes   LLE Weight Bearing Per Order WBAT   Other Precautions Multiple lines; Fall Risk;Pain   Home Living   Type of [facility] One level   Bathroom Shower/Tub Walk-in shower  (also has tub shower)   Bathroom Toilet Standard   Bathroom Equipment Commode  (currently over toilet)   Additional Comments no steps   Prior Function   Level of [place] Independent with ADLs and functional mobility   Lives With Spouse   ADL Assistance Independent   IADLs Independent Falls in the last 6 months 0   Vocational Full time employment  (RN)   Comments indep at baseline without an AD  +  spouse retired and will be home to assist prn  currently on short term disability  General   Additional Pertinent History POD#0 L TKR  up w assist orders and wbat RLE per ortho  PMHx significant for obesity, anxiety, CKD, depression, Hep B   Cognition   Overall Cognitive Status WFL   Arousal/Participation Cooperative   Orientation Level Oriented X4   Memory Within functional limits   Following Commands Follows all commands and directions without difficulty   RLE Assessment   RLE Assessment WFL   LLE Assessment   LLE Assessment WFL  (3- knee ext)   Coordination   Sensation WFL   Bed Mobility   Supine to Sit 5  Supervision   Additional items Bedrails;HOB elevated; Increased time required   Sit to Supine 4  Minimal assistance   Additional items Assist x 1; Increased time required;LE management   Transfers   Sit to Stand 5  Supervision   Additional items Increased time required; Bedrails; Other;Verbal cues  (RW)   Stand to Sit 5  Supervision   Additional items Increased time required;Verbal cues; Other  (RW)   Additional Comments cues for hand placement, position of LLE for comfort   Ambulation/Elevation   Gait pattern Improper Weight shift; Excessively slow   Gait Assistance 5  Supervision   Additional items Assist x 1;Verbal cues   Assistive Device Rolling walker   Distance 40'   Balance   Static Standing Fair   Dynamic Standing Fair -   Ambulatory Fair -   Endurance Deficit   Endurance Deficit No  (vitals pre amb 142/89, 93  post amb 11/75, 96)   Activity Tolerance   Activity Tolerance Patient tolerated treatment well;Patient limited by pain   Nurse [address] RN   Assessment   Prognosis Good   Problem List Decreased strength;Decreased range of motion; Impaired balance;Decreased mobility;Obesity; Decreased skin integrity;Pain   Assessment Jena Walker is a 71 y o  female admitted to [facility] on 9/21/2022 for Primary osteoarthritis of left knee  POD#0 L TKR  PT was consulted and pt was seen on 9/21/2022 for mobility assessment and d/c planning  Pt presents w medium fall risk, multiple lines, acute post op pain  At baseline is indep for ADLs, IADLs and ambulation  Does low intensity HIIT training for exercise  Pt is currently functioning at a min A for bed mobility to manage LLE, supervision for transfers and ambulation w RW limited household distances  Pt demonstrated good follow through of verbal cues for technique  Pt will benefit from continued skilled IP PT to address the above mentioned impairments  in order to maximize recovery and increase functional independence when completing mobility and ADLs  Currently PT recommendations for DME include RW  At this time PT recommendations for d/c are home w OPPT  Barriers to Discharge None   Goals   Patient Goals get back to the gym   STG Expiration Date 09/24/22   Short Term Goal #1 1)  Pt will perform bed mobility with Pati demonstrating appropriate technique 100% of the time in order to improve function  2)  Perform all transfers with Pati demonstrating safe and appropriate technique 100% of the time in order to improve ability to negotiate safely in home environment  3) Amb with least restrictive AD > 150'x1 with mod I in order to demonstrate ability to negotiate in home environment  4)  Improve overall strength and balance 1/2 grade in order to optimize ability to perform functional tasks and reduce fall risk  5) Increase activity tolerance to 45 minutes in order to improve endurance to functional tasks  6) PT for ongoing patient and family/caregiver education, DME needs and d/c planning in order to promote highest level of function in least restrictive environment  Plan   Treatment/Interventions Functional transfer training;LE strengthening/ROM; Therapeutic exercise;Patient/family training;Equipment eval/education; Bed mobility;Gait training; Compensatory technique education;Continued evaluation;Spoke to nursing   PT Frequency Twice a day   Recommendation   PT Discharge Recommendation Home with outpatient rehabilitation   Equipment Recommended [facility] Recommended Wheeled walker   Change/add to [place]? No   AM-PAC Basic Mobility Inpatient   Turning in Bed Without Bedrails 3   Lying on Back to Sitting on Edge of Flat Bed 3   Moving Bed to Chair 3   Standing Up From Chair 3   Walk in Room 3   Climb 3-5 Stairs 3   Basic Mobility Inpatient Raw Score 18   Basic Mobility Standardized Score 41 05   Highest Level Of Mobility   JH-HLM Goal 6: Walk 10 steps or more   JH-HLM Achieved 7: Walk 25 feet or more   End of Consult   Patient Position at End of Consult Supine; All needs within reach   History: co - morbidities, fall risk, multiple lines  Exam: impairments in systems including musculoskeletal (strength), neuromuscular (balance, gait, transfers, motor function and sensation), am-pac, cardiopulmonary, cognition  Clinical: unstable/unpredictable  Complexity:high      Lis Jain, PT

## 2022-09-21 NOTE — ANESTHESIA PREPROCEDURE EVALUATION
Procedure:  TOTAL KNEE REPLACEMENT (Left Knee)    Relevant Problems   MUSCULOSKELETAL   (+) Primary osteoarthritis of left knee      NEURO/PSYCH   (+) Anxiety   (+) Depression      PULMONARY   (+) Complex sleep apnea syndrome   (+) Sleep apnea treated with nocturnal BiPAP      Other   (+) Intraductal papilloma of breast, right   (+) Obesity, Class I, BMI 30-34 9             Anesthesia Plan  ASA Score- 2     Anesthesia Type- spinal with ASA Monitors  Additional Monitors:   Airway Plan:     Comment: GFR=46 at PAT  Adductor canal block--for postop pain control-- requested by surgeon, and discussed with patient preop          Plan Factors-    Chart reviewed  Patient summary reviewed  Patient is not a current smoker  Patient instructed to abstain from smoking on day of procedure  Patient did not smoke on day of surgery  Induction- intravenous  Postoperative Plan-     Informed Consent- Anesthetic plan and risks discussed with patient and spouse Yamilka Durant

## 2022-09-21 NOTE — PLAN OF CARE
Problem: MOBILITY - ADULT  Goal: Maintain or return to baseline ADL function  Description: INTERVENTIONS:  -  Assess patient's ability to carry out ADLs; assess patient's baseline for ADL function and identify physical deficits which impact ability to perform ADLs (bathing, care of mouth/teeth, toileting, grooming, dressing, etc )  - Assess/evaluate cause of self-care deficits   - Assess range of motion  - Assess patient's mobility; develop plan if impaired  - Assess patient's need for assistive devices and provide as appropriate  - Encourage maximum independence but intervene and supervise when necessary  - Involve family in performance of ADLs  - Assess for home care needs following discharge   - Consider OT consult to assist with ADL evaluation and planning for discharge  - Provide patient education as appropriate  Outcome: Progressing  Goal: Maintains/Returns to pre admission functional level  Description: INTERVENTIONS:  - Perform BMAT or MOVE assessment daily    - Set and communicate daily mobility goal to care team and patient/family/caregiver  - Collaborate with rehabilitation services on mobility goals if consulted  - Perform Range of Motion 3 times a day  - Reposition patient every 2 hours    - Dangle patient 3 times a day  - Stand patient 3 times a day  - Ambulate patient 3 times a day  - Out of bed to chair 3 times a day   - Out of bed for meals 3 times a day  - Out of bed for toileting  - Record patient progress and toleration of activity level   Outcome: Progressing     Problem: PAIN - ADULT  Goal: Verbalizes/displays adequate comfort level or baseline comfort level  Description: Interventions:  - Encourage patient to monitor pain and request assistance  - Assess pain using appropriate pain scale  - Administer analgesics based on type and severity of pain and evaluate response  - Implement non-pharmacological measures as appropriate and evaluate response  - Consider cultural and social influences on pain and pain management  - Notify physician/advanced practitioner if interventions unsuccessful or patient reports new pain  Outcome: Progressing     Problem: INFECTION - ADULT  Goal: Absence or prevention of progression during hospitalization  Description: INTERVENTIONS:  - Assess and monitor for signs and symptoms of infection  - Monitor lab/diagnostic results  - Monitor all insertion sites, i e  indwelling lines, tubes, and drains  - Monitor endotracheal if appropriate and nasal secretions for changes in amount and color  - [place] appropriate cooling/warming therapies per order  - Administer medications as ordered  - Instruct and encourage patient and family to use good hand hygiene technique  - Identify and instruct in appropriate isolation precautions for identified infection/condition  Outcome: Progressing  Goal: Absence of fever/infection during neutropenic period  Description: INTERVENTIONS:  - Monitor WBC    Outcome: Progressing     Problem: SAFETY ADULT  Goal: Maintain or return to baseline ADL function  Description: INTERVENTIONS:  -  Assess patient's ability to carry out ADLs; assess patient's baseline for ADL function and identify physical deficits which impact ability to perform ADLs (bathing, care of mouth/teeth, toileting, grooming, dressing, etc )  - Assess/evaluate cause of self-care deficits   - Assess range of motion  - Assess patient's mobility; develop plan if impaired  - Assess patient's need for assistive devices and provide as appropriate  - Encourage maximum independence but intervene and supervise when necessary  - Involve family in performance of ADLs  - Assess for home care needs following discharge   - Consider OT consult to assist with ADL evaluation and planning for discharge  - Provide patient education as appropriate  Outcome: Progressing  Goal: Maintains/Returns to pre admission functional level  Description: INTERVENTIONS:  - Perform BMAT or MOVE assessment daily    - Set and communicate daily mobility goal to care team and patient/family/caregiver  - Collaborate with rehabilitation services on mobility goals if consulted  - Perform Range of Motion 3 times a day  - Reposition patient every 2 hours    - Dangle patient 3 times a day  - Stand patient 3 times a day  - Ambulate patient 3 times a day  - Out of bed to chair 3 times a day   - Out of bed for meals 3 times a day  - Out of bed for toileting  - Record patient progress and toleration of activity level   Outcome: Progressing  Goal: Patient will remain free of falls  Description: INTERVENTIONS:  - Educate patient/family on patient safety including physical limitations  - Instruct patient to call for assistance with activity   - Consult OT/PT to assist with strengthening/mobility   - Keep Call bell within reach  - Keep bed low and locked with side rails adjusted as appropriate  - Keep care items and personal belongings within reach  - Initiate and maintain comfort rounds  - Make Fall Risk Sign visible to staff  - Offer Toileting every 2 Hours, in advance of need  - Initiate/Maintain Bed alarm  - Apply yellow socks and bracelet for high fall risk patients  - Consider moving patient to room near nurses station  Outcome: Progressing     Problem: DISCHARGE PLANNING  Goal: Discharge to home or other facility with appropriate resources  Description: INTERVENTIONS:  - Identify barriers to discharge w/patient and caregiver  - Arrange for needed discharge resources and transportation as appropriate  - Identify discharge learning needs (meds, wound care, etc )  - Arrange for interpretive services to assist at discharge as needed  - Refer to Case Management Department for coordinating discharge planning if the patient needs post-hospital services based on physician/advanced practitioner order or complex needs related to functional status, cognitive ability, or social support system  Outcome: Progressing     Problem: Knowledge Deficit  Goal: Patient/family/caregiver demonstrates understanding of disease process, treatment plan, medications, and discharge instructions  Description: Complete learning assessment and assess knowledge base    Interventions:  - Provide teaching at level of understanding  - Provide teaching via preferred learning methods  Outcome: Progressing

## 2022-09-21 NOTE — ANESTHESIA PROCEDURE NOTES
Spinal Block    Patient location during procedure: OR  Start time: 9/21/2022 9:18 AM  Reason for block: procedure for pain and primary anesthetic  Staffing  Performed: CRNA and Other anesthesia staff   Resident/CRNA: Kartik Calvillo CRNA  Preanesthetic Checklist  Completed: patient identified, IV checked, site marked, risks and benefits discussed, surgical consent, monitors and equipment checked, pre-op evaluation and timeout performed  Spinal Block  Patient position: sitting  Prep: Betadine and site prepped and draped  Patient monitoring: heart rate, continuous pulse ox and frequent blood pressure checks  Approach: midline  Location: L3-4  Injection technique: single-shot  Needle  Needle type: pencil-tip   Needle gauge: 24 G  Assessment  Sensory level: T10  Injection Assessment:  negative aspiration for heme, no paresthesia on injection and positive aspiration for clear CSF  Additional Notes  Spinal done by Luna Dance using sterile technique   Pt tolerated well

## 2022-09-21 NOTE — ANESTHESIA POSTPROCEDURE EVALUATION
Post-Op Assessment Note    CV Status:  Stable  Pain Score: 1    Pain management: adequate     Mental Status:  Alert and awake   Hydration Status:  Euvolemic   PONV Controlled:  Controlled   Airway Patency:  Patent      Post Op Vitals Reviewed: Yes      Staff: Anesthesiologist         No complications documented      /75 (09/21/22 1136)    Temp      Pulse 76 (09/21/22 1136)   Resp 14 (09/21/22 1136)    SpO2 97 % (09/21/22 1136)

## 2022-09-21 NOTE — PLAN OF CARE
Problem: PHYSICAL THERAPY ADULT  Goal: Performs mobility at highest level of function for planned discharge setting  See evaluation for individualized goals  Description: Treatment/Interventions: Functional transfer training, LE strengthening/ROM, Therapeutic exercise, Patient/family training, Equipment eval/education, Bed mobility, Gait training, Compensatory technique education, Continued evaluation, Spoke to nursing  Equipment Recommended: Santos Gregory       See flowsheet documentation for full assessment, interventions and recommendations  Note: Prognosis: Good  Problem List: Decreased strength, Decreased range of motion, Impaired balance, Decreased mobility, Obesity, Decreased skin integrity, Pain  Assessment: Valerie Goodman is a 71 y o  female admitted to [facility] on 9/21/2022 for Primary osteoarthritis of left knee  POD#0 L TKR  PT was consulted and pt was seen on 9/21/2022 for mobility assessment and d/c planning  Pt presents w medium fall risk, multiple lines, acute post op pain  At baseline is indep for ADLs, IADLs and ambulation  Does low intensity HIIT training for exercise  Pt is currently functioning at a min A for bed mobility to manage LLE, supervision for transfers and ambulation w RW limited household distances  Pt demonstrated good follow through of verbal cues for technique  Pt will benefit from continued skilled IP PT to address the above mentioned impairments  in order to maximize recovery and increase functional independence when completing mobility and ADLs  Currently PT recommendations for DME include RW  At this time PT recommendations for d/c are home w OPPT  Barriers to Discharge: None     PT Discharge Recommendation: Home with outpatient rehabilitation    See flowsheet documentation for full assessment

## 2022-09-21 NOTE — INTERVAL H&P NOTE
H&P reviewed  After examining the patient I find no changes in the patients condition since the H&P had been written      Vitals:    09/21/22 0632   BP: 137/78   Pulse: 86   Resp: 16   Temp: 98 °F (36 7 °C)   SpO2: 95%

## 2022-09-21 NOTE — ANESTHESIA PROCEDURE NOTES
Peripheral Block    Patient location during procedure: holding area  Start time: 9/21/2022 8:40 AM  Reason for block: at surgeon's request and post-op pain management  Staffing  Performed: Anesthesiologist   Anesthesiologist: Hansel Mora DO  Preanesthetic Checklist  Completed: patient identified, IV checked, site marked, risks and benefits discussed, surgical consent, monitors and equipment checked, pre-op evaluation and timeout performed  Peripheral Block  Patient position: sitting  Prep: Betadine  Patient monitoring: heart rate, cardiac monitor, continuous pulse ox and frequent blood pressure checks  Block type: adductor canal block  Laterality: left  Injection technique: single-shot  Ultrasound permanent image savedropivacaine (NAROPIN) 0 5 % - Perineural   30 mL - 9/21/2022 8:50:00 AM  midazolam (VERSED) 2 mg/2 mL - Intravenous   2 mg - 9/21/2022 8:50:00 AM  fentaNYL 50 mcg/mL - Intravenous   100 mcg - 9/21/2022 8:50:00 AM  Needle  Needle type: Stimuplex   Needle gauge: 21 G  Needle length: 10 cm  Needle localization: ultrasound guidance  Test dose: negative  Assessment  Injection assessment: incremental injection  Paresthesia pain: none  Heart rate change: no  Slow fractionated injection: yes  patient tolerated the procedure well with no immediate complications

## 2022-09-22 ENCOUNTER — EPISODE CHANGES (OUTPATIENT)
Dept: CASE MANAGEMENT | Facility: OTHER | Age: 72
End: 2022-09-22

## 2022-09-22 VITALS
TEMPERATURE: 98.2 F | RESPIRATION RATE: 18 BRPM | WEIGHT: 211.19 LBS | BODY MASS INDEX: 35.18 KG/M2 | HEART RATE: 88 BPM | HEIGHT: 65 IN | OXYGEN SATURATION: 96 % | SYSTOLIC BLOOD PRESSURE: 109 MMHG | DIASTOLIC BLOOD PRESSURE: 62 MMHG

## 2022-09-22 LAB
ANION GAP SERPL CALCULATED.3IONS-SCNC: 8 MMOL/L (ref 4–13)
BUN SERPL-MCNC: 14 MG/DL (ref 5–25)
CALCIUM SERPL-MCNC: 8.4 MG/DL (ref 8.3–10.1)
CHLORIDE SERPL-SCNC: 106 MMOL/L (ref 96–108)
CO2 SERPL-SCNC: 28 MMOL/L (ref 21–32)
CREAT SERPL-MCNC: 1.03 MG/DL (ref 0.6–1.3)
DME PARACHUTE DELIVERY DATE REQUESTED: NORMAL
DME PARACHUTE ITEM DESCRIPTION: NORMAL
DME PARACHUTE ORDER STATUS: NORMAL
DME PARACHUTE SUPPLIER NAME: NORMAL
DME PARACHUTE SUPPLIER PHONE: NORMAL
ERYTHROCYTE [DISTWIDTH] IN BLOOD BY AUTOMATED COUNT: 14 % (ref 11.6–15.1)
GFR SERPL CREATININE-BSD FRML MDRD: 54 ML/MIN/1.73SQ M
GLUCOSE SERPL-MCNC: 131 MG/DL (ref 65–140)
HCT VFR BLD AUTO: 38 % (ref 34.8–46.1)
HGB BLD-MCNC: 11.8 G/DL (ref 11.5–15.4)
MCH RBC QN AUTO: 28.3 PG (ref 26.8–34.3)
MCHC RBC AUTO-ENTMCNC: 31.1 G/DL (ref 31.4–37.4)
MCV RBC AUTO: 91 FL (ref 82–98)
PLATELET # BLD AUTO: 284 THOUSANDS/UL (ref 149–390)
PMV BLD AUTO: 9.8 FL (ref 8.9–12.7)
POTASSIUM SERPL-SCNC: 4 MMOL/L (ref 3.5–5.3)
RBC # BLD AUTO: 4.17 MILLION/UL (ref 3.81–5.12)
SODIUM SERPL-SCNC: 142 MMOL/L (ref 135–147)
WBC # BLD AUTO: 15.25 THOUSAND/UL (ref 4.31–10.16)

## 2022-09-22 PROCEDURE — 97167 OT EVAL HIGH COMPLEX 60 MIN: CPT

## 2022-09-22 PROCEDURE — 80048 BASIC METABOLIC PNL TOTAL CA: CPT | Performed by: PHYSICIAN ASSISTANT

## 2022-09-22 PROCEDURE — 97110 THERAPEUTIC EXERCISES: CPT

## 2022-09-22 PROCEDURE — 85027 COMPLETE CBC AUTOMATED: CPT | Performed by: ORTHOPAEDIC SURGERY

## 2022-09-22 RX ORDER — OXYCODONE HYDROCHLORIDE 5 MG/1
5 TABLET ORAL EVERY 4 HOURS PRN
Refills: 0
Start: 2022-09-22 | End: 2022-10-02

## 2022-09-22 RX ORDER — ASPIRIN 325 MG
325 TABLET ORAL 2 TIMES DAILY
Refills: 0
Start: 2022-09-22

## 2022-09-22 RX ORDER — DOCUSATE SODIUM 100 MG/1
100 CAPSULE, LIQUID FILLED ORAL 2 TIMES DAILY
Refills: 0
Start: 2022-09-22

## 2022-09-22 RX ORDER — FERROUS SULFATE 325(65) MG
325 TABLET ORAL
Refills: 0
Start: 2022-09-22

## 2022-09-22 RX ORDER — ACETAMINOPHEN 325 MG/1
650 TABLET ORAL EVERY 6 HOURS PRN
Refills: 0
Start: 2022-09-22

## 2022-09-22 RX ADMIN — DULOXETINE HYDROCHLORIDE 60 MG: 30 CAPSULE, DELAYED RELEASE ORAL at 09:04

## 2022-09-22 RX ADMIN — OXYCODONE HYDROCHLORIDE 10 MG: 10 TABLET ORAL at 10:20

## 2022-09-22 RX ADMIN — Medication 1000 UNITS: at 09:04

## 2022-09-22 RX ADMIN — CEFAZOLIN SODIUM 1000 MG: 1 SOLUTION INTRAVENOUS at 00:40

## 2022-09-22 RX ADMIN — SENNOSIDES 8.6 MG: 8.6 TABLET, FILM COATED ORAL at 09:04

## 2022-09-22 RX ADMIN — ASPIRIN 325 MG ORAL TABLET 325 MG: 325 PILL ORAL at 09:03

## 2022-09-22 RX ADMIN — ACETAMINOPHEN 650 MG: 325 TABLET ORAL at 09:05

## 2022-09-22 RX ADMIN — ARIPIPRAZOLE 10 MG: 10 TABLET ORAL at 09:04

## 2022-09-22 RX ADMIN — OXYCODONE HYDROCHLORIDE 10 MG: 10 TABLET ORAL at 06:17

## 2022-09-22 RX ADMIN — OXYCODONE HYDROCHLORIDE 10 MG: 10 TABLET ORAL at 00:40

## 2022-09-22 RX ADMIN — FERROUS SULFATE TAB 325 MG (65 MG ELEMENTAL FE) 325 MG: 325 (65 FE) TAB at 09:04

## 2022-09-22 RX ADMIN — FAMOTIDINE 10 MG: 20 TABLET ORAL at 09:04

## 2022-09-22 RX ADMIN — BUPROPION HYDROCHLORIDE 150 MG: 150 TABLET, FILM COATED, EXTENDED RELEASE ORAL at 09:04

## 2022-09-22 RX ADMIN — SODIUM CHLORIDE, SODIUM LACTATE, POTASSIUM CHLORIDE, AND CALCIUM CHLORIDE 100 ML/HR: .6; .31; .03; .02 INJECTION, SOLUTION INTRAVENOUS at 00:40

## 2022-09-22 RX ADMIN — DOCUSATE SODIUM 100 MG: 100 CAPSULE, LIQUID FILLED ORAL at 09:04

## 2022-09-22 NOTE — PLAN OF CARE
Problem: MOBILITY - ADULT  Goal: Maintain or return to baseline ADL function  Description: INTERVENTIONS:  -  Assess patient's ability to carry out ADLs; assess patient's baseline for ADL function and identify physical deficits which impact ability to perform ADLs (bathing, care of mouth/teeth, toileting, grooming, dressing, etc )  - Assess/evaluate cause of self-care deficits   - Assess range of motion  - Assess patient's mobility; develop plan if impaired  - Assess patient's need for assistive devices and provide as appropriate  - Encourage maximum independence but intervene and supervise when necessary  - Involve family in performance of ADLs  - Assess for home care needs following discharge   - Consider OT consult to assist with ADL evaluation and planning for discharge  - Provide patient education as appropriate  Outcome: Progressing  Goal: Maintains/Returns to pre admission functional level  Description: INTERVENTIONS:  - Perform BMAT or MOVE assessment daily    - Set and communicate daily mobility goal to care team and patient/family/caregiver  - Collaborate with rehabilitation services on mobility goals if consulted  - Perform Range of Motion 3 times a day  - Reposition patient every 2 hours    - Dangle patient 3 times a day  - Stand patient 3 times a day  - Ambulate patient 3 times a day  - Out of bed to chair 3 times a day   - Out of bed for meals 3 times a day  - Out of bed for toileting  - Record patient progress and toleration of activity level   Outcome: Progressing     Problem: PAIN - ADULT  Goal: Verbalizes/displays adequate comfort level or baseline comfort level  Description: Interventions:  - Encourage patient to monitor pain and request assistance  - Assess pain using appropriate pain scale  - Administer analgesics based on type and severity of pain and evaluate response  - Implement non-pharmacological measures as appropriate and evaluate response  - Consider cultural and social influences on pain and pain management  - Notify physician/advanced practitioner if interventions unsuccessful or patient reports new pain  Outcome: Progressing     Problem: INFECTION - ADULT  Goal: Absence or prevention of progression during hospitalization  Description: INTERVENTIONS:  - Assess and monitor for signs and symptoms of infection  - Monitor lab/diagnostic results  - Monitor all insertion sites, i e  indwelling lines, tubes, and drains  - Monitor endotracheal if appropriate and nasal secretions for changes in amount and color  - [place] appropriate cooling/warming therapies per order  - Administer medications as ordered  - Instruct and encourage patient and family to use good hand hygiene technique  - Identify and instruct in appropriate isolation precautions for identified infection/condition  Outcome: Progressing     Problem: SAFETY ADULT  Goal: Maintain or return to baseline ADL function  Description: INTERVENTIONS:  -  Assess patient's ability to carry out ADLs; assess patient's baseline for ADL function and identify physical deficits which impact ability to perform ADLs (bathing, care of mouth/teeth, toileting, grooming, dressing, etc )  - Assess/evaluate cause of self-care deficits   - Assess range of motion  - Assess patient's mobility; develop plan if impaired  - Assess patient's need for assistive devices and provide as appropriate  - Encourage maximum independence but intervene and supervise when necessary  - Involve family in performance of ADLs  - Assess for home care needs following discharge   - Consider OT consult to assist with ADL evaluation and planning for discharge  - Provide patient education as appropriate  Outcome: Progressing  Goal: Maintains/Returns to pre admission functional level  Description: INTERVENTIONS:  - Perform BMAT or MOVE assessment daily    - Set and communicate daily mobility goal to care team and patient/family/caregiver     - Collaborate with rehabilitation services on mobility goals if consulted  - Perform Range of Motion 3 times a day  - Reposition patient every 2 hours    - Dangle patient 3 times a day  - Stand patient 3 times a day  - Ambulate patient 3 times a day  - Out of bed to chair 3 times a day   - Out of bed for meals 3 times a day  - Out of bed for toileting  - Record patient progress and toleration of activity level   Outcome: Progressing  Goal: Patient will remain free of falls  Description: INTERVENTIONS:  - Educate patient/family on patient safety including physical limitations  - Instruct patient to call for assistance with activity   - Consult OT/PT to assist with strengthening/mobility   - Keep Call bell within reach  - Keep bed low and locked with side rails adjusted as appropriate  - Keep care items and personal belongings within reach  - Initiate and maintain comfort rounds  - Make Fall Risk Sign visible to staff  - Offer Toileting every 2 Hours, in advance of need  - Initiate/Maintain Bed alarm  - Apply yellow socks and bracelet for high fall risk patients  - Consider moving patient to room near nurses station  Outcome: Progressing     Problem: DISCHARGE PLANNING  Goal: Discharge to home or other facility with appropriate resources  Description: INTERVENTIONS:  - Identify barriers to discharge w/patient and caregiver  - Arrange for needed discharge resources and transportation as appropriate  - Identify discharge learning needs (meds, wound care, etc )  - Arrange for interpretive services to assist at discharge as needed  - Refer to Case Management Department for coordinating discharge planning if the patient needs post-hospital services based on physician/advanced practitioner order or complex needs related to functional status, cognitive ability, or social support system  Outcome: Progressing     Problem: Knowledge Deficit  Goal: Patient/family/caregiver demonstrates understanding of disease process, treatment plan, medications, and discharge instructions  Description: Complete learning assessment and assess knowledge base    Interventions:  - Provide teaching at level of understanding  - Provide teaching via preferred learning methods  Outcome: Progressing     Problem: Potential for Falls  Goal: Patient will remain free of falls  Description: INTERVENTIONS:  - Educate patient/family on patient safety including physical limitations  - Instruct patient to call for assistance with activity   - Consult OT/PT to assist with strengthening/mobility   - Keep Call bell within reach  - Keep bed low and locked with side rails adjusted as appropriate  - Keep care items and personal belongings within reach  - Initiate and maintain comfort rounds  - Make Fall Risk Sign visible to staff  - Offer Toileting every 2 Hours, in advance of need  - Initiate/Maintain Bed alarm  - Apply yellow socks and bracelet for high fall risk patients  - Consider moving patient to room near nurses station  Outcome: Progressing     Problem: RESPIRATORY - ADULT  Goal: Achieves optimal ventilation and oxygenation  Description: INTERVENTIONS:  - Assess for changes in respiratory status  - Assess for changes in mentation and behavior  - Position to facilitate oxygenation and minimize respiratory effort  - Oxygen administered by appropriate delivery if ordered  - Initiate smoking cessation education as indicated  - Encourage broncho-pulmonary hygiene including cough, deep breathe, Incentive Spirometry  - Assess the need for suctioning and aspirate as needed  - Assess and instruct to report SOB or any respiratory difficulty  - Respiratory Therapy support as indicated  Outcome: Progressing     Problem: GENITOURINARY - ADULT  Goal: Absence of urinary retention  Description: INTERVENTIONS:  - Assess patients ability to void and empty bladder  - Monitor I/O  - Bladder scan as needed  - Discuss with physician/AP medications to alleviate retention as needed  - Discuss catheterization for long term situations as appropriate  Outcome: Progressing  Goal: Urinary catheter remains patent  Description: INTERVENTIONS:  - Assess patency of urinary catheter  - If patient has a chronic rubio, consider changing catheter if non-functioning  - Follow guidelines for intermittent irrigation of non-functioning urinary catheter  Outcome: Progressing

## 2022-09-22 NOTE — PLAN OF CARE
Problem: MOBILITY - ADULT  Goal: Maintain or return to baseline ADL function  Description: INTERVENTIONS:  -  Assess patient's ability to carry out ADLs; assess patient's baseline for ADL function and identify physical deficits which impact ability to perform ADLs (bathing, care of mouth/teeth, toileting, grooming, dressing, etc )  - Assess/evaluate cause of self-care deficits   - Assess range of motion  - Assess patient's mobility; develop plan if impaired  - Assess patient's need for assistive devices and provide as appropriate  - Encourage maximum independence but intervene and supervise when necessary  - Involve family in performance of ADLs  - Assess for home care needs following discharge   - Consider OT consult to assist with ADL evaluation and planning for discharge  - Provide patient education as appropriate  Outcome: Progressing  Goal: Maintains/Returns to pre admission functional level  Description: INTERVENTIONS:  - Perform BMAT or MOVE assessment daily    - Set and communicate daily mobility goal to care team and patient/family/caregiver     - Collaborate with rehabilitation services on mobility goals if consulted  - Out of bed for toileting  - Record patient progress and toleration of activity level   Outcome: Progressing     Problem: PAIN - ADULT  Goal: Verbalizes/displays adequate comfort level or baseline comfort level  Description: Interventions:  - Encourage patient to monitor pain and request assistance  - Assess pain using appropriate pain scale  - Administer analgesics based on type and severity of pain and evaluate response  - Implement non-pharmacological measures as appropriate and evaluate response  - Consider cultural and social influences on pain and pain management  - Notify physician/advanced practitioner if interventions unsuccessful or patient reports new pain  Outcome: Progressing     Problem: INFECTION - ADULT  Goal: Absence or prevention of progression during hospitalization  Description: INTERVENTIONS:  - Assess and monitor for signs and symptoms of infection  - Monitor lab/diagnostic results  - Monitor all insertion sites, i e  indwelling lines, tubes, and drains  - Monitor endotracheal if appropriate and nasal secretions for changes in amount and color  - [place] appropriate cooling/warming therapies per order  - Administer medications as ordered  - Instruct and encourage patient and family to use good hand hygiene technique  - Identify and instruct in appropriate isolation precautions for identified infection/condition  Outcome: Progressing  Goal: Absence of fever/infection during neutropenic period  Description: INTERVENTIONS:  - Monitor WBC    Outcome: Progressing     Problem: SAFETY ADULT  Goal: Maintain or return to baseline ADL function  Description: INTERVENTIONS:  -  Assess patient's ability to carry out ADLs; assess patient's baseline for ADL function and identify physical deficits which impact ability to perform ADLs (bathing, care of mouth/teeth, toileting, grooming, dressing, etc )  - Assess/evaluate cause of self-care deficits   - Assess range of motion  - Assess patient's mobility; develop plan if impaired  - Assess patient's need for assistive devices and provide as appropriate  - Encourage maximum independence but intervene and supervise when necessary  - Involve family in performance of ADLs  - Assess for home care needs following discharge   - Consider OT consult to assist with ADL evaluation and planning for discharge  - Provide patient education as appropriate  Outcome: Progressing  Goal: Maintains/Returns to pre admission functional level  Description: INTERVENTIONS:  - Perform BMAT or MOVE assessment daily    - Set and communicate daily mobility goal to care team and patient/family/caregiver     - Collaborate with rehabilitation services on mobility goals if consulted  - Out of bed for toileting  - Record patient progress and toleration of activity level   Outcome: Progressing  Goal: Patient will remain free of falls  Description: INTERVENTIONS:  - Educate patient/family on patient safety including physical limitations  - Instruct patient to call for assistance with activity   - Consult OT/PT to assist with strengthening/mobility   - Keep Call bell within reach  - Keep bed low and locked with side rails adjusted as appropriate  - Keep care items and personal belongings within reach  - Initiate and maintain comfort rounds  - Make Fall Risk Sign visible to staff  - Apply yellow socks and bracelet for high fall risk patients  - Consider moving patient to room near nurses station  Outcome: Progressing     Problem: DISCHARGE PLANNING  Goal: Discharge to home or other facility with appropriate resources  Description: INTERVENTIONS:  - Identify barriers to discharge w/patient and caregiver  - Arrange for needed discharge resources and transportation as appropriate  - Identify discharge learning needs (meds, wound care, etc )  - Arrange for interpretive services to assist at discharge as needed  - Refer to Case Management Department for coordinating discharge planning if the patient needs post-hospital services based on physician/advanced practitioner order or complex needs related to functional status, cognitive ability, or social support system  Outcome: Progressing     Problem: Knowledge Deficit  Goal: Patient/family/caregiver demonstrates understanding of disease process, treatment plan, medications, and discharge instructions  Description: Complete learning assessment and assess knowledge base    Interventions:  - Provide teaching at level of understanding  - Provide teaching via preferred learning methods  Outcome: Progressing     Problem: Potential for Falls  Goal: Patient will remain free of falls  Description: INTERVENTIONS:  - Educate patient/family on patient safety including physical limitations  - Instruct patient to call for assistance with activity   - Consult OT/PT to assist with strengthening/mobility   - Keep Call bell within reach  - Keep bed low and locked with side rails adjusted as appropriate  - Keep care items and personal belongings within reach  - Initiate and maintain comfort rounds  - Make Fall Risk Sign visible to staff  - Apply yellow socks and bracelet for high fall risk patients  - Consider moving patient to room near nurses station  Outcome: Progressing

## 2022-09-22 NOTE — PHYSICAL THERAPY NOTE
PHYSICAL THERAPY NOTE          Patient Name: Dinora Salter  DVQRW'S Date: 9/22/2022  Time: 1071-5713       09/22/22 0953   PT Last Visit   PT Visit Date 09/22/22   Note Type   Note Type BID visit/treatment   Pain Assessment   Pain Assessment Tool 0-10   Pain Score 8   Pain Location/Orientation Orientation: Left; Location: Knee   Effect of Pain on Daily Activities improved to 6/10 post amb   Hospital Pain Intervention(s) Cold applied;Repositioned; Ambulation/increased activity; Rest;Emotional support   Restrictions/Precautions   LLE Weight Bearing Per Order WBAT   Other Precautions Fall Risk;Pain   General   Chart Reviewed Yes   Additional Pertinent History POD#1 L TKR   Response to Previous Treatment Patient with no complaints from previous session  Cognition   Overall Cognitive Status WFL   Arousal/Participation Cooperative   Subjective   Subjective "I feel a lot better than when I tried to get up this morning"   Transfers   Sit to Stand 5  Supervision   Additional items Armrests; Increased time required   Stand to Sit 5  Supervision   Additional items Armrests; Increased time required   Additional Comments use of AD prn  occ vc to reinforce position of LLE for comfort   Ambulation/Elevation   Gait pattern Improper Weight shift; Excessively slow   Gait Assistance 5  Supervision   Additional items Assist x 1   Assistive Device Rolling walker   Distance 90'   Balance   Static Standing Fair   Dynamic Standing Fair -   Ambulatory Fair -   Endurance Deficit   Endurance Deficit Yes   Endurance Deficit Description ambulating household distances   Activity Tolerance   Activity Tolerance Patient tolerated treatment well;Patient limited by pain   Nurse [address] RN   Exercises   Heelslides Sitting;15 reps;Right   Knee AROM Standing;10 reps; Left  (knee flex w RW for support)   Ankle Pumps Sitting;15 reps; Left   Squat Standing;10 reps  (w RW for support)   TKR   (provided written HEP  verbally reviewed w demonstrations including quad sets and SAQ)   Assessment   Prognosis Good   Problem List Decreased range of motion; Impaired balance;Decreased mobility;Obesity; Decreased skin integrity;Pain   Assessment Lovely Ellis was seen now POD#1 L TKR  Reporting severe pain in L knee start of session that improved to moderate knee pain w repositioning and activity  Continues to function at distant S for transfers and ambulation w RW  Currently ambulating unlimited household distances  Decline ambulation limited community distances due to fatigue  Tolerated seated and standing therex well; written handout provided to supplement HEP  Given progress in therapy will clear pt for d/c home today  Has OPPT scheduled  Will need RW  Barriers to Discharge None   Goals   Patient Goals get back to gym   STG Expiration Date 09/24/22   Short Term Goal #1 1)  Pt will perform bed mobility with Pati demonstrating appropriate technique 100% of the time in order to improve function  2)  Perform all transfers with Pati demonstrating safe and appropriate technique 100% of the time in order to improve ability to negotiate safely in home environment  3) Amb with least restrictive AD > 150'x1 with mod I in order to demonstrate ability to negotiate in home environment  4)  Improve overall strength and balance 1/2 grade in order to optimize ability to perform functional tasks and reduce fall risk  5) Increase activity tolerance to 45 minutes in order to improve endurance to functional tasks  6) PT for ongoing patient and family/caregiver education, DME needs and d/c planning in order to promote highest level of function in least restrictive environment  PT Treatment Day 1   Plan   Treatment/Interventions Functional transfer training;LE strengthening/ROM; Therapeutic exercise; Endurance training;Patient/family training;Equipment eval/education; Bed mobility;Gait training; Compensatory technique education;Spoke to nursing;OT   Progress Progressing toward goals   PT Frequency Twice a day   Recommendation   PT Discharge Recommendation Home with outpatient rehabilitation   Equipment Recommended [facility] Recommended Wheeled walker   Change/add to [employer]? No   AM-PAC Basic Mobility Inpatient   Turning in Bed Without Bedrails 3   Lying on Back to Sitting on Edge of Flat Bed 3   Moving Bed to Chair 3   Standing Up From Chair 3   Walk in Room 3   Climb 3-5 Stairs 3   Basic Mobility Inpatient Raw Score 18   Basic Mobility Standardized Score 41 05   Highest Level Of Mobility   JH-HLM Goal 6: Walk 10 steps or more   JH-HLM Achieved 7: Walk 25 feet or more   Education   Education Provided Mobility training;Home exercise program   Patient Demonstrates acceptance/verbal understanding   End of Consult   Patient Position at End of Consult Bedside chair; All needs within reach   End of Consult Comments OT present       Cuong Matthew, PT

## 2022-09-22 NOTE — OCCUPATIONAL THERAPY NOTE
Occupational Therapy Evaluation(time=0940-1002)     Patient Name: Jacoby Mcdaniel  QLCPO'P Date: 9/22/2022  Problem List  Principal Problem:    Primary osteoarthritis of left knee  Active Problems:    Sleep apnea treated with nocturnal BiPAP    Anxiety    Depression    Past Medical History  Past Medical History:   Diagnosis Date    Anxiety     BiPAP (biphasic positive airway pressure) dependence     Breast cyst     Left    Chronic kidney disease     Depression     Elevated liver enzymes     GERD (gastroesophageal reflux disease)     Hepatitis B 1974    Hyperlipidemia     Infectious viral hepatitis     Hep B 1974    Sleep apnea     Uses bipap     Past Surgical History  Past Surgical History:   Procedure Laterality Date    BREAST BIOPSY Right 08/06/2021    intraductal papiloma    BREAST LUMPECTOMY Right 09/21/2021    Procedure: HALEY  DIRECTED LUMPECTOMY;  Surgeon: Be Joy MD;  Location: [facility];  Service: Surgical Oncology    COLONOSCOPY      HAND LIGAMENT RECONSTRUCTION Right     MRI BREAST BIOPSY RIGHT (ALL INCLUSIVE) Right 08/06/2021    CT TOTAL KNEE ARTHROPLASTY Left 9/21/2022    Procedure: TOTAL KNEE REPLACEMENT;  Surgeon: Maura Chu MD;  Location: [facility];  Service: Orthopedics     BREAST HALEY  NEEDLE LOC RIGHT Right 09/13/2021 09/22/22 1002   Note Type   Note type Evaluation   Restrictions/Precautions   Weight Bearing Precautions Per Order Yes   LLE Weight Bearing Per Order WBAT   Other Precautions Fall Risk;Pain   Pain Assessment   Pain Assessment Tool FLACC   Pain Rating: FLACC (Rest) - Face 0   Pain Rating: FLACC (Rest) - Legs 0   Pain Rating: FLACC (Rest) - Activity 0   Pain Rating: FLACC (Rest) - Cry 0   Pain Rating: FLACC (Rest) - Consolability 0   Score: FLACC (Rest) 0   Home Living   Type of Home Apartment   Home Layout One level   Bathroom Shower/Tub Walk-in shower   Bathroom Toilet Standard   Home Equipment   (denies)   Prior Function   Lives With Spouse ADL Assistance Independent   Lifestyle   Autonomy PTA pt states independence with her ADLs, transfers, ambulation; +   Reciprocal Relationships supportive spouse   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   Intrinsic Gratification "going to the gym "   Psychosocial   Psychosocial (WDL) WDL   Subjective   Subjective "It feels better the more I move around "   ADL   Where Assessed Chair   Eating Assistance 6  Modified independent   Grooming Assistance 6  [address] 5  [address] [facility] 5  Supervision/Setup   LB Dressing Assistance 4  [address]   Additional items Armrests; Increased time required;Verbal cues   Stand to Sit 5  Supervision   Additional items Armrests; Increased time required;Verbal cues   Functional Mobility   Functional Mobility 5  Supervision   Additional items Rolling walker   Balance   Static Sitting Good   Dynamic Sitting Fair +   Static Standing Fair   Dynamic Standing Fair -   Activity Tolerance   Activity Tolerance Patient tolerated treatment well   Medical Staff Made Aware nsg, CM   RUE Assessment   RUE Assessment WFL   RUE Strength   RUE Overall Strength Within Functional Limits - strength 5/5   LUE Assessment   LUE Assessment WFL   LUE Strength   LUE Overall Strength Within Functional Limits - strength 5/5   Hand Function   Gross Motor Coordination Functional   Fine Motor Coordination Functional   Sensation   Light Touch No apparent deficits   Proprioception   Proprioception No apparent deficits   Vision-Basic Assessment   Current Vision   (glasses)   Vision - Complex Assessment   Acuity Able to read clock/calendar on wall without difficulty   Perception   Inattention/Neglect Appears intact   Cognition   Overall Cognitive Status WFL   Arousal/Participation Alert   Attention Within functional limits   Orientation Level Oriented X4   Memory Within functional limits   Following Commands Follows all commands and directions without difficulty   Assessment   Limitation Decreased ADL status; Decreased Safe judgement during ADL;Decreased high-level ADLs   Prognosis Good   Assessment Pt is a 71y/o female admitted to the hospital for an elected s/p L TKR(9/21) 2* hx OA  Pt is currently WBAT with her L LE  Pt with PMH anxiety, CKD, depression, hep B  PTA pt states independence with her ADLs, transfers, ambulation; +  During initial eval, pt demonstrated slight deficits with her functional balance, functional mobility, ADL status, transfer safety, activity tolerance(currently fair=15-20mins), and sit-stand transfers  Pt would benefit from 1-5 OT tx sessions for the above deficits  2-3xwk/1-2wks  Goals   Patient Goals "get back to the gym"   STG Time Frame   (1-7 days)   Short Term Goal #1 Pt will demonstrate improved activity tolerance to good(20-30mins) and standing tolerance to 3-5mins to assist with ADLs  Short Term Goal #2 Pt will demonstrate mod I with their sit-stand transfers to assist with completion of their LE dressing  Short Term Goal  Pt will demonstrate proper walker/transfer safety 100% of the time  Long Term Goal #1 Pt will demonstrate g/g- balance with all functional activities  Long Term Goal #2 Pt will demonstrate mod I with their UE and LE bathing/dresssing  Long Term Goal Pt will demonstrate improved activity tolerance to good(20-30mins) and standing tolerance to 3-5mins to assist with ADLs  Plan   Treatment Interventions ADL retraining;Functional transfer training; Endurance training;Patient/family training; Compensatory technique education   Goal Expiration Date 09/29/22   OT Treatment Day 0   OT Frequency 2-3x/wk   Recommendation   OT Discharge Recommendation Home with outpatient rehabilitation  (P T )   AM-PAC Daily Activity Inpatient   Lower Body Dressing 3   Bathing 3   Toileting 3   Upper Body Dressing 4   Grooming 4 Eating 4   Daily Activity Raw Score 21   Daily Activity Standardized Score (Calc for Raw Score >=11) 44 27   AM-PAC Applied Cognition Inpatient   Following a Speech/Presentation 4   Understanding Ordinary Conversation 4   Taking Medications 4   Remembering Where Things Are Placed or Put Away 4   Remembering List of 4-5 Errands 4   Taking Care of Complicated Tasks 4   Applied Cognition Raw Score 24   Applied Cognition Standardized Score 62 21   Edy Bui, OT

## 2022-09-22 NOTE — CONSULTS
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, registered nurse, status post left total knee replacement earlier today by Dr Maranda Welch  Advanced DJD, failed conservative treatments left knee  Ambulatory dysfunction  Mild volume depletion  Postoperative Rand catheter  Obesity with BMI of 35 1  Obstructive sleep apnea  Mood disorder with anxiety  History of intraductal papilloma right breast     Recommendation: -    Patient recuperating well following her surgery  Continue postoperative care as per orthopedic team   She carries underlying diagnosis of sleep apnea and has her own sleep BiPAP device which she brought along  Recommend use of the same at times when she is sleeping more somnolent  I have reviewed patients medications as initiated on post operative orders by the primary team  Recommend  monitoring closely for any hypoxemia / respiratory insufficieny related to narcotics and to reduce doses and frequency of narcotics if necessary  Resume patients home medications and I have reviewed them  She indicates that she did discontinue taking Lamictal when she was on vacation in [facility] in April  She does not want to resume it  She still continues to take Abilify, Wellbutrin and Cymbalta  Maintain Intravenous Fluids for next 24 -48 hours, till patient able to reliably keep meals and meds in  Suggest  Zofran ODT 4 mg sublingually PRN for control of post operative nausea  Patient encouraged to  use Incentive spirometry q 15 minutes while awake to minimize risk of postoperative atelectasis and pneumonia  Patient verbalized to understand and fully comprehend  Recommend DVT prophylaxis with use of Venodyne compression boots   If any factor X A inhibitor,  low molecule weight heparin or Coumadin is planned by the primary team, we will be happy to dose that drug based on patient's hemostasis  Maintain ASA as antiplatelet drug per Ortho  Team  Use Proton Pump inhibitor to minimize risk of gastritis  Monitor for any tinnitus as an early sign of salicylism and check salicylate levels in that situation  Discontinue patient's Rand catheter within next 24-48 hours in order  to minimize risk of catheter associated UTI  Please check patient's hemoglobin and hematocrit within next 24 hours to ensure that there is no acute blood loss anemia which would need any blood transfusion  We will follow this pleasant patient with your service closely and recommend necessary changes based on  further hospital course and diagnostics  Thank you, Dr Candace Mcghee , for your kind consultation  HISTORY OF PRESENT ILLNESS:    Reason for Consult:  Post operative management following elective left knee replacement    HPI: Amirah Cruz is a 71 y o  female , is a registered nurse, 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, moved from [place] to [place] few years ago  She has been very active most of her life and indicates of having ski injuries with twisting accidents affecting her left knee  She has been followed by orthopedic team and was found have advanced osteoarthritis both clinically and radiographically following which she was recommended to proceed with more definitive joint replacement  She was cleared by her outpatient team and is currently resting following her surgery which she had without any complications  Patient had chronic pain in her left knee which was limiting her activities of daily living  She works as a registered nurse and her work involves long hours of standing walking and that was getting impaired  Currently she is having mild to moderate pain well controlled with pain medications  She has underlying history of sleep apnea and uses her own BiPAP    She has history of mood disorder with anxiety and has been on medications through her primary care physician and psychiatrist       Review of Systems   Constitutional:  No recent  appetite change, denies chills, diaphoresis, fatigue or fever  HEENT:  Negative for congestion, sore throat and tinnitus  No visual complaints  Respiratory:  Negative cough, chest tightness, shortness of breath and wheezing  Cardiovascular:  Negative for chest pain and palpitations  Gastrointestinal: Negative for abdominal distention or pain, constipation, diarrhea and nausea  Endocrine: Negative for cold intolerance, heat intolerance, polydipsia and polyuria  Genitourinary:                Negative for  dysuria, flank pain  Tolerating Rand without difficulty  Skin:   Negative for color change, pallor and rash  Neurological:   Negative for dizziness, tremors, seizures, syncope, facial asymmetry,   speech difficulty, weakness, light-headedness, numbness and headaches  Hematological:  Musculoskeletal:  Psychiatric:  No h/o spontaneous bruising/bleeding  Joint pains as above  Negative for agitation, behavioral problems, confusion, decreased concentration, dysphoric mood and sleep disturbance  A complete system-based review of systems as discussed with patient is otherwise negative      PAST MEDICAL HISTORY:  Past Medical History:   Diagnosis Date    Anxiety     BiPAP (biphasic positive airway pressure) dependence     Breast cyst     Left    Chronic kidney disease     Depression     Elevated liver enzymes     GERD (gastroesophageal reflux disease)     Hepatitis B 1974    Hyperlipidemia     Infectious viral hepatitis     Hep B 1974    Sleep apnea     Uses bipap     Past Surgical History:   Procedure Laterality Date    BREAST BIOPSY Right 08/06/2021    intraductal papiloma    BREAST LUMPECTOMY Right 09/21/2021    Procedure: HALEY  DIRECTED LUMPECTOMY;  Surgeon: Gilford Elms, MD;  Location: [facility] OR;  Service: Surgical Oncology    COLONOSCOPY      HAND LIGAMENT RECONSTRUCTION Right  MRI BREAST BIOPSY RIGHT (ALL INCLUSIVE) Right 2021     BREAST HALEY  NEEDLE LOC RIGHT Right 2021       FAMILY HISTORY:  Non-contributory    SOCIAL HISTORY:  Social History     Substance and Sexual Activity   Alcohol Use Yes    Comment: rarely     Social History     Substance and Sexual Activity   Drug Use No     Social History     Tobacco Use   Smoking Status Former Smoker    Quit date:     Years since quittin 7   Smokeless Tobacco Former User       ALLERGIES:  Allergies   Allergen Reactions    Sulfa Antibiotics Rash       MEDICATIONS:  All current active medications have been reviewed    Current Facility-Administered Medications   Medication Dose Route Frequency Provider Last Rate Last Admin    acetaminophen (TYLENOL) tablet 650 mg  650 mg Oral Q6H PRN Yves Dus, PA-C   650 mg at 22 172    [START ON 2022] ARIPiprazole (ABILIFY) tablet 10 mg  10 mg Oral Daily Mariano MD Poncho        aspirin tablet 325 mg  325 mg Oral BID Yves Dus, PA-C   325 mg at 22    buPROPion (WELLBUTRIN XL) 24 hr tablet 150 mg  150 mg Oral Daily Mariano MD Poncho        ceFAZolin (ANCEF) IVPB (premix in dextrose) 1,000 mg 50 mL  1,000 mg Intravenous Q8H Yves Dus, PA-C 100 mL/hr at 22 172 1,000 mg at 22 172    [START ON 2022] cholecalciferol (VITAMIN D3) tablet 1,000 Units  1,000 Units Oral Daily Mariano MD Poncho        docusate sodium (COLACE) capsule 100 mg  100 mg Oral BID Yves Ahsan, PA-C   100 mg at 22 172    [START ON 2022] DULoxetine (CYMBALTA) delayed release capsule 60 mg  60 mg Oral Daily Mariano MD Vanesa Isaac ON 2022] ferrous sulfate tablet 325 mg  325 mg Oral Daily With Breakfast Yves Ahsan, PA-C        HYDROmorphone (DILAUDID) injection 0 5 mg  0 5 mg Intravenous Q2H PRN Yves Dus, PA-C        lactated ringers bolus 1,000 mL  1,000 mL Intravenous Once PRN Yves Dus, PA-C And    lactated ringers bolus 1,000 mL  1,000 mL Intravenous Once PRN Delano Rodriguez PA-C        lactated ringers infusion  125 mL/hr Intravenous Continuous Uday Carlsonhumble Penn DO   Stopped at 09/21/22 1427    lactated ringers infusion  100 mL/hr Intravenous Continuous Delano Rodriguez PA-C 100 mL/hr at 09/21/22 1433 100 mL/hr at 09/21/22 1433    ondansetron (ZOFRAN) injection 4 mg  4 mg Intravenous Q8H PRN VADIM Raman-C        oxyCODONE (ROXICODONE) immediate release tablet 10 mg  10 mg Oral Q4H PRN Delano Rodriguez, PA-C   10 mg at 09/21/22 1926    oxyCODONE (ROXICODONE) IR tablet 5 mg  5 mg Oral Q4H PRN Delano Rodriguez, PA-C   5 mg at 09/21/22 1433    [START ON 9/24/2022] pitavastatin (LIVALO) tablet 2 mg  2 mg Oral Daily Igor Orozco MD        [facility]) tablet 8 6 mg  1 tablet Oral Daily VADIM Raman-C   8 6 mg at 09/21/22 1433    sodium chloride 0 9 % bolus 1,000 mL  1,000 mL Intravenous Once PRN VADIM Raman-C        And    sodium chloride 0 9 % bolus 1,000 mL  1,000 mL Intravenous Once PRN Delano Rodriguez PA-C           Medications Prior to Admission   Medication    acetaminophen (TYLENOL) 500 mg tablet    ARIPiprazole (ABILIFY) 10 mg tablet    b complex vitamins capsule    buPROPion (WELLBUTRIN XL) 150 mg 24 hr tablet    cholecalciferol (VITAMIN D3) 1,000 units tablet    docusate sodium (COLACE) 100 mg capsule    DULoxetine (CYMBALTA) 60 mg delayed release capsule    famotidine (PEPCID) 20 mg tablet    LIVALO 2 MG    LORazepam (ATIVAN) 1 mg tablet    meloxicam (MOBIC) 7 5 mg tablet    senna (SENOKOT) 8 6 mg    acetaminophen-codeine (TYLENOL #3) 300-30 mg per tablet    ibuprofen (MOTRIN) 400 mg tablet    lamoTRIgine (LaMICtal) 200 MG tablet    naloxone (NARCAN) 4 mg/0 1 mL nasal spray           PHYSICAL EXAM:    Vitals:  Temp:  [97 4 °F (36 3 °C)-98 °F (36 7 °C)] 97 4 °F (36 3 °C)  HR:  [74-89] 89  Resp:  [13-19] 18  BP: (117-152)/(60-91) 141/76  SpO2:  [94 %-100 %] 98 %  Temp (24hrs), Av 8 °F (36 6 °C), Min:97 4 °F (36 3 °C), Max:98 °F (36 7 °C)  Current: Temperature: (!) 97 4 °F (36 3 °C)  Body mass index is 35 14 kg/m²  General Appearance:    Awake, alert, cooperative, no distress, appears of stated age  Head:    Normocephalic, without obvious abnormality, atraumatic   Eyes:    Pupils equal in size,conjunctiva/corneas clear, EOM's intact  Ears:    Hearing is not impaired   Nose:   No evidence of epistaxis/ discharge from nares  Throat:   Lips, mucosa, and tongue slightly dry  Neck:   Supple, symmetrical, trachea midline, no JVP  Back:     Symmetric, no curvature, no CVA tenderness   Lungs:     Bilateral air entry is broncho-alveolar and equal        No crepitation or rales  No pleural rub  Heart:    Regular rate and rhythm, S1S2 normal, no murmur, rub  Abdomen:     Soft, non-tender, no masses, no organomegaly   Genitalia:   Indwelling Arnd catheter  Clear urine +, No hematuria  Musculoskeletal:   Extremities appear normal, atraumatic, no cyanosis or edema  Surgical site on the operated left knee has clear bulky Ace wrap dressing / no bleeding or hemorrhage apparent  Vascular:   2+ in Posterior tibialis / dorsalis pedis  Skin:   Skin color, texture, turgor normal, no rashes or lesions   Neurologic:   Oriented/ Awake/ facial symmetry maintained  Speech is intact  Muscle bulk and strength is equivocal in B/L Upper and lower extremities except on operated limb  Light sensation is intact B/l LE  B/L Planta flexion is WNL  LABS, IMAGING, & OTHER STUDIES:  Lab Results:  I have personally reviewed pertinent labs    Lab Results   Component Value Date     (H) 2022    CO2 27 2022    BUN 12 2022    CREATININE 1 19 2022    EGFR 46 2022    CALCIUM 8 8 2022    AST 18 2022    ALT 33 2022    ALKPHOS 150 (H) 2022     Lab Results   Component Value Date    WBC 10 31 (H) 2022    WBC 11 08 (H) 09/13/2021    WBC 8 82 09/10/2020    HGB 14 7 08/29/2022    HGB 14 4 09/13/2021    HGB 14 0 09/10/2020     08/29/2022     09/13/2021     09/10/2020       Lab Results   Component Value Date    HGBA1C 6 0 (H) 09/02/2022    HGBA1C 5 7 (H) 09/10/2020    HGBA1C 6 0 07/05/2019         Imaging Studies:   I have personally reviewed pertinent imaging study reports  Imaging:     XR chest pa & lateral    Result Date: 8/30/2022  Narrative: CHEST INDICATION:   Z01 818: Encounter for other preprocedural examination Z01 812: Encounter for preprocedural laboratory examination  COMPARISON:  Chest radiograph 9/13/2021  EXAM PERFORMED/VIEWS:  XR CHEST PA & LATERAL FINDINGS: Cardiomediastinal silhouette appears unremarkable  The lungs are clear  No pneumothorax or pleural effusion  Healed bilateral rib fractures  Impression: No acute cardiopulmonary disease  Workstation performed: [id number]     US bedside procedure    Result Date: 9/21/2022  Narrative: 1 2 840 [id number]  2 [id number] 4 1      EKG, Pathology, and Other Studies:   I have personally reviewed pertinent reports  Portions of the record may have been created with voice recognition software  Occasional wrong word or "sound a like" substitutions may have occurred due to the inherent limitations of voice recognition software  Read the chart carefully and recognize, using context, where substitutions have occurred

## 2022-09-22 NOTE — PROGRESS NOTES
Orthopedic Total Knee Progress Note  ([facility])    ASSESSMENT & PLAN:  Status post- LEFT total knee arthroplasty:  LOS: 0 days    Doing well postoperatively  Pain Relief: Pt comfortable and pain controlled  Continues current post-op course  Activity: up with assistance  Working with PT / OT  Weight Bearing: WBAT      SUBJECTIVE:    Systemic or Specific Complaints: Pain medications covering pain  OBJECTIVE:  Vital signs in last 24 hours:  Temp:  [97 3 °F (36 3 °C)-98 2 °F (36 8 °C)] 98 2 °F (36 8 °C)  HR:  [74-97] 88  Resp:  [13-20] 18  BP: (109-152)/(57-91) 109/62  General: alert, appears stated age and cooperative   Neurovascular: Intact    Wound: No Erythema and Wound Intact   Range of Motion: Normal for post surgery   DVT Exam: Negative Moshe's sign  No cords or calf tenderness  No significant calf/ankle edema       Data Review:  CBC:   Lab Results   Component Value Date    WBC 15 25 (H) 09/22/2022    RBC 4 17 09/22/2022    HGB 11 8 09/22/2022    HCT 38 0 09/22/2022     09/22/2022     Plan:  DVT Prophylaxis   Mobilize with PT / OT  D/C Planning for Disposition    Masood Boogie PA-C  Date: 9/22/2022  Time: 7:51 AM

## 2022-09-22 NOTE — CASE MANAGEMENT
Case Management Discharge Planning Note    Patient name Tisha Franklin  Location East 2 /E2 [address] [facility]-* MRN [id number]  : 1950 Date 2022       Current Admission Date: 2022  Current Admission Diagnosis:Primary osteoarthritis of left knee   Patient Active Problem List    Diagnosis Date Noted    Primary osteoarthritis of left knee 2022    Anxiety     Depression     Bloody discharge from left nipple 2021    Breast lump or mass     Intraductal papilloma of breast, right 2021    Obesity, Class I, BMI 30-34 9 2019    Screening for diabetes mellitus 2019    Screening for thyroid disorder 2019    Abnormal weight gain 2019    Sleep apnea treated with nocturnal BiPAP     Complex sleep apnea syndrome       LOS (days): 0  Geometric Mean LOS (GMLOS) (days):   Days to GMLOS:     OBJECTIVE:            Current admission status: Outpatient Surgery   Preferred Pharmacy:   [facility], [address] [place] [address]  Phone: [phone] Fax: [address] [facility],  [address], [address] [place] [address]  Phone: [phone] Fax: [phone]    Primary Care Provider: Carmelita Reed DO    Primary Insurance: MEDICARE  Secondary Insurance: STATE FARM INSURANCE    DISCHARGE DETAILS:    Discharge planning discussed with[de-identified] Patient,  Diana Gomez of Choice:  (Pt discharging home with OP therapy )     CM contacted family/caregiver?: Yes  Were Treatment Team discharge recommendations reviewed with patient/caregiver?: Yes  Did patient/caregiver verbalize understanding of patient care needs?: Yes  Were patient/caregiver advised of the risks associated with not following Treatment Team discharge recommendations?: Yes    Contacts  Patient Contacts: Bryanna  Relationship to Patient[de-identified] Family  Contact Method:  In Person  Reason/Outcome: Emergency Contact, Discharge [address]         Is the patient interested in [facility] at discharge?: No    DME Referral Provided  Referral made for DME?: Yes  DME referral completed for the following items[de-identified] Kristen Lobo  DME Supplier Name[de-identified] [employer]    Other Referral/Resources/Interventions Provided:  Interventions: DME         Treatment Team Recommendation: Home  Discharge Destination Plan[de-identified] Home  Transport at Discharge : Family                       Accompanied by: Family member              Additional Comments: CM met with pt and spouse at bedside  Pt reports she has first OP PT appt scheduled for 9/26/22  RW ordered through [address] and delivered to pt's room with no copay  Pt with no questions or concerns  Spouse to transport patient home

## 2022-09-22 NOTE — PROGRESS NOTES
Progress Note - Internal Medicine   Jacoby Mcdaniel 71 y o  female MRN: [id number]  Unit/Bed#: E2 -01 Encounter: [id number]      Impression :    POD # 1, status post left total knee replacement by Dr Tanisha Barajas  Advanced DJD, failed conservative treatments left knee  Ambulatory dysfunction  Mild volume depletion--->> poor intake/ dehydration/ improved  Postoperative Rand catheter --->> removed  Obesity with BMI of 35 1  Obstructive sleep apnea  Mood disorder with anxiety  History of intraductal papilloma right breast    Recommendation:    · Monitor Orthostatics/ IVF Bolus as per Post op Hypotension protocol/ Increase IVF if necessary  · Continue DVT prophylaxis as per surgical team with  mg PO BID  · Monitor for any redness/ drainage / swelling around site of surgery and to notify Orthopedic team or PCP  · Recommend life style modifications and any high impact activities  · Continue PT /OT to improve endurance, range of motion, gait stabilization and use of assist device in a safe manner  · Recheck Hemoglobin and hematocrit  through PCP in 1 week upon discharge  · Full fall and orthostatic precautions  Subjective:     Patient seen and examined today  EMR and overnight events reviewed  Patient resting comfortably in bed  Has no further pain at present  Although indicates could not sleep well states that the beds are not very comfortable  Her Rand catheter has been removed and she has been increasing her intake of fluids by drinking more oral liquids  She has no nausea no vomiting  Denies any bleeding from her surgical site  Indicates orthopedic physician assistant already checked on her and has cleared her for possible discharge later today  Review of Systems     Constitutional: Denies appetite change  No chills, diaphoresis, fatigue or fever  HEENT: No congestion, sore throat  No visual complaints  Respiratory: No cough, chest tightness, shortness of breath and wheezing     Used her respi device for ANA overnight  Cardiovascular: No chest pain and palpitations  No Syncope or grey out  GI: Negative for abdominal distention, pain, constipation, diarrhea or nausea  Endocrine: Negative for cold or heat intolerance, polydipsia and polyuria  Genitourinary: Negative for decreased urine volume, dysuria, flank pain and urgency  Skin: Negative for rash/ no itching  Neurological: Negative for dizziness, weakness, numbness and headaches  Hematological: Negative for spontaneous bruising or bleeding  Psychiatric: Negative for confusion, dysphoric mood, hallucinations  All other systems reviewed and are negative  OBJECTIVE:     Vitals:     Temp:  [97 3 °F (36 3 °C)-98 2 °F (36 8 °C)] 98 2 °F (36 8 °C)  HR:  [74-97] 88  Resp:  [13-20] 18  BP: (109-152)/(57-91) 109/62  SpO2:  [94 %-100 %] 96 %  Temp (24hrs), Av 7 °F (36 5 °C), Min:97 3 °F (36 3 °C), Max:98 2 °F (36 8 °C)  Current: Temperature: 98 2 °F (36 8 °C)    Intake/Output Summary (Last 24 hours) at 2022 0748  Last data filed at 2022 0630  Gross per 24 hour   Intake 4195 ml   Output 1350 ml   Net 2845 ml     Body mass index is 35 14 kg/m²  Physical Exam    General Appearance:  Awake,alert, cooperative  Not in distress  Pallor / Icterus/ Cyanosis negative  Head:  Normocephalic, atraumatic  No titubations  Eyes:  No eye redness or discharge bilaterally  Neck: Supple, no raised JVP  Lungs:   B/L Clear to auscultation, no wheezing or rhonchi  Normal broncho-alveolar air entry  No pleural rubs  Heart:   Regular S1S2, A2P2 normal, no murmur or gallop  Abdomen:   Soft, non-tender,no rebound, bowel sounds+  Musculoskeletal: Extremities no cyanosis or edema  Surgical site on left knee has clean dressing  No discharge or drainage  Mild diminution and limited range of motion   Psych: Normal Affect / No hallucinations or delusions     Neurologic:           Skin:  Endocrine:  Vascular: Awake and alert  Mentation intact  Speech is intact  Facial symmetry is maintained  Muscle strength is 5/5 in all muscle groups except on operated limb which is limited due to recent surgery  Light touch and temperature sensation is maintained  No rashes /purpura  No open wounds  No thyromegaly / no lid lag  Homans sign is negative  B/L Calf are supple and non tender         Labs, Imaging, & Other studies:    All pertinent labs and imaging studies were personally reviewed  Results from last 7 days   Lab Units 09/22/22  0439   WBC Thousand/uL 15 25*   HEMOGLOBIN g/dL 11 8   PLATELETS Thousands/uL 284     Results from last 7 days   Lab Units 09/22/22  0439   POTASSIUM mmol/L 4 0   CHLORIDE mmol/L 106   CO2 mmol/L 28   BUN mg/dL 14   CREATININE mg/dL 1 03   EGFR ml/min/1 73sq m 54   CALCIUM mg/dL 8 4           Current Meds:  Current Facility-Administered Medications   Medication Dose Route Frequency Provider Last Rate Last Admin    acetaminophen (TYLENOL) tablet 650 mg  650 mg Oral Q6H PRN Mike Jones PA-C   650 mg at 09/21/22 1721    aluminum-magnesium hydroxide-simethicone (MYLANTA) oral suspension 30 mL  30 mL Oral BID PRN Abhijit Brannon MD        ARIPiprazole (ABILIFY) tablet 10 mg  10 mg Oral Daily Abhijit Brannon MD        aspirin tablet 325 mg  325 mg Oral BID Mike Jones PA-C   325 mg at 09/21/22 1721    buPROPion (WELLBUTRIN XL) 24 hr tablet 150 mg  150 mg Oral Daily Abhijit Brannon MD        cholecalciferol (VITAMIN D3) tablet 1,000 Units  1,000 Units Oral Daily Abhijit Brannon MD        docusate sodium (COLACE) capsule 100 mg  100 mg Oral BID Mike Jones PA-C   100 mg at 09/21/22 1722    DULoxetine (CYMBALTA) delayed release capsule 60 mg  60 mg Oral Daily Abhijit Brannon MD        famotidine (PEPCID) tablet 10 mg  10 mg Oral Daily Abhijit Brannon MD   10 mg at 09/21/22 2139    ferrous sulfate tablet 325 mg  325 mg Oral Daily With Breakfast Mike Jones PA-C        HYDROmorphone (DILAUDID) injection 0 5 mg  0 5 mg Intravenous Q2H PRN [place] Blomary kay, PA-C        lactated ringers bolus 1,000 mL  1,000 mL Intravenous Once PRN Zak Blomary kay, PA-C        And    lactated ringers bolus 1,000 mL  1,000 mL Intravenous Once PRN [place] Ezio, PA-C        lactated ringers infusion  125 mL/hr Intravenous Continuous Mae Rafaela Penn DO   Stopped at 09/21/22 1427    lactated ringers infusion  100 mL/hr Intravenous Continuous Zak Holguin, PA-C   Stopped at 09/22/22 0630    ondansetron (ZOFRAN) injection 4 mg  4 mg Intravenous Q8H PRN [place] Blomary kay PA-C        oxyCODONE (ROXICODONE) immediate release tablet 10 mg  10 mg Oral Q4H PRN [place] Blomary kay, PA-C   10 mg at 09/22/22 0617    oxyCODONE (ROXICODONE) IR tablet 5 mg  5 mg Oral Q4H PRN Zak Holguin, PA-C   5 mg at 09/21/22 1433    [START ON 9/24/2022] pitavastatin (LIVALO) tablet 2 mg  2 mg Oral Daily Wellington Metcalf MD        [facility]) tablet 8 6 mg  1 tablet Oral Daily EPIFANIO PetersonC   8 6 mg at 09/21/22 1433    sodium chloride 0 9 % bolus 1,000 mL  1,000 mL Intravenous Once PRN EPIFANIO PetersonC        And    sodium chloride 0 9 % bolus 1,000 mL  1,000 mL Intravenous Once PRN EPIFANIO PetersonC         Home Meds:  Medications Prior to Admission   Medication    acetaminophen (TYLENOL) 500 mg tablet    ARIPiprazole (ABILIFY) 10 mg tablet    b complex vitamins capsule    buPROPion (WELLBUTRIN XL) 150 mg 24 hr tablet    cholecalciferol (VITAMIN D3) 1,000 units tablet    docusate sodium (COLACE) 100 mg capsule    DULoxetine (CYMBALTA) 60 mg delayed release capsule    famotidine (PEPCID) 20 mg tablet    LIVALO 2 MG    LORazepam (ATIVAN) 1 mg tablet    meloxicam (MOBIC) 7 5 mg tablet    senna (SENOKOT) 8 6 mg    acetaminophen-codeine (TYLENOL #3) 300-30 mg per tablet    ibuprofen (MOTRIN) 400 mg tablet    lamoTRIgine (LaMICtal) 200 MG tablet    naloxone (NARCAN) 4 mg/0 1 mL nasal spray       Continuous Infusions:  lactated ringers, 125 mL/hr, Last Rate: Stopped (09/21/22 1427)  lactated ringers, 100 mL/hr, Last Rate: Stopped (09/22/22 0630)        Invasive Devices  Report    Peripheral Intravenous Line  Duration           Peripheral IV 09/21/22 Dorsal (posterior); Right Hand 1 day                VTE Pharmacologic Prophylaxis: Asprin 325mg BID as per Primary Ortho Team   VTE Mechanical Prophylaxis: sequential compression device    Portions of the record may have been created with voice recognition software  Occasional wrong word or "sound a like" substitutions may have occurred due to the inherent limitations of voice recognition software  Read the chart carefully and recognize, using context, where substitutions have occurred

## 2022-09-22 NOTE — PLAN OF CARE
Problem: OCCUPATIONAL THERAPY ADULT  Goal: Performs self-care activities at highest level of function for planned discharge setting  See evaluation for individualized goals  Description: Treatment Interventions: ADL retraining, Functional transfer training, Endurance training, Patient/family training, Compensatory technique education          See flowsheet documentation for full assessment, interventions and recommendations  Note: Limitation: Decreased ADL status, Decreased Safe judgement during ADL, Decreased high-level ADLs  Prognosis: Good  Assessment: Pt is a 69y/o female admitted to the hospital for an elected s/p L TKR(9/21) 2* hx OA  Pt is currently WBAT with her L LE  Pt with PMH anxiety, CKD, depression, hep B  PTA pt states independence with her ADLs, transfers, ambulation; +  During initial eval, pt demonstrated slight deficits with her functional balance, functional mobility, ADL status, transfer safety, activity tolerance(currently fair=15-20mins), and sit-stand transfers  Pt would benefit from 1-5 OT tx sessions for the above deficits  2-3xwk/1-2wks       OT Discharge Recommendation: Home with outpatient rehabilitation (P T )

## 2022-09-22 NOTE — Clinical Note
Procedure: TOTAL KNEE REPLACEMENT (Left Knee)   Anesthesia type: spinal   Diagnosis: Primary osteoarthritis of left knee [M17 12]

## 2022-09-22 NOTE — PLAN OF CARE
Problem: PHYSICAL THERAPY ADULT  Goal: Performs mobility at highest level of function for planned discharge setting  See evaluation for individualized goals  Description: Treatment/Interventions: Functional transfer training, LE strengthening/ROM, Therapeutic exercise, Patient/family training, Equipment eval/education, Bed mobility, Gait training, Compensatory technique education, Continued evaluation, Spoke to nursing  Equipment Recommended: Rissa Abel       See flowsheet documentation for full assessment, interventions and recommendations  Outcome: Progressing  Note: Prognosis: Good  Problem List: Decreased range of motion, Impaired balance, Decreased mobility, Obesity, Decreased skin integrity, Pain  Assessment: July Pritchard was seen now POD#1 L TKR  Reporting severe pain in L knee start of session that improved to moderate knee pain w repositioning and activity  Continues to function at distant S for transfers and ambulation w RW  Currently ambulating unlimited household distances  Decline ambulation limited community distances due to fatigue  Tolerated seated and standing therex well; written handout provided to supplement HEP  Given progress in therapy will clear pt for d/c home today  Has OPPT scheduled  Will need RW  Barriers to Discharge: None     PT Discharge Recommendation: Home with outpatient rehabilitation    See flowsheet documentation for full assessment

## 2022-09-23 ENCOUNTER — PATIENT OUTREACH (OUTPATIENT)
Dept: CASE MANAGEMENT | Facility: OTHER | Age: 72
End: 2022-09-23

## 2022-09-23 NOTE — PROGRESS NOTES
Outreach TC to patient for CM assessment  No answer to call  Left message on voicemail requesting a return call from patient to Grazyna Bazzi 12, BSN; Outpatient Care Manager @ [phone]  First unanswered call to patient  Chart review reveals that patient was inpatient x 24 hours for an elective L TKA by Dr Madeline Heath  Patient had surgery completed on 9/21/22 and was discharged home on 9/22/22 in stable condition  Patient is scheduled to see OP PT on 9/26/22  Patient will have a post-op appointment scheduled per Dr Domínguez Catching schedule  Outreach will continue to try to obtain a full assessment

## 2022-09-26 ENCOUNTER — EVALUATION (OUTPATIENT)
Dept: PHYSICAL THERAPY | Facility: REHABILITATION | Age: 72
End: 2022-09-26
Payer: MEDICARE

## 2022-09-26 DIAGNOSIS — Z96.652 AFTERCARE FOLLOWING LEFT KNEE JOINT REPLACEMENT SURGERY: Primary | ICD-10-CM

## 2022-09-26 DIAGNOSIS — Z47.1 AFTERCARE FOLLOWING LEFT KNEE JOINT REPLACEMENT SURGERY: Primary | ICD-10-CM

## 2022-09-26 PROCEDURE — 97110 THERAPEUTIC EXERCISES: CPT

## 2022-09-26 PROCEDURE — 97162 PT EVAL MOD COMPLEX 30 MIN: CPT

## 2022-09-26 NOTE — PROGRESS NOTES
PT Evaluation     Today's date: 2022  Patient name: Rima Yadav  : 1950  MRN: [id number]  Referring provider: Indira Wagner MD  Dx:   Encounter Diagnosis     ICD-10-CM    1  Aftercare following left knee joint replacement surgery  Z47 1     [id number]        Start Time: 1115  Stop Time: 1200  Total time in clinic (min): 45 minutes    Assessment  Assessment details: Rima Yadav is a 70 y/o female presenting to PT following s/p L TKA on 2022  Patient presents with decreased ROM of L knee and decreased L quad activation limiting her ability to complete functional activities  Gait assessment demonstrated step-to with rolling walker and antalgic gait  No red flags/sign of infection  Reviewed DVT precautions, patient verbalized understanding  Ordering Home e-stim unit for disuse atrophy in the left thigh to increase strength and function  Patient has demonstrated active range of motion in the left thigh  Patient would benefit from skilled PT to decrease pain, increase strength, and improve functional activities     Impairments: abnormal gait, abnormal muscle firing, abnormal or restricted ROM, impaired physical strength, lacks appropriate home exercise program, pain with function and weight-bearing intolerance    Symptom irritability: moderateUnderstanding of Dx/Px/POC: good   Prognosis: good    Goals  Short Term Goals (4 Weeks) :   1 ) Patient will report 50% reduction in pain   2 ) Patient will demonstrate >4/5 MMT with knee extension   3 ) Patient will demonstrate 120deg knee flexion   4 ) Patient will demonstrate 0-5deg knee extension     Long Term Goals (8 Weeks):   1 ) Patient will demonstrate 5/5 MMT with knee extension   2 ) Patient will demonstrate 90% symmetry with knee ROM from side to side   3 ) Perform HEP independently   4 ) Exceed FOTO predicted score   5 ) Will be able to resume her weekly gym routine with <3/10    Plan  Patient would benefit from: skilled physical therapy  Planned modality interventions: electrical stimulation/Russian stimulation  Planned therapy interventions: balance, ADL training, joint mobilization, manual therapy, motor coordination training, neuromuscular re-education, postural training, strengthening, stretching, therapeutic activities, therapeutic exercise, therapeutic training, transfer training, home exercise program, gait training, functional ROM exercises, flexibility, behavior modification, activity modification, balance/weight bearing training and patient education  Frequency: 2x week  Duration in visits: 16  Duration in weeks: 8  Treatment plan discussed with: patient        Subjective Evaluation    History of Present Illness  Date of surgery: 2022  Mechanism of injury: surgery  Mechanism of injury: Patient reports she has been having a lot of pain since TKA on 2022  Was in the hospital for one day following surgery  Patient has f/u in 3 weeks  Reports some swelling in the knee and instructed by surgeon to leave on steri-strips until they fall off  Denies any chest pain, nausea, vomiting, fever  States she has been taking oxycodone every 4-5 hours and reports taking some at 10:00 this morning before PT  Also taking Tylenol  Oxycodone also used to help patient sleep, reports some difficulty sleeping  Able to sleep in her bed with her leg straight  Reports pain when trying to put towel under heel when laying down  Has been using stationary bike for both passive and active range of motion  Location primarily in the front of her knee, reports some soreness in the back of knee  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  Job responsibilities include lots of walking, does not perform patient transfers             Not a recurrent problem   Quality of life: good    Pain  Current pain ratin  At best pain ratin  At worst pain ratin  Quality: burning  Relieving factors: ice and medications    Social Support  Lives in: [facility]  Lives with: spouse    Patient Goals  Patient goals for therapy: return to work, increased strength and decreased pain  Patient goal: Getting back to HI[employer] gym, walking, and work         Objective     Incision: healing well, minimal drainage, no redness, no warmth    Sensation: intact L side     R AROM: Flexion 135, Extension 0  L PROM: Flexion 75, Extension 15    Quad Setting/SLR: decreased quad activation on L, unable to perform SLR  Strength Testing: extension <3/5, flexion <3/5    Transitional Movements: pain with sitting on table with legs unsupported  Pain with towel/foam under ankle for knee extension             Precautions: L TKA 9/21/2022      Manuals 9/26                                                                Neuro Re-Ed             Seated Quad Set HEP             Seated SLR  HEP             Supine Heel Slide HEP             Seated Calf Stretch  HEP             Seated Knee Flexion Stretch  HEP                                       Ther Ex             VG             Bike             Weight Shifts  HEP             LAQ HEP                                                                 Ther Activity                                       Gait Training                                       Modalities

## 2022-09-26 NOTE — LETTER
2022    MD Benjamín Joseph    Patient: Daniel Lau   YOB: 1950   Date of Visit: 2022     Encounter Diagnosis     ICD-10-CM    1  Aftercare following left knee joint replacement surgery  Z47 1     [id number]        Dear Dr Phani Mistry: Thank you for your recent referral of Daniel Lau  Please review the attached evaluation summary from Jeanne's recent visit  Please verify that you agree with the plan of care by signing the attached order  If you have any questions or concerns, please do not hesitate to call  I sincerely appreciate the opportunity to share in the care of one of your patients and hope to have another opportunity to work with you in the near future  Sincerely,    Monik Lucio, PT      Referring Provider:      I certify that I have read the below Plan of Care and certify the need for these services furnished under this plan of treatment while under my care  MD Benjamín Joseph  Via Fax: [phone]          PT Evaluation     Today's date: 2022  Patient name: Daniel Lau  : 1950  MRN: [id number]  Referring provider: Ancelmo Denis MD  Dx:   Encounter Diagnosis     ICD-10-CM    1  Aftercare following left knee joint replacement surgery  Z47 1     [id number] [id number]        Start Time: 1115  Stop Time: 1200  Total time in clinic (min): 45 minutes    Assessment  Assessment details: Daniel Lau is a 72 y/o female presenting to PT following s/p L TKA on 2022  Patient presents with decreased ROM of L knee and decreased L quad activation limiting her ability to complete functional activities  Gait assessment demonstrated step-to with rolling walker and antalgic gait  No red flags/sign of infection  Reviewed DVT precautions, patient verbalized understanding   Ordering Home e-stim unit for disuse atrophy in the left thigh to increase strength and function  Patient has demonstrated active range of motion in the left thigh  Patient would benefit from skilled PT to decrease pain, increase strength, and improve functional activities     Impairments: abnormal gait, abnormal muscle firing, abnormal or restricted ROM, impaired physical strength, lacks appropriate home exercise program, pain with function and weight-bearing intolerance    Symptom irritability: moderateUnderstanding of Dx/Px/POC: good   Prognosis: good    Goals  Short Term Goals (4 Weeks) :   1 ) Patient will report 50% reduction in pain   2 ) Patient will demonstrate >4/5 MMT with knee extension   3 ) Patient will demonstrate 120deg knee flexion   4 ) Patient will demonstrate 0-5deg knee extension     Long Term Goals (8 Weeks):   1 ) Patient will demonstrate 5/5 MMT with knee extension   2 ) Patient will demonstrate 90% symmetry with knee ROM from side to side   3 ) Perform HEP independently   4 ) Exceed FOTO predicted score   5 ) Will be able to resume her weekly gym routine with <3/10    Plan  Patient would benefit from: skilled physical therapy  Planned modality interventions: electrical stimulation/Russian stimulation  Planned therapy interventions: balance, ADL training, joint mobilization, manual therapy, motor coordination training, neuromuscular re-education, postural training, strengthening, stretching, therapeutic activities, therapeutic exercise, therapeutic training, transfer training, home exercise program, gait training, functional ROM exercises, flexibility, behavior modification, activity modification, balance/weight bearing training and patient education  Frequency: 2x week  Duration in visits: 16  Duration in weeks: 8  Treatment plan discussed with: patient        Subjective Evaluation    History of Present Illness  Date of surgery: 9/21/2022  Mechanism of injury: surgery  Mechanism of injury: Patient reports she has been having a lot of pain since TKA on 2022  Was in the hospital for one day following surgery  Patient has f/u in 3 weeks  Reports some swelling in the knee and instructed by surgeon to leave on steri-strips until they fall off  Denies any chest pain, nausea, vomiting, fever  States she has been taking oxycodone every 4-5 hours and reports taking some at 10:00 this morning before PT  Also taking Tylenol  Oxycodone also used to help patient sleep, reports some difficulty sleeping  Able to sleep in her bed with her leg straight  Reports pain when trying to put towel under heel when laying down  Has been using stationary bike for both passive and active range of motion  Location primarily in the front of her knee, reports some soreness in the back of knee  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  Job responsibilities include lots of walking, does not perform patient transfers  Not a recurrent problem   Quality of life: good    Pain  Current pain ratin  At best pain ratin  At worst pain ratin  Quality: burning  Relieving factors: ice and medications    Social Support  L[facility] in: [facility]  Lives with: spouse    Patient Goals  Patient goals for therapy: return to work, increased strength and decreased pain  Patient goal: Getting back to [employer] gym, walking, and work         Objective     Incision: healing well, minimal drainage, no redness, no warmth    Sensation: intact L side     R AROM: Flexion 135, Extension 0  L PROM: Flexion 75, Extension 15    Quad Setting/SLR: decreased quad activation on L, unable to perform SLR  Strength Testing: extension <3/5, flexion <3/5    Transitional Movements: pain with sitting on table with legs unsupported  Pain with towel/foam under ankle for knee extension             Precautions: L TKA 2022      Manuals                                                                 Neuro Re-Ed             Seated Quad Set HEP             Seated SLR  HEP             Supine Heel Slide HEP             Seated Calf Stretch  HEP             Seated Knee Flexion Stretch  HEP                                       Ther Ex             VG             Bike             Weight Shifts  HEP             LAQ HEP                                                                 Ther Activity                                       Gait Training                                       Modalities

## 2022-09-29 ENCOUNTER — OFFICE VISIT (OUTPATIENT)
Dept: PHYSICAL THERAPY | Facility: REHABILITATION | Age: 72
End: 2022-09-29
Payer: MEDICARE

## 2022-09-29 DIAGNOSIS — Z47.1 AFTERCARE FOLLOWING LEFT KNEE JOINT REPLACEMENT SURGERY: Primary | ICD-10-CM

## 2022-09-29 DIAGNOSIS — Z96.652 AFTERCARE FOLLOWING LEFT KNEE JOINT REPLACEMENT SURGERY: Primary | ICD-10-CM

## 2022-09-29 PROCEDURE — 97110 THERAPEUTIC EXERCISES: CPT

## 2022-09-29 PROCEDURE — 97530 THERAPEUTIC ACTIVITIES: CPT

## 2022-09-29 PROCEDURE — 97140 MANUAL THERAPY 1/> REGIONS: CPT

## 2022-09-29 NOTE — PROGRESS NOTES
Daily Note     Today's date: 2022  Patient name: Dinora Salter  : 1950  MRN: [id number]  Referring provider: Kyle Ashley MD  Dx:   Encounter Diagnosis     ICD-10-CM    1  Aftercare following left knee joint replacement surgery  Z47 1     Z96 652        Start Time: 1143  Stop Time: 1815  Total time in clinic (min): 45 minutes    Subjective: Patient reports compliance with HEP  Patient reports 6/10 pain at worst and 3/10 pain at best since last visit  Patient reports improvements in gait since last visit  Objective: See treatment diary below      Assessment: Patient had moderate tolerance for TKE stretching during NMES  Patient tolerated all progressions well  Reviewed and educated patient on proper setup and use of NMES  Patient would benefit from continued PT      Plan: Continue per plan of care        Precautions: L TKA 2022      Manuals                                                   NMES Setup, education  20' (10' on)           Neuro Re-Ed             Seated Quad Set HEP             Seated SLR  HEP             Supine Heel Slide HEP             Seated Calf Stretch  HEP             Seated Knee Flexion Stretch  HEP             Std TKE w/ TB  otb 5" 2x10                        Ther Ex             VG  L7 5' w/ 10" TKE hold           Bike  10' no resistance to tolerance           Weight Shifts  HEP             LAQ HEP                                                                 Ther Activity                                       Gait Training                                       Modalities

## 2022-10-03 ENCOUNTER — OFFICE VISIT (OUTPATIENT)
Dept: PHYSICAL THERAPY | Facility: REHABILITATION | Age: 72
End: 2022-10-03
Payer: MEDICARE

## 2022-10-03 DIAGNOSIS — Z47.1 AFTERCARE FOLLOWING LEFT KNEE JOINT REPLACEMENT SURGERY: Primary | ICD-10-CM

## 2022-10-03 DIAGNOSIS — Z96.652 AFTERCARE FOLLOWING LEFT KNEE JOINT REPLACEMENT SURGERY: Primary | ICD-10-CM

## 2022-10-03 LAB
DME PARACHUTE DELIVERY DATE ACTUAL: NORMAL
DME PARACHUTE DELIVERY DATE REQUESTED: NORMAL
DME PARACHUTE ITEM DESCRIPTION: NORMAL
DME PARACHUTE ORDER STATUS: NORMAL
DME PARACHUTE SUPPLIER NAME: NORMAL
DME PARACHUTE SUPPLIER PHONE: NORMAL

## 2022-10-03 PROCEDURE — 97112 NEUROMUSCULAR REEDUCATION: CPT

## 2022-10-03 PROCEDURE — 97110 THERAPEUTIC EXERCISES: CPT

## 2022-10-03 NOTE — PROGRESS NOTES
Daily Note     Today's date: 10/3/2022  Patient name: Amirah Cruz  : 1950  MRN: [id number]  Referring provider: Esequiel Palma MD  Dx:   Encounter Diagnosis     ICD-10-CM    1  Aftercare following left knee joint replacement surgery  Z47 1     Z96 652        Start Time: 3297  Stop Time: 1130  Total time in clinic (min): 45 minutes    Subjective: Patient reports no complications with setting up STIM unit at home  Patient reports continued compliance with HEP  Objective: See treatment diary below      Assessment: Patient able to achieve full revolution on recumbent bike without pain  Patient tolerated stretching progressions very well  Patient demonstrates improved quad activation since last visit with the ability to perform SLR with 5-10 degrees of quad lag  Patient would benefit from continued PT      Plan: Continue per plan of care  Access Code: [email or web address]  URL: [email or web address]  Date: 10/03/2022  Prepared by: Alessandra Garg    Exercises  · Seated Quad Set - 2-3 x daily - 7 x weekly - 3 sets - 10 reps  · Seated Straight Leg Raise with Support - 2-3 x daily - 7 x weekly - 10 reps - 10 hold  · Supine Heel Slide - 2-3 x daily - 7 x weekly - 10 reps - 10 hold  · Side to Side Weight Shift with Counter Support - 2-3 x daily - 7 x weekly - 10 reps - 10 hold  · Seated Calf Towel Stretch - 2-3 x daily - 7 x weekly - 10 reps - 10 hold  · Seated Long Arc Quad - 2-3 x daily - 7 x weekly - 3 sets - 10 reps  · Seated Knee Flexion Stretch - 3 x daily - 7 x weekly - 3 sets - 10 reps  · Seated Knee Extension Stretch with Chair - 3 x daily - 7 x weekly - 3 sets - 10 reps - 10 hold  · Supine Active Straight Leg Raise - 2 x daily - 7 x weekly - 3 sets - 10 reps       Precautions: L TKA 2022      Manuals 9/26 9/29 10/3                                                 NMES Setup, education  20' (10' on)           Neuro Re-Ed             Seated Quad Set HEP             Seated SLR  HEP Supine Heel Slide HEP             Seated Calf Stretch  HEP             Seated Knee Flexion Stretch  HEP             Std TKE w/ TB  otb 5" 2x10 otb 5" 2x10                       Ther Ex             VG  L7 5' w/ 10" TKE hold L7 5' w/ 10" TKE hold          Bike  10' no resistance to tolerance 10' no resistance to tolerance          Weight Shifts  HEP             LAQ HEP             Supine SLR   2x10          Seated Knee Extension w/ OP   1' hold x5 1# ankle weight OP          Step Ups   nv          Minisquats   nv          Ther Activity                                       Gait Training                                       Modalities

## 2022-10-05 ENCOUNTER — PATIENT OUTREACH (OUTPATIENT)
Dept: CASE MANAGEMENT | Facility: OTHER | Age: 72
End: 2022-10-05

## 2022-10-05 RX ORDER — OXYCODONE HYDROCHLORIDE 5 MG/1
5 CAPSULE ORAL EVERY 4 HOURS PRN
COMMUNITY
Start: 2022-09-21

## 2022-10-05 NOTE — PROGRESS NOTES
Outreach TC to patient for initial care management assessment  Patient reports that she is feeling well  Patient denies s/sx of complications from L knee replacement including  Patient reports that she is independent with ADL's  Patient does need some assist with IADLs  Patient resides with spouse, Denisse Lua who assists as needed  Patient did not complete her JENNIFER appointment but did schedule a f/u with othopedics for 10/14/22  I reviewed medications including dose, schedule, purpose & side effects of acetaminophen, ASA, docusate sodium, ferrous sulfate, lorazepam, Abilify, Livalo, bupropion, B complex, Vitamin D3, duloxetine, lamotrigine and famotidine with patient who verbalizsd understanding  Reviewed future appointments, s/sx to report and how/when to report symptoms to provider vs  911  Patient verbalizes understanding  Patient educated on role of CM, contact information for CM and BPCI program  Patient declines additional calls

## 2022-10-06 ENCOUNTER — OFFICE VISIT (OUTPATIENT)
Dept: PHYSICAL THERAPY | Facility: REHABILITATION | Age: 72
End: 2022-10-06
Payer: MEDICARE

## 2022-10-06 DIAGNOSIS — Z47.1 AFTERCARE FOLLOWING LEFT KNEE JOINT REPLACEMENT SURGERY: Primary | ICD-10-CM

## 2022-10-06 DIAGNOSIS — Z96.652 AFTERCARE FOLLOWING LEFT KNEE JOINT REPLACEMENT SURGERY: Primary | ICD-10-CM

## 2022-10-06 PROCEDURE — 97530 THERAPEUTIC ACTIVITIES: CPT

## 2022-10-06 PROCEDURE — 97110 THERAPEUTIC EXERCISES: CPT

## 2022-10-06 NOTE — PROGRESS NOTES
Daily Note     Today's date: 10/6/2022  Patient name: Janina Morales  : 1950  MRN: [id number]  Referring provider: Tommy Li MD  Dx:   Encounter Diagnosis     ICD-10-CM    1  Aftercare following left knee joint replacement surgery  Z47 1     Z96 [id number]        Start Time:   Stop Time: 173  Total time in clinic (min): 45 minutes    Subjective: Patient reports she is getting better each week  Objective: See treatment diary below  Knee PROM      Assessment: Patient is making very good progress with each week  PROM steadily improving with each visit, extension is the most limited at this time  Updated and reviewed HEP to include prone knee flexion and prone knee hangs with weights  Patient tolerated ambulation with SPC very well without issue  Patient will transition to [facility] for public ambulation over this weekend  Patient would benefit from continued PT      Plan: Continue per plan of care        Precautions: L TKA 2022      Manuals 9/26 9/29 10/3 10/6                                                NMES Setup, education  20' (10' on)           Neuro Re-Ed             Seated Quad Set HEP             Seated SLR  HEP             Supine Heel Slide HEP             Seated Calf Stretch  HEP             Seated Knee Flexion Stretch  HEP             Std TKE w/ TB  otb 5" 2x10 otb 5" 2x10 otb 10" 2x10                      Ther Ex             VG  L7 5' w/ 10" TKE hold L7 5' w/ 10" TKE hold L7 5' w/ 10" TKE hold         Bike  10' no resistance to tolerance 10' no resistance to tolerance 10' no resistance to tolerance         Weight Shifts  HEP             LAQ HEP    1# 3x10         Supine SLR   2x10          Seated Knee Extension w/ OP   1' hold x5 1# ankle weight OP          Step Ups   nv          Minisquats   nv          Prone Knee Flexion    x15 5-10" holds         Prone Knee Extension w/ OP    30" x15 1# ankle weight         Ther Activity             Ambulation with LRAD    138 ft x 4 laps w/ [facility] closesupervision                      Gait Training                                       Modalities

## 2022-10-10 ENCOUNTER — OFFICE VISIT (OUTPATIENT)
Dept: PHYSICAL THERAPY | Facility: REHABILITATION | Age: 72
End: 2022-10-10
Payer: MEDICARE

## 2022-10-10 DIAGNOSIS — Z96.652 AFTERCARE FOLLOWING LEFT KNEE JOINT REPLACEMENT SURGERY: Primary | ICD-10-CM

## 2022-10-10 DIAGNOSIS — Z47.1 AFTERCARE FOLLOWING LEFT KNEE JOINT REPLACEMENT SURGERY: Primary | ICD-10-CM

## 2022-10-10 PROCEDURE — 97140 MANUAL THERAPY 1/> REGIONS: CPT

## 2022-10-10 PROCEDURE — 97110 THERAPEUTIC EXERCISES: CPT

## 2022-10-10 NOTE — PROGRESS NOTES
Daily Note     Today's date: 10/10/2022  Patient name: Lavonne Kessler  : 1950  MRN: [id number]  Referring provider: Kirk Tolliver MD  Dx:   Encounter Diagnosis     ICD-10-CM    1  Aftercare following left knee joint replacement surgery  Z47 1     Z96 652        Start Time: 1050  Stop Time: 1135  Total time in clinic (min): 45 minutes    Subjective: Patient reports ambulation with a SPC is going well  Patient reports new HEP went well at home  Continued posterior knee/thigh soreness and pain with TKE stretching  Objective: See treatment diary below      Assessment: Good tolerance for activities today  Knee PROM steadily improving with each visit  Tolerated IASTM well today which resulted in less posterior knee/thigh pain with TKE  Progress as tolerated  Patient would benefit from continued PT      Plan: Continue per plan of care        Precautions: L TKA 2022      Manuals 9/26 9/29 10/3 10/6 10/10                                  IASTM     MS gentle posterior knee/hamstrings        NMES Setup, education  20' (10' on)           Neuro Re-Ed             Seated Quad Set HEP             Seated SLR  HEP             Supine Heel Slide HEP             Seated Calf Stretch  HEP             Seated Knee Flexion Stretch  HEP             Std TKE w/ TB  otb 5" 2x10 otb 5" 2x10 otb 10" 2x10 otb 10" 2x10                     Ther Ex             VG  L7 5' w/ 10" TKE hold L7 5' w/ 10" TKE hold L7 5' w/ 10" TKE hold L7 5' w/ 10" TKE hold        Bike  10' no resistance to tolerance 10' no resistance to tolerance 10' no resistance to tolerance 10' no resistance to tolerance        Weight Shifts  HEP             LAQ HEP    1# 3x10 2# 3x15        Supine SLR   2x10          Seated Knee Extension w/ OP   1' hold x5 1# ankle weight OP          Step Ups   nv          Minisquats   nv          Prone Knee Flexion    x15 5-10" holds x15 5-10" holds        Prone Knee Extension w/ OP    30" x15 1# ankle weight 30" x10 1# ankle weight        Ther Activity             Ambulation with LRAD    138 ft x 4 laps w/ [facility] closesupervision                      Gait Training                                       Modalities

## 2022-10-13 ENCOUNTER — OFFICE VISIT (OUTPATIENT)
Dept: PHYSICAL THERAPY | Facility: REHABILITATION | Age: 72
End: 2022-10-13
Payer: MEDICARE

## 2022-10-13 DIAGNOSIS — Z47.1 AFTERCARE FOLLOWING LEFT KNEE JOINT REPLACEMENT SURGERY: Primary | ICD-10-CM

## 2022-10-13 DIAGNOSIS — Z96.652 AFTERCARE FOLLOWING LEFT KNEE JOINT REPLACEMENT SURGERY: Primary | ICD-10-CM

## 2022-10-13 PROCEDURE — 97110 THERAPEUTIC EXERCISES: CPT

## 2022-10-13 PROCEDURE — 97140 MANUAL THERAPY 1/> REGIONS: CPT

## 2022-10-13 NOTE — PROGRESS NOTES
Daily Note     Today's date: 10/13/2022  Patient name: Jarrell Flannery  : 1950  MRN: [id number]  Referring provider: Blue Mayers MD  Dx:   Encounter Diagnosis     ICD-10-CM    1  Aftercare following left knee joint replacement surgery  Z47 1     Z96 652        Start Time: 1615  Stop Time: 1700  Total time in clinic (min): 45 minutes    Subjective: Patient reports she had f/u with ortho, reports they are pleased with her progress  Objective: See treatment diary below       Assessment: Patient making steady improvements with each visit  PROM improving with each visit  Patient demonstrates impaired dynamic quad control with step ups  Patient would benefit from continued PT      Plan: Continue per plan of care        Precautions: L TKA 2022      Manuals 9/26 9/29 10/3 10/6 10/10 10/13                                 IASTM     [place] gentle posterior knee/hamstrings [place] gentle posterior knee/hamstrings       NMES Setup, education  20' (10' on)           Neuro Re-Ed             Seated Quad Set HEP             Seated SLR  HEP             Supine Heel Slide HEP             Seated Calf Stretch  HEP             Seated Knee Flexion Stretch  HEP             Std TKE w/ TB  otb 5" 2x10 otb 5" 2x10 otb 10" 2x10 otb 10" 2x10        Step ups      6" step x5       Ther Ex             VG  L7 5' w/ 10" TKE hold L7 5' w/ 10" TKE hold L7 5' w/ 10" TKE hold L7 5' w/ 10" TKE hold L7 5' w/ 10" TKE hold       Bike  10' no resistance to tolerance 10' no resistance to tolerance 10' no resistance to tolerance 10' no resistance to tolerance 10' no resistance to tolerance       Weight Shifts  HEP             LAQ HEP    1# 3x10 2# 3x15 2# 3x15       Supine SLR   2x10          Seated Knee Extension w/ OP   1' hold x5 1# ankle weight OP          Step Ups   nv          Minisquats   nv          Prone Knee Flexion    x15 5-10" holds x15 5-10" holds x15 10" holds       Prone Knee Extension w/ OP    30" x15 1# ankle weight 30" x10 1# ankle weight 15" holds x10 2# ankle weight       Ther Activity             Ambulation with LRAD    138 ft x 4 laps w/ [facility] closesupervision                      Gait Training                                       Modalities

## 2022-10-17 ENCOUNTER — OFFICE VISIT (OUTPATIENT)
Dept: PHYSICAL THERAPY | Facility: REHABILITATION | Age: 72
End: 2022-10-17
Payer: MEDICARE

## 2022-10-17 DIAGNOSIS — Z47.1 AFTERCARE FOLLOWING LEFT KNEE JOINT REPLACEMENT SURGERY: Primary | ICD-10-CM

## 2022-10-17 DIAGNOSIS — Z96.652 AFTERCARE FOLLOWING LEFT KNEE JOINT REPLACEMENT SURGERY: Primary | ICD-10-CM

## 2022-10-17 PROCEDURE — 97110 THERAPEUTIC EXERCISES: CPT

## 2022-10-17 PROCEDURE — 97112 NEUROMUSCULAR REEDUCATION: CPT

## 2022-10-20 ENCOUNTER — OFFICE VISIT (OUTPATIENT)
Dept: PHYSICAL THERAPY | Facility: REHABILITATION | Age: 72
End: 2022-10-20
Payer: MEDICARE

## 2022-10-20 DIAGNOSIS — Z96.652 AFTERCARE FOLLOWING LEFT KNEE JOINT REPLACEMENT SURGERY: Primary | ICD-10-CM

## 2022-10-20 DIAGNOSIS — Z47.1 AFTERCARE FOLLOWING LEFT KNEE JOINT REPLACEMENT SURGERY: Primary | ICD-10-CM

## 2022-10-20 PROCEDURE — 97110 THERAPEUTIC EXERCISES: CPT

## 2022-10-20 PROCEDURE — 97140 MANUAL THERAPY 1/> REGIONS: CPT

## 2022-10-20 NOTE — PROGRESS NOTES
Daily Note     Today's date: 10/20/2022  Patient name: Jacoby Mcdaniel  : 1950  MRN: [id number]  Referring provider: Eunice Dick MD  Dx:   Encounter Diagnosis     ICD-10-CM    1  Aftercare following left knee joint replacement surgery  Z47 1     Z96 652        Start Time: 1430  Stop Time: 1525  Total time in clinic (min): 55 minutes    Subjective: Patient reports she is taking less pain meds over the last few days, reports some slight increased soreness in her knee since then but nothing she cannot tolerate  Objective: See treatment diary below      Assessment: Patient progressing very nicely  Continue to focus on restoring quad strength  Added in balance activities today without any issues  Patient would benefit from continued PT      Plan: Continue per plan of care        Precautions: L TKA 2022      Manuals 9/26 9/29 10/3 10/6 10/10 10/13 10/17 10/20                               IASTM     [place] gentle posterior knee/hamstrings [place] gentle posterior knee/hamstrings [place] gentle posterior knee/hamstrings [place] gentle posterior knee/hamstrings     NMES Setup, education  20' (10' on)           Neuro Re-Ed             Seated Quad Set HEP             Seated SLR  HEP             Supine Heel Slide HEP             Seated Calf Stretch  HEP             Seated Knee Flexion Stretch  HEP             Std TKE w/ TB  otb 5" 2x10 otb 5" 2x10 otb 10" 2x10 otb 10" 2x10        Step ups      6" step x5 6" step 2x10 6" step 2x10     Ther Ex             VG  L7 5' w/ 10" TKE hold L7 5' w/ 10" TKE hold L7 5' w/ 10" TKE hold L7 5' w/ 10" TKE hold L7 5' w/ 10" TKE hold L7 5' w/ 10" TKE hold L7 5' w/ 10" TKE hold     Bike  10' no resistance to tolerance 10' no resistance to tolerance 10' no resistance to tolerance 10' no resistance to tolerance 10' no resistance to tolerance 10' no resistance to tolerance 10' L1     Rockerboard       nv AP and ML 5" hold x10 ea     SLB       nv      LAQ HEP    1# 3x10 2# 3x15 2# 3x15 3# 3x10 3# 3x10     Supine SLR   2x10          Seated Knee Extension w/ OP   1' hold x5 1# ankle weight OP          Leg Press       nv 40# 2x10 SL     Minisquats   nv    x10 2x10     Prone Knee Flexion    x15 5-10" holds x15 5-10" holds x15 10" holds x15 10" holds HEP     Prone Knee Extension w/ OP    30" x15 1# ankle weight 30" x10 1# ankle weight 15" holds x10 2# ankle weight 15" holds x10 2# ankle weight 1' hold x5 3# ankle weight     Std Knee Flexion       3# 3x10 3# 3x10     Ther Activity             Ambulation with LRAD    138 ft x 4 laps w/ [facility] closesupervision                      Gait Training                                       Modalities

## 2022-10-24 ENCOUNTER — APPOINTMENT (OUTPATIENT)
Dept: PHYSICAL THERAPY | Facility: REHABILITATION | Age: 72
End: 2022-10-24

## 2022-10-24 ENCOUNTER — OFFICE VISIT (OUTPATIENT)
Dept: PHYSICAL THERAPY | Facility: REHABILITATION | Age: 72
End: 2022-10-24
Payer: MEDICARE

## 2022-10-24 DIAGNOSIS — Z96.652 AFTERCARE FOLLOWING LEFT KNEE JOINT REPLACEMENT SURGERY: Primary | ICD-10-CM

## 2022-10-24 DIAGNOSIS — Z47.1 AFTERCARE FOLLOWING LEFT KNEE JOINT REPLACEMENT SURGERY: Primary | ICD-10-CM

## 2022-10-24 PROCEDURE — 97112 NEUROMUSCULAR REEDUCATION: CPT

## 2022-10-24 PROCEDURE — 97110 THERAPEUTIC EXERCISES: CPT

## 2022-10-24 NOTE — PROGRESS NOTES
Daily Note     Today's date: 10/24/2022  Patient name: Samira Mensah  : 1950  MRN: [id number]  Referring provider: Alfonso May MD  Dx:   Encounter Diagnosis     ICD-10-CM    1  Aftercare following left knee joint replacement surgery  Z47 1     Z96 652        Start Time: 1615  Stop Time: 1700  Total time in clinic (min): 45 minutes    Subjective: Patient reports her knee is stiff today because she was having intense LBP that prevented her from moving much for 3 days  Report LBP is better today but states her knee is stiff because of this  Objective: See treatment diary below      Assessment: Patient some increased stiffness today compared to last visit, likely secondary to inactivity over the weekend due to back pain  Held a few activities due to LBP  Resume as tolerated next visit  Patient would benefit from continued PT      Plan: Continue per plan of care        Precautions: L TKA 2022      Manuals 9/26 9/29 10/3 10/6 10/10 10/13 10/17 10/20 10/24                              IASTM     [place] gentle posterior knee/hamstrings [place] gentle posterior knee/hamstrings [place] gentle posterior knee/hamstrings [place] gentle posterior knee/hamstrings     NMES Setup, education  20' (10' on)           Neuro Re-Ed             Seated Quad Set HEP             Seated SLR  HEP             Supine Heel Slide HEP             Seated Calf Stretch  HEP             Seated Knee Flexion Stretch  HEP             Std TKE w/ TB  otb 5" 2x10 otb 5" 2x10 otb 10" 2x10 otb 10" 2x10        Step ups      6" step x5 6" step 2x10 6" step 2x10 np resume    Ther Ex             VG  L7 5' w/ 10" TKE hold L7 5' w/ 10" TKE hold L7 5' w/ 10" TKE hold L7 5' w/ 10" TKE hold L7 5' w/ 10" TKE hold L7 5' w/ 10" TKE hold L7 5' w/ 10" TKE hold L7 5' w/ 10" TKE hold    Bike  10' no resistance to tolerance 10' no resistance to tolerance 10' no resistance to tolerance 10' no resistance to tolerance 10' no resistance to tolerance 10' no resistance to tolerance 10' L1 7' L1    Rockerboard       nv AP and ML 5" hold x10 ea np resume    SLB       nv      LAQ HEP    1# 3x10 2# 3x15 2# 3x15 3# 3x10 3# 3x10 4# 3x10    Supine SLR   2x10          Seated Knee Extension w/ OP   1' hold x5 1# ankle weight OP          Leg Press       nv 40# 2x10 SL 40# 3x10 SL    Minisquats   nv    x10 2x10 40# 2x10 SL    Prone Knee Flexion    x15 5-10" holds x15 5-10" holds x15 10" holds x15 10" holds HEP 1' hold x5    Prone Knee Extension w/ OP    30" x15 1# ankle weight 30" x10 1# ankle weight 15" holds x10 2# ankle weight 15" holds x10 2# ankle weight 1' hold x5 3# ankle weight 5' hold 4# ankle weight    Std Knee Flexion       3# 3x10 3# 3x10 4# 3x10    Ther Activity             Ambulation with LRAD    138 ft x 4 laps w/ [facility] closesupervision                      Gait Training                                       Modalities

## 2022-10-27 ENCOUNTER — OFFICE VISIT (OUTPATIENT)
Dept: PHYSICAL THERAPY | Facility: REHABILITATION | Age: 72
End: 2022-10-27
Payer: MEDICARE

## 2022-10-27 DIAGNOSIS — Z47.1 AFTERCARE FOLLOWING LEFT KNEE JOINT REPLACEMENT SURGERY: Primary | ICD-10-CM

## 2022-10-27 DIAGNOSIS — Z96.652 AFTERCARE FOLLOWING LEFT KNEE JOINT REPLACEMENT SURGERY: Primary | ICD-10-CM

## 2022-10-27 PROCEDURE — 97112 NEUROMUSCULAR REEDUCATION: CPT

## 2022-10-27 PROCEDURE — 97110 THERAPEUTIC EXERCISES: CPT

## 2022-10-27 NOTE — PROGRESS NOTES
Daily Note     Today's date: 10/27/2022  Patient name: Bobie Nissen  : 1950  MRN: [id number]  Referring provider: Perry Vaz MD  Dx:   Encounter Diagnosis     ICD-10-CM    1  Aftercare following left knee joint replacement surgery  Z47 1     Z96 652        Start Time: 1345  Stop Time: 1430  Total time in clinic (min): 45 minutes    Subjective: Patient reports her knee is doing well  Objective: See treatment diary below      Assessment: Patient knee is doing very well, ROM and strength are improving with each visit  Unable to progress patient adequately due to ongoing low back pain  Will progress patient as tolerated  Patient would benefit from continued PT      Plan: Continue per plan of care        Precautions: L TKA 2022      Manuals 9/26 9/29 10/3 10/6 10/10 10/13 10/17 10/20 10/24 10/27                             IASTM     [place] gentle posterior knee/hamstrings [place] gentle posterior knee/hamstrings [place] gentle posterior knee/hamstrings [place] gentle posterior knee/hamstrings     NMES Setup, education  20' (10' on)           Neuro Re-Ed             Seated Quad Set HEP             Seated SLR  HEP             Supine Heel Slide HEP             Seated Calf Stretch  HEP             Seated Knee Flexion Stretch  HEP             Std TKE w/ TB  otb 5" 2x10 otb 5" 2x10 otb 10" 2x10 otb 10" 2x10        Step ups      6" step x5 6" step 2x10 6" step 2x10 np resume    Ther Ex             VG  L7 5' w/ 10" TKE hold L7 5' w/ 10" TKE hold L7 5' w/ 10" TKE hold L7 5' w/ 10" TKE hold L7 5' w/ 10" TKE hold L7 5' w/ 10" TKE hold L7 5' w/ 10" TKE hold L7 5' w/ 10" TKE hold L7 5' w/ 10" TKE hold   Bike  10' no resistance to tolerance 10' no resistance to tolerance 10' no resistance to tolerance 10' no resistance to tolerance 10' no resistance to tolerance 10' no resistance to tolerance 10' L1 7' L1 10' L1   Rockerboard       nv AP and ML 5" hold x10 ea np resume    SLB       nv      LAQ HEP    1# 3x10 2# 3x15 2# 3x15 3# 3x10 3# 3x10 4# 3x10 4# 3x10   Supine SLR   2x10          Seated Knee Extension w/ OP   1' hold x5 1# ankle weight OP          Leg Press       nv 40# 2x10 SL 40# 3x10 SL 40# 3x10 SL   Minisquats   nv    x10 2x10     Prone Knee Flexion    x15 5-10" holds x15 5-10" holds x15 10" holds x15 10" holds HEP 1' hold x5 1' hold x5   Prone Knee Extension w/ OP    30" x15 1# ankle weight 30" x10 1# ankle weight 15" holds x10 2# ankle weight 15" holds x10 2# ankle weight 1' hold x5 3# ankle weight 5' hold 4# ankle weight 5' hold 4# ankle weight   Std Knee Flexion       3# 3x10 3# 3x10 4# 3x10 4# 3x10   Ther Activity             Ambulation with LRAD    138 ft x 4 laps w/ [facility] closesupervision                      Gait Training                                       Modalities

## 2022-10-31 ENCOUNTER — PATIENT OUTREACH (OUTPATIENT)
Dept: CASE MANAGEMENT | Facility: OTHER | Age: 72
End: 2022-10-31

## 2022-10-31 ENCOUNTER — APPOINTMENT (OUTPATIENT)
Dept: PHYSICAL THERAPY | Facility: REHABILITATION | Age: 72
End: 2022-10-31

## 2022-10-31 NOTE — PROGRESS NOTES
Chart review reveals that patient continues to work with outpatient PT with her most recent visit being 10/27/22  Patient's notes from the PT reveal that patient is making progress but slowly  Patient's progress is somewhat hampered by patient's low back pain  Patient's orthopedic appointments are not able to be seen in [address] [place] [address] Rd  Patient has declined calls from this RN care manager so chart review will continue

## 2022-11-02 ENCOUNTER — OFFICE VISIT (OUTPATIENT)
Dept: PHYSICAL THERAPY | Facility: REHABILITATION | Age: 72
End: 2022-11-02

## 2022-11-02 DIAGNOSIS — Z96.652 AFTERCARE FOLLOWING LEFT KNEE JOINT REPLACEMENT SURGERY: Primary | ICD-10-CM

## 2022-11-02 DIAGNOSIS — Z47.1 AFTERCARE FOLLOWING LEFT KNEE JOINT REPLACEMENT SURGERY: Primary | ICD-10-CM

## 2022-11-02 NOTE — PROGRESS NOTES
Daily Note     Today's date: 2022  Patient name: Rito Britton  : 1950  MRN: [id number]  Referring provider: Jessica Curry MD  Dx:   Encounter Diagnosis     ICD-10-CM    1  Aftercare following left knee joint replacement surgery  Z47 1     Z96 652                   Subjective: Pt reports her L knee has been doing well  States she has not received results of MRI for her back  Objective: See treatment diary below      Assessment: Tolerated treatment well  Continued with program as outlined below  Continues to make good progress toward long term goals established by evaluating PT  Patient would benefit from continued PT to further improve strength, increase available ROM, and maximize overall function with ADL's  Plan: Continue per plan of care        Precautions: L TKA 2022      Manuals 11/2   10/6 10/10 10/13 10/17 10/20 10/24 10/27                             IASTM     [place] gentle posterior knee/hamstrings [place] gentle posterior knee/hamstrings RI gentle posterior knee/hamstrings RI gentle posterior knee/hamstrings     NMES Setup, education             Neuro Re-Ed             Seated Quad Set             Seated SLR              Supine Heel Slide             Seated Calf Stretch              Seated Knee Flexion Stretch              Std TKE w/ TB    otb 10" 2x10 otb 10" 2x10        Step ups 6" step 2x10     6" step x5 6" step 2x10 6" step 2x10 np resume    Ther Ex             VG L7 5' w/ 10" TKE hold   L7 5' w/ 10" TKE hold L7 5' w/ 10" TKE hold L7 5' w/ 10" TKE hold L7 5' w/ 10" TKE hold L7 5' w/ 10" TKE hold L7 5' w/ 10" TKE hold L7 5' w/ 10" TKE hold   Bike 10' L1   10' no resistance to tolerance 10' no resistance to tolerance 10' no resistance to tolerance 10' no resistance to tolerance 10' L1 7' L1 10' L1   Rockerboard       nv AP and ML 5" hold x10 ea np resume    SLB       nv      LAQ 4# 3x10   1# 3x10 2# 3x15 2# 3x15 3# 3x10 3# 3x10 4# 3x10 4# 3x10   Supine SLR Seated Knee Extension w/ OP             Leg Press 40# 3x10 SL      nv 40# 2x10 SL 40# 3x10 SL 40# 3x10 SL   Minisquats       x10 2x10     Prone Knee Flexion 1' hold x5   x15 5-10" holds x15 5-10" holds x15 10" holds x15 10" holds HEP 1' hold x5 1' hold x5   Prone Knee Extension w/ OP supine5' hold 4#   30" x15 1# ankle weight 30" x10 1# ankle weight 15" holds x10 2# ankle weight 15" holds x10 2# ankle weight 1' hold x5 3# ankle weight 5' hold 4# ankle weight 5' hold 4# ankle weight   Std Knee Flexion 4# 3x10      3# 3x10 3# 3x10 4# 3x10 4# 3x10   Ther Activity             Ambulation with LRAD    138 ft x 4 laps [facility] closesupervision                      Gait Training                                       Modalities

## 2022-11-03 ENCOUNTER — OFFICE VISIT (OUTPATIENT)
Dept: PHYSICAL THERAPY | Facility: REHABILITATION | Age: 72
End: 2022-11-03

## 2022-11-03 DIAGNOSIS — Z96.652 AFTERCARE FOLLOWING LEFT KNEE JOINT REPLACEMENT SURGERY: Primary | ICD-10-CM

## 2022-11-03 DIAGNOSIS — Z47.1 AFTERCARE FOLLOWING LEFT KNEE JOINT REPLACEMENT SURGERY: Primary | ICD-10-CM

## 2022-11-03 NOTE — PROGRESS NOTES
Daily Note     Today's date: 11/3/2022  Patient name: Emelina Guerrero  : 1950  MRN: [id number]  Referring provider: Sabiha Lebron MD  Dx:   Encounter Diagnosis     ICD-10-CM    1  Aftercare following left knee joint replacement surgery  Z47 1     A25 [id number]                   Subjective: States that her back is not bothering her anymore  Knee is good  Objective: See treatment diary below    Assessment: Focus on LE strength this session  Able to complete STS from standard chair w/o use of UE after practice w/ foam  She would benefit from continued skilled PT services to continue to increase level of function  Plan: Continue per plan of care        Precautions: L TKA 2022      Manuals 11/2 11/3  10/6 10/10 10/13 10/17 10/20 10/24 10/27                             IASTM     [place] gentle posterior knee/hamstrings NV gentle posterior knee/hamstrings NV gentle posterior knee/hamstrings NV gentle posterior knee/hamstrings     NMES Setup, education             Neuro Re-Ed             Seated Quad Set             Seated SLR              Supine Heel Slide             Seated Calf Stretch              Seated Knee Flexion Stretch              Std TKE w/ TB    otb 10" 2x10 otb 10" 2x10        Step ups 6" step 2x10     6" step x5 6" step 2x10 6" step 2x10 np resume    Sand dune step up  2x8 ea                                     Ther Ex             VG L7 5' w/ 10" TKE hold L7 5'  L7 5' w/ 10" TKE hold L7 5' w/ 10" TKE hold L7 5' w/ 10" TKE hold L7 5' w/ 10" TKE hold L7 5' w/ 10" TKE hold L7 5' w/ 10" TKE hold L7 5' w/ 10" TKE hold   Bike 10' L1 10' L1   10' no resistance to tolerance 10' no resistance to tolerance 10' no resistance to tolerance 10' no resistance to tolerance 10' L1 7' L1 10' L1   Rockerboard       nv AP and ML 5" hold x10 ea np resume    SLB       nv      LAQ 4# 3x10 5# 10x, 7 5# 2x8  1# 3x10 2# 3x15 2# 3x15 3# 3x10 3# 3x10 4# 3x10 4# 3x10   Supine SLR             Seated Knee Extension w/ OP Leg Press 40# 3x10 SL 60# 3x8 SL     nv 40# 2x10 SL 40# 3x10 SL 40# 3x10 SL   Minisquats       x10 2x10     Prone Knee Flexion 1' hold x5   x15 5-10" holds x15 5-10" holds x15 10" holds x15 10" holds HEP 1' hold x5 1' hold x5   Prone Knee Extension w/ OP supine5' hold 4#   30" x15 1# ankle weight 30" x10 1# ankle weight 15" holds x10 2# ankle weight 15" holds x10 2# ankle weight 1' hold x5 3# ankle weight 5' hold 4# ankle weight 5' hold 4# ankle weight   Std Knee Flexion 4# 3x10      3# 3x10 3# 3x10 4# 3x10 4# 3x10   STS  4x7 w/ foam, 2x5 w/o                        Ther Activity             Ambulation with LRAD    138 ft x 4 laps w/ [facility]upervision                      Gait Training                                       Modalities

## 2022-11-07 ENCOUNTER — OFFICE VISIT (OUTPATIENT)
Dept: PHYSICAL THERAPY | Facility: REHABILITATION | Age: 72
End: 2022-11-07

## 2022-11-07 DIAGNOSIS — Z96.652 AFTERCARE FOLLOWING LEFT KNEE JOINT REPLACEMENT SURGERY: Primary | ICD-10-CM

## 2022-11-07 DIAGNOSIS — Z47.1 AFTERCARE FOLLOWING LEFT KNEE JOINT REPLACEMENT SURGERY: Primary | ICD-10-CM

## 2022-11-07 NOTE — PROGRESS NOTES
Daily Note     Today's date: 2022  Patient name: Diaz Ibarra  : 1950  MRN: [id number]  Referring provider: Madison Akins MD  Dx:   Encounter Diagnosis     ICD-10-CM    1  Aftercare following left knee joint replacement surgery  Z47 1     Z96 [id number]        Start Time: 1445  Stop Time: 1530  Total time in clinic (min): 45 minutes    Subjective: Patient reports some mild swelling and discomfort after last session that resolved within 1 day  Objective: See treatment diary below      Assessment: Tolerated all progressions without issue  Patient progressing very nicely  Continue to focus on functional activities  Patient would benefit from continued PT      Plan: Continue per plan of care        Precautions: L TKA 2022      Manuals 11/2 11/3 11/7    10/17 10/20 10/24 10/27                             IASTM       [place] gentle posterior knee/hamstrings [place] gentle posterior knee/hamstrings     NMES Setup, education             Neuro Re-Ed             Seated Quad Set             Seated SLR              Supine Heel Slide             Seated Calf Stretch              Seated Knee Flexion Stretch              Std TKE w/ TB             Step ups 6" step 2x10  8" step 4x5    6" step 2x10 6" step 2x10 np resume    Sand dune step up  2x8 ea 2x10                                    Ther Ex             VG L7 5' w/ 10" TKE hold L7 5' L7 5'    L7 5' w/ 10" TKE hold L7 5' w/ 10" TKE hold L7 5' w/ 10" TKE hold L7 5' w/ 10" TKE hold   Bike 10' L1 10' L1  10' L1     10' no resistance to tolerance 10' L1 7' L1 10' L1   Rockerboard       nv AP and ML 5" hold x10 ea np resume    SLB       nv      LAQ 4# 3x10 5# 10x, 7 5# 2x8 7 5# 3x8    3# 3x10 3# 3x10 4# 3x10 4# 3x10   Supine SLR             Seated Knee Extension w/ OP             Leg Press 40# 3x10 SL 60# 3x8 SL np resume    nv 40# 2x10 SL 40# 3x10 SL 40# 3x10 SL   Minisquats       x10 2x10     Prone Knee Flexion 1' hold x5      x15 10" holds HEP 1' hold x5 1' hold x5 Prone Knee Extension w/ OP supine5' hold 4#      15" holds x10 2# ankle weight 1' hold x5 3# ankle weight 5' hold 4# ankle weight 5' hold 4# ankle weight   Std Knee Flexion 4# 3x10      3# 3x10 3# 3x10 4# 3x10 4# 3x10   STS  4x7 w/ foam, 2x5 w/o 3x10 w/o foam          Modified Luiz Pose w/ Foam   x10 5" hold to tolerance          Ther Activity             Ambulation with LRAD                          Gait Training                                       Modalities

## 2022-11-10 ENCOUNTER — OFFICE VISIT (OUTPATIENT)
Dept: PHYSICAL THERAPY | Facility: REHABILITATION | Age: 72
End: 2022-11-10

## 2022-11-10 DIAGNOSIS — Z96.652 AFTERCARE FOLLOWING LEFT KNEE JOINT REPLACEMENT SURGERY: Primary | ICD-10-CM

## 2022-11-10 DIAGNOSIS — Z47.1 AFTERCARE FOLLOWING LEFT KNEE JOINT REPLACEMENT SURGERY: Primary | ICD-10-CM

## 2022-11-10 NOTE — PROGRESS NOTES
Daily Note     Today's date: 11/10/2022  Patient name: Riki Lombard  : 1950  MRN: [id number]  Referring provider: Renard Burgos MD  Dx:   Encounter Diagnosis     ICD-10-CM    1  Aftercare following left knee joint replacement surgery  Z47 1     Z96 [id number]        Start Time: 1600  Stop Time: 1645  Total time in clinic (min): 45 minutes    Subjective: Patient reports her knee is doing very well  Patient reports her is only sore after PT, which resolves the next day  Objective: See treatment diary below      Assessment: Patient improving nicely  Continues to progress nicely  Patient would benefit from continued PT      Plan: Continue per plan of care        Precautions: L TKA 2022      Manuals 11/2 11/3 11/7 11/10   10/17 10/20 10/24 10/27                             IASTM       [place] gentle posterior knee/hamstrings [place] gentle posterior knee/hamstrings     NMES Setup, education             Neuro Re-Ed             Seated Quad Set             Seated SLR              Supine Heel Slide             Seated Calf Stretch              Seated Knee Flexion Stretch              Std TKE w/ TB             Step ups 6" step 2x10  8" step 4x5 8" step 3x8   6" step 2x10 6" step 2x10 np resume    Sand dune step up  2x8 ea 2x10                                    Ther Ex             VG L7 5' w/ 10" TKE hold L7 5' L7 5' L7 5'   L7 5' w/ 10" TKE hold L7 5' w/ 10" TKE hold L7 5' w/ 10" TKE hold L7 5' w/ 10" TKE hold   Bike 10' L1 10' L1  10' L1  10' L1   10' no resistance to tolerance 10' L1 7' L1 10' L1   Rockerboard       nv AP and ML 5" hold x10 ea np resume    SLB       nv      LAQ 4# 3x10 5# 10x, 7 5# 2x8 7 5# 3x8 7 5# 3x10   3# 3x10 3# 3x10 4# 3x10 4# 3x10   Supine SLR             Seated Knee Extension w/ OP             Leg Press 40# 3x10 SL 60# 3x8 SL np resume 80# 3x8 SL   nv 40# 2x10 SL 40# 3x10 SL 40# 3x10 SL   Minisquats       x10 2x10     Prone Knee Flexion 1' hold x5      x15 10" holds HEP 1' hold x5 1' hold x5 Prone Knee Extension w/ OP supine5' hold 4#      15" holds x10 2# ankle weight 1' hold x5 3# ankle weight 5' hold 4# ankle weight 5' hold 4# ankle weight   Std Knee Flexion 4# 3x10      3# 3x10 3# 3x10 4# 3x10 4# 3x10   STS  4x7 w/ foam, 2x5 w/o 3x10 w/o foam 3x5 chair  3x5 plinth         Modified Luiz Pose w/ Foam   x10 5" hold to tolerance          Ther Activity             Ambulation with LRAD                          Gait Training                                       Modalities

## 2022-11-14 ENCOUNTER — OFFICE VISIT (OUTPATIENT)
Dept: PHYSICAL THERAPY | Facility: REHABILITATION | Age: 72
End: 2022-11-14

## 2022-11-14 DIAGNOSIS — Z47.1 AFTERCARE FOLLOWING LEFT KNEE JOINT REPLACEMENT SURGERY: Primary | ICD-10-CM

## 2022-11-14 DIAGNOSIS — Z96.652 AFTERCARE FOLLOWING LEFT KNEE JOINT REPLACEMENT SURGERY: Primary | ICD-10-CM

## 2022-11-14 NOTE — PROGRESS NOTES
Daily Note     Today's date: 2022  Patient name: Giovanni Avila  : 1950  MRN: [id number]  Referring provider: Elsi Wray MD  Dx:   Encounter Diagnosis     ICD-10-CM    1  Aftercare following left knee joint replacement surgery  Z47 1     Z96 [id number]        Start Time: 1445  Stop Time: 1525  Total time in clinic (min): 40 minutes    Subjective: Patient reports her knee feels good  Objective: See treatment diary below      Assessment: Patient progressing very nicely  Challenged with SLB balance activities on sand-dune  Continue to progress as tolerated  Patient would benefit from continued PT      Plan: Continue per plan of care        Precautions: L TKA 2022      Manuals 11/2 11/3 11/7 11/10 11/14  10/17 10/20 10/24 10/27                             IASTM       [place] gentle posterior knee/hamstrings [place] gentle posterior knee/hamstrings     NMES Setup, education             Neuro Re-Ed             Step ups 6" step 2x10  8" step 4x5 8" step 3x8 8" step 3x10  6" step 2x10 6" step 2x10 np resume    Sand dune step up  2x8 ea 2x10  x12 ea 3" hold                                  Ther Ex             VG L7 5' w/ 10" TKE hold L7 5' L7 5' L7 5' L5 5' SL  L7 5' w/ 10" TKE hold L7 5' w/ 10" TKE hold L7 5' w/ 10" TKE hold L7 5' w/ 10" TKE hold   Bike 10' L1 10' L1  10' L1  10' L1 10' L1  10' no resistance to tolerance 10' L1 7' L1 10' L1   Rockerboard       nv AP and ML 5" hold x10 ea np resume    LAQ 4# 3x10 5# 10x, 7 5# 2x8 7 5# 3x8 7 5# 3x10 7 5# 3x10  3# 3x10 3# 3x10 4# 3x10 4# 3x10   Leg Press 40# 3x10 SL 60# 3x8 SL np resume 80# 3x8 SL 80# 3x10 SL  nv 40# 2x10 SL 40# 3x10 SL 40# 3x10 SL   Minisquats       x10 2x10     Prone Knee Flexion 1' hold x5      x15 10" holds HEP 1' hold x5 1' hold x5   Prone Knee Extension w/ OP supine5' hold 4#      15" holds x10 2# ankle weight 1' hold x5 3# ankle weight 5' hold 4# ankle weight 5' hold 4# ankle weight   Std Knee Flexion 4# 3x10      3# 3x10 3# 3x10 4# 3x10 4# 3x10   STS  4x7 w/ foam, 2x5 w/o 3x10 w/o foam 3x5 chair  3x5 plinth 5x5 plinth        Modified Luiz Pose w/ Foam   x10 5" hold to tolerance          Ther Activity                          Gait Training                                       Modalities

## 2022-11-17 ENCOUNTER — OFFICE VISIT (OUTPATIENT)
Dept: PHYSICAL THERAPY | Facility: REHABILITATION | Age: 72
End: 2022-11-17

## 2022-11-17 DIAGNOSIS — Z96.652 AFTERCARE FOLLOWING LEFT KNEE JOINT REPLACEMENT SURGERY: Primary | ICD-10-CM

## 2022-11-17 DIAGNOSIS — Z47.1 AFTERCARE FOLLOWING LEFT KNEE JOINT REPLACEMENT SURGERY: Primary | ICD-10-CM

## 2022-11-17 NOTE — PROGRESS NOTES
Daily Note     Today's date: 2022  Patient name: Carie Upton  : 1950  MRN: [id number]  Referring provider: Mel Rae MD  Dx:   Encounter Diagnosis     ICD-10-CM    1  Aftercare following left knee joint replacement surgery  Z47 1     Z96 652           Start Time: 1600  Stop Time: 1645  Total time in clinic (min): 45 minutes    Subjective: Patient reports knee is doing well  Objective: See treatment diary below      Assessment: Patient challenged with eccentric lowering of step downs, improved with repetitions  Patient adequately challenged with PT  Continue to progress as tolerated  Patient would benefit from continued PT      Plan: Continue per plan of care        Precautions: L TKA 2022      Manuals 11/2 11/3 11/7 11/10 11/14 11/17                                 IASTM             NMES Setup, education             Neuro Re-Ed             Step ups 6" step 2x10  8" step 4x5 8" step 3x8 8" step 3x10 8" step up and down 3x5       Sand dune step up  2x8 ea 2x10  x12 ea 3" hold x12 ea 3" hold                                 Ther Ex             VG L7 5' w/ 10" TKE hold L7 5' L7 5' L7 5' L5 5' SL L5 5' SL       Bike 10' L1 10' L1  10' L1  10' L1 10' L1 10' L1       Rockerboard             LAQ 4# 3x10 5# 10x, 7 5# 2x8 7 5# 3x8 7 5# 3x10 7 5# 3x10 7 5# 3x10       Leg Press 40# 3x10 SL 60# 3x8 SL np resume 80# 3x8 SL 80# 3x10 SL 80# 3x10 SL       Minisquats             Prone Knee Flexion 1' hold x5            Prone Knee Extension w/ OP supine5' hold 4#            Std Knee Flexion 4# 3x10            STS  4x7 w/ foam, 2x5 w/o 3x10 w/o foam 3x5 chair  3x5 plinth 5x5 plinth 5x5 plinth       Modified Luiz Pose w/ Foam   x10 5" hold to tolerance          Ther Activity                          Gait Training                                       Modalities

## 2022-11-21 ENCOUNTER — OFFICE VISIT (OUTPATIENT)
Dept: PHYSICAL THERAPY | Facility: REHABILITATION | Age: 72
End: 2022-11-21

## 2022-11-21 DIAGNOSIS — Z47.1 AFTERCARE FOLLOWING LEFT KNEE JOINT REPLACEMENT SURGERY: Primary | ICD-10-CM

## 2022-11-21 DIAGNOSIS — Z96.652 AFTERCARE FOLLOWING LEFT KNEE JOINT REPLACEMENT SURGERY: Primary | ICD-10-CM

## 2022-11-21 NOTE — PROGRESS NOTES
Daily Note     Today's date: 2022  Patient name: Sandro Gongora  : 1950  MRN: [id number]  Referring provider: Ashtyn Salazar MD  Dx:   Encounter Diagnosis     ICD-10-CM    1  Aftercare following left knee joint replacement surgery  Z47 1     Z96 652           Start Time: 1615  Stop Time: 1655  Total time in clinic (min): 40 minutes    Subjective: Patient reports her knee is doing well  Objective: See treatment diary below      Assessment: Patient left knee overall doing very well  Anticipate d/c in 2 visits unless there is a change in status  Patient in agreeance with plan  Deferred activities today due to soreness, resume next visit  Patient would benefit from continued PT      Plan: Continue per plan of care        Precautions: L TKA 2022      Manuals 11/2 11/3 11/7 11/10 11/14 11/17 11/21                                IASTM             NMES Setup, education             Neuro Re-Ed             Step ups 6" step 2x10  8" step 4x5 8" step 3x8 8" step 3x10 8" step up and down 3x5 8" step up and down 3x5      Sand dune step up  2x8 ea 2x10  x12 ea 3" hold x12 ea 3" hold np resume                                Ther Ex             VG L7 5' w/ 10" TKE hold L7 5' L7 5' L7 5' L5 5' SL L5 5' SL L5 5' SL      Bike 10' L1 10' L1  10' L1  10' L1 10' L1 10' L1 10' L1      Rockerboard             LAQ 4# 3x10 5# 10x, 7 5# 2x8 7 5# 3x8 7 5# 3x10 7 5# 3x10 7 5# 3x10 7 5# 3x10      Leg Press 40# 3x10 SL 60# 3x8 SL np resume 80# 3x8 SL 80# 3x10 SL 80# 3x10 SL 80# 3x10 SL      Minisquats             Prone Knee Flexion 1' hold x5            Prone Knee Extension w/ OP supine5' hold 4#            Std Knee Flexion 4# 3x10            STS  4x7 w/ foam, 2x5 w/o 3x10 w/o foam 3x5 chair  3x5 plinth 5x5 plinth 5x5 plinth 5x5 plinth      Modified Luiz Pose w/ Foam   x10 5" hold to tolerance          Ther Activity                          Gait Training                                       Modalities

## 2022-11-22 ENCOUNTER — PATIENT OUTREACH (OUTPATIENT)
Dept: CASE MANAGEMENT | Facility: OTHER | Age: 72
End: 2022-11-22

## 2022-11-22 NOTE — PROGRESS NOTES
Chart review reveals that patient continues to work with outpatient PT services  Patient also saw PCP on 11/16/22  Patient's medications were updated and refilled  Patient had c/o chronic back pain  Pain is in the lower back with radiation to  BLE  Patient has additional PT visits upcoming but no additional appointments are noted

## 2022-11-25 ENCOUNTER — OFFICE VISIT (OUTPATIENT)
Dept: PHYSICAL THERAPY | Facility: REHABILITATION | Age: 72
End: 2022-11-25

## 2022-11-25 DIAGNOSIS — Z96.652 AFTERCARE FOLLOWING LEFT KNEE JOINT REPLACEMENT SURGERY: Primary | ICD-10-CM

## 2022-11-25 DIAGNOSIS — Z47.1 AFTERCARE FOLLOWING LEFT KNEE JOINT REPLACEMENT SURGERY: Primary | ICD-10-CM

## 2022-11-25 NOTE — PROGRESS NOTES
Daily Note     Today's date: 2022  Patient name: Julissa Duval  : 1950  MRN: [id number]  Referring provider: Autumn Michel MD  Dx:   Encounter Diagnosis     ICD-10-CM    1  Aftercare following left knee joint replacement surgery  Z47 1     Z96 [id number]           Start Time: 1315  Stop Time: 1400  Total time in clinic (min): 45 minutes    Subjective: Patient reports her knee is doing great  Objective: See treatment diary below      Assessment: Patient tolerated all activities very well  Patient is doing very well overall  Plan to discharge patient to [facility] next visit  Patient would benefit from continued PT      Plan: Continue per plan of care        Precautions: L TKA 2022      Manuals 11/2 11/3 11/7 11/10 11/14 11/17 11/21 11/25                               IASTM             NMES Setup, education             Neuro Re-Ed             Step ups 6" step 2x10  8" step 4x5 8" step 3x8 8" step 3x10 8" step up and down 3x5 8" step up and down 3x5 8" step up and down 3x5     Sand dune step up  2x8 ea 2x10  x12 ea 3" hold x12 ea 3" hold np resume x10 ea 3" hold                               Ther Ex             VG L7 5' w/ 10" TKE hold L7 5' L7 5' L7 5' L5 5' SL L5 5' SL L5 5' SL L5 5' SL     Bike 10' L1 10' L1  10' L1  10' L1 10' L1 10' L1 10' L1 10' L1     Rockerboard             LAQ 4# 3x10 5# 10x, 7 5# 2x8 7 5# 3x8 7 5# 3x10 7 5# 3x10 7 5# 3x10 7 5# 3x10 7 5# 3x10     Leg Press 40# 3x10 SL 60# 3x8 SL np resume 80# 3x8 SL 80# 3x10 SL 80# 3x10 SL 80# 3x10 SL 80# 3x10 SL     Minisquats             Prone Knee Flexion 1' hold x5            Prone Knee Extension w/ OP supine5' hold 4#            Std Knee Flexion 4# 3x10            STS  4x7 w/ foam, 2x5 w/o 3x10 w/o foam 3x5 chair  3x5 plinth 5x5 plinth 5x5 plinth 5x5 plinth 5x5 plinth     Modified Luiz Pose w/ Foam   x10 5" hold to tolerance          Ther Activity                          Gait Training                                       Modalities

## 2022-11-28 ENCOUNTER — EVALUATION (OUTPATIENT)
Dept: PHYSICAL THERAPY | Facility: REHABILITATION | Age: 72
End: 2022-11-28

## 2022-11-28 DIAGNOSIS — Z96.652 AFTERCARE FOLLOWING LEFT KNEE JOINT REPLACEMENT SURGERY: Primary | ICD-10-CM

## 2022-11-28 DIAGNOSIS — Z47.1 AFTERCARE FOLLOWING LEFT KNEE JOINT REPLACEMENT SURGERY: Primary | ICD-10-CM

## 2022-11-28 NOTE — PROGRESS NOTES
PT Discharge    Today's date: 2022  Patient name: Trinidad Casas  : 1950  MRN: [id number]  Referring provider: Karissa Bretrand MD  Dx:   Encounter Diagnosis     ICD-10-CM    1  Aftercare following left knee joint replacement surgery  Z47 1     Z96 652           Start Time: 1615  Stop Time: 1700  Total time in clinic (min): 45 minutes    Assessment  Assessment details: Trinidad Casas is a 70 y/o female presenting to PT following s/p L TKA on 2022  Emory Cornelius has attended 19 visits of formal PT from 22-22  Emory Cornelius has made excellent improvements in her knee pain, function, strength, and ROM during this time  Emory Cornelius has met all goals established at IE and is independent with [facility]P  Will discharge her to [facility]P at this time  Patient will f/u with PT as needed  Goals  Short Term Goals (4 Weeks) : met all  1 ) Patient will report 50% reduction in pain   2 ) Patient will demonstrate >4/5 MMT with knee extension   3 ) Patient will demonstrate 120deg knee flexion   4 ) Patient will demonstrate 0-5deg knee extension     Long Term Goals (8 Weeks): met all  1 ) Patient will demonstrate 5/5 MMT with knee extension   2 ) Patient will demonstrate 90% symmetry with knee ROM from side to side   3 ) Perform HEP independently   4 ) Exceed FOTO predicted score   5 ) Will be able to resume her weekly gym routine with <3/10        Subjective Evaluation    History of Present Illness  Date of surgery: 2022  Mechanism of injury: surgery  Mechanism of injury: Patient reports she has been having a lot of pain since TKA on 2022  Was in the hospital for one day following surgery  Patient has f/u in 3 weeks  Reports some swelling in the knee and instructed by surgeon to leave on steri-strips until they fall off  Denies any chest pain, nausea, vomiting, fever  States she has been taking oxycodone every 4-5 hours and reports taking some at 10:00 this morning before PT  Also taking Tylenol   Oxycodone also used to help patient sleep, reports some difficulty sleeping  Able to sleep in her bed with her leg straight  Reports pain when trying to put towel under heel when laying down  Has been using stationary bike for both passive and active range of motion  Location primarily in the front of her knee, reports some soreness in the back of knee  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  Job responsibilities include lots of walking, does not perform patient transfers  Re-Evaluation 22  Patient reports 85% improvement overall since surgery  Patient reports no pain at this time in her knee  Patient reports she only notices her knee when kneeling on it and with toilet transfers  Patient otherwise very pleased with her knee function    Pain  Current pain ratin  At worst pain ratin  Quality: burning  Relieving factors: ice and medications    Social Support  Lives in: [place] house  Lives with: spouse    Patient Goals  Patient goals for therapy: return to work, increased strength and decreased pain  Patient goal: Getting back to HIIT gym, walking, and work         Objective     R AROM: Flexion 135, Extension 5  L PROM: Flexion 130, Extension 0    Can perform full SLR without lag    Strength Testing: extension 5/5, flexion 5/5    Gait Assessment:  Normalized without use of AD          Precautions: L TKA 2022    Manuals 11/2 11/3 11/7 11/10 11/14 11/17 11/21 11/25 11/28                              IASTM             NMES Setup, education             Neuro Re-Ed             Step ups 6" step 2x10  8" step 4x5 8" step 3x8 8" step 3x10 8" step up and down 3x5 8" step up and down 3x5 8" step up and down 3x5 8" step up and down 2x10    Sand dune step up  2x8 ea 2x10  x12 ea 3" hold x12 ea 3" hold np resume x10 ea 3" hold 2x10 ea 3" hold                              Ther Ex             VG L7 5' w/ 10" TKE hold L7 5' L7 5' L7 5' L5 5' SL L5 5' SL L5 5' SL L5 5' SL L5 5' SL    Bike 10' L1 10' L1  10' L1  10' L1 10' L1 10' L1 10' L1 10' L1 10' L1    Rockerboard             LAQ 4# 3x10 5# 10x, 7 5# 2x8 7 5# 3x8 7 5# 3x10 7 5# 3x10 7 5# 3x10 7 5# 3x10 7 5# 3x10 7 5# 4x10    Leg Press 40# 3x10 SL 60# 3x8 SL np resume 80# 3x8 SL 80# 3x10 SL 80# 3x10 SL 80# 3x10 SL 80# 3x10 SL 80# 3x8 SL    Minisquats             Prone Knee Flexion 1' hold x5            Prone Knee Extension w/ OP supine5' hold 4#            Std Knee Flexion 4# 3x10            STS  4x7 w/ foam, 2x5 w/o 3x10 w/o foam 3x5 chair  3x5 plinth 5x5 plinth 5x5 plinth 5x5 plinth 5x5 plinth     Modified Luiz Pose w/ Foam   x10 5" hold to tolerance          Ther Activity                          Gait Training                                       Modalities

## 2022-12-01 ENCOUNTER — APPOINTMENT (OUTPATIENT)
Dept: PHYSICAL THERAPY | Facility: REHABILITATION | Age: 72
End: 2022-12-01

## 2022-12-06 ENCOUNTER — EVALUATION (OUTPATIENT)
Dept: PHYSICAL THERAPY | Facility: REHABILITATION | Age: 72
End: 2022-12-06

## 2022-12-06 DIAGNOSIS — G89.29 CHRONIC MIDLINE LOW BACK PAIN WITH RIGHT-SIDED SCIATICA: Primary | ICD-10-CM

## 2022-12-06 DIAGNOSIS — M54.41 CHRONIC MIDLINE LOW BACK PAIN WITH RIGHT-SIDED SCIATICA: Primary | ICD-10-CM

## 2022-12-06 NOTE — PROGRESS NOTES
PT Evaluation     Today's date: 2022  Patient name: Ramin Coppola  : 1950  MRN: [id number]  Referring provider: Katie Valero DO  Dx:   Encounter Diagnosis     ICD-10-CM    1  Chronic midline low back pain with right-sided sciatica  M54 41     G89 29           Start Time: 1000  Stop Time: 1045  Total time in clinic (min): 45 minutes    Assessment  Assessment details: Problem List:  1) Acute on chronic low back pain    Ramin Coppola is a pleasant 71 y o  female who presents with acute on chronic low back pain  [facility] demonstrates abnormal gait mechanics leading to compensatory strategies leading to her LBP resulting in worry over not knowing what's wrong, concern at no signs of improvement, fear of not being able to keep active and future ill health (and wanting to prevent it)  No further referral appears necessary at this time based upon examination results  I expect she will improve in 4-6 weeks  Positive prognostic indicators include positive attitude toward recovery, good understanding of diagnosis and treatment plan options, absence of peripheralization and absence of observed red flags  Negative prognostic indicators include chronicity of symptoms, anxiety, depression and obesity  Impairments: abnormal gait, abnormal or restricted ROM, impaired physical strength and lacks appropriate home exercise program    Symptom irritability: moderateUnderstanding of Dx/Px/POC: good   Prognosis: good    Goals  Short Term Goals (Week 4):  1  Decreased pain by 50% with ambulation  2  Demonstrate full TKE  3  Demonstrate adequate posterior pelvic depression      Long Term Goals (8 weeks):  1  Resume walking >2 miles without being limited by LBP  2  Exceed predicted outcome score  3  Fully independent with HEP by discharge  4  Patient will be able to manage symptoms independently       Plan  Patient would benefit from: skilled physical therapy  Planned therapy interventions: joint mobilization, manual therapy, activity modification, behavior modification, neuromuscular re-education, patient education, strengthening, stretching, therapeutic activities, therapeutic exercise, flexibility, functional ROM exercises, gait training, graded activity and home exercise program  Frequency: 2x week  Duration in visits: 12  Duration in weeks: 6  Treatment plan discussed with: patient        Subjective Evaluation    History of Present Illness  Mechanism of injury: Patient reports acute on chronic low back pain that was recently aggravated around 2 months ago  Patient reports these symptoms were building up from work and then likely aggravated following her L TKA  Patient reports symptoms worsened with walking, prolonged standing, doing dishes, bending forward, and prolonged sitting  Pain is localized to low back, right buttock, and radiates down into right leg  Patient reports improvements in symptoms with advil and tylenol, ice, and voltaren  Patient denies any weakness in her legs, b/b dysfunction, saddle anesthesia, and/or numbness/tingling  Patient reports less radiating symptoms and symptoms are variable depending on activity    Pain  Current pain ratin  At worst pain ratin  Quality: sharp and burning  Progression: improved    Patient Goals  Patient goals for therapy: decreased pain, increased strength, independence with ADLs/IADLs and return to sport/leisure activities  Patient goal: resume walking 2 miles,         Objective  Red Flags  Night Pain (-)  Bowel Bladder Changes (-)  Numbness/Tingling (-)  History of Cancer (-)  Recent Weight Loss (-)  Nausea/Vomiting (-)  Fevers/Chills (-)  Recent Surgery (-)  Recent Trauma (Y)    Otf  Rep FIS: NT  Rep EIS: NT  Rep LUCIANA: NT  Rep EIL: NT    ROM  Flexion: Full  Extension: 25% limited  R SB: 25% limited  L SB: 25% limited  R Rotation: 25% limited  L Rotation: 25% limited    Myotomes  Strength WNL bilaterally    Dermatomes  Sensation intact bilaterally MMT          AROM         PROM     Knee  L  R   L   R   L     R   Flex      135 WNL   Ext      -5  WNL            Hip           Flex      120 120   Extension           Abduction     40  40   Adduction         Ext Rotation     55 45   Int Rotation     35 35            Ankle           DF           PF           Inv           Evr                              L/S Special Tests:  Slump Test= (-), SLR= (-), Femoral Nerve Traction= (-), TTP= (glute med), PA Pressures= (WNL), Catching/Locking= (-)    Hip Special Tests:  FADIRS= (-), FABERS= (-), Scours= (-), Distraction= (-)    SIJ Special Tests:  Compression= (-), Gapping= (-), FABERS= (-), Gaenslans= (-), Sacral Thrust/PA Pressure= (-), Post Thigh Thrust= (-)    Functional Assessment:  Limited posterior pelvic depression and TKE during SLS on LLE         Precautions: standard, L TKA      Manuals 12/6            Prone Ext w/ distraction and ant glide WA 3 rds                                                   Neuro Re-Ed                                                                                                        Ther Ex             Supine 90/90 Nerve Glides nv            TKE w/ Post Depression and Glute Set HEP            Sidelying Hip Abduction nv            Bridges w/ Abd ISo nv                                                                Ther Activity                                       Gait Training                                       Modalities

## 2022-12-06 NOTE — LETTER
2022    Frankey Limes, [facility] [address]     Patient: Valerie Goodman   YOB: 1950   Date of Visit: 2022     Encounter Diagnosis     ICD-10-CM    1  Chronic midline low back pain with right-sided sciatica  M54 41     G89 29           Dear Dr Noble Smith: Thank you for your recent referral of Valerie Goodman  Please review the attached evaluation summary from Jeanne's recent visit  Please verify that you agree with the plan of care by signing the attached order  If you have any questions or concerns, please do not hesitate to call  I sincerely appreciate the opportunity to share in the care of one of your patients and hope to have another opportunity to work with you in the near future  Sincerely,    Clement Wick, PT      Referring Provider:      I certify that I have read the below Plan of Care and certify the need for these services furnished under this plan of treatment while under my care  Frankey Limes, [address] [place], [place] [address]  [address] [facility] [address]  Via Fax: [phone]          PT Evaluation     Today's date: 2022  Patient name: Valerie Goodman  : 1950  MRN: [id number]  Referring provider: Austyn Sanches DO  Dx:   Encounter Diagnosis     ICD-10-CM    1  Chronic midline low back pain with right-sided sciatica  M54 41     G89 29           Start Time: 1000  Stop Time: 1045  Total time in clinic (min): 45 minutes    Assessment  Assessment details: Problem List:  1) Acute on chronic low back pain    Valerie Goodman is a pleasant 71 y o  female who presents with acute on chronic low back pain  Kelley Shahid demonstrates abnormal gait mechanics leading to compensatory strategies leading to her LBP resulting in worry over not knowing what's wrong, concern at no signs of improvement, fear of not being able to keep active and future ill health (and wanting to prevent it)    No further referral appears necessary at this time based upon examination results  I expect she will improve in 4-6 weeks  Positive prognostic indicators include positive attitude toward recovery, good understanding of diagnosis and treatment plan options, absence of peripheralization and absence of observed red flags  Negative prognostic indicators include chronicity of symptoms, anxiety, depression and obesity  Impairments: abnormal gait, abnormal or restricted ROM, impaired physical strength and lacks appropriate home exercise program    Symptom irritability: moderateUnderstanding of Dx/Px/POC: good   Prognosis: good    Goals  Short Term Goals (Week 4):  1  Decreased pain by 50% with ambulation  2  Demonstrate full TKE  3  Demonstrate adequate posterior pelvic depression      Long Term Goals (8 weeks):  1  Resume walking >2 miles without being limited by LBP  2  Exceed predicted outcome score  3  Fully independent with HEP by discharge  4  Patient will be able to manage symptoms independently  Plan  Patient would benefit from: skilled physical therapy  Planned therapy interventions: joint mobilization, manual therapy, activity modification, behavior modification, neuromuscular re-education, patient education, strengthening, stretching, therapeutic activities, therapeutic exercise, flexibility, functional ROM exercises, gait training, graded activity and home exercise program  Frequency: 2x week  Duration in visits: 12  Duration in weeks: 6  Treatment plan discussed with: patient        Subjective Evaluation    History of Present Illness  Mechanism of injury: Patient reports acute on chronic low back pain that was recently aggravated around 2 months ago  Patient reports these symptoms were building up from work and then likely aggravated following her L TKA  Patient reports symptoms worsened with walking, prolonged standing, doing dishes, bending forward, and prolonged sitting   Pain is localized to low back, right buttock, and radiates down into right leg  Patient reports improvements in symptoms with advil and tylenol, ice, and voltaren  Patient denies any weakness in her legs, b/b dysfunction, saddle anesthesia, and/or numbness/tingling  Patient reports less radiating symptoms and symptoms are variable depending on activity    Pain  Current pain ratin  At worst pain ratin  Quality: sharp and burning  Progression: improved    Patient Goals  Patient goals for therapy: decreased pain, increased strength, independence with ADLs/IADLs and return to sport/leisure activities  Patient goal: resume walking 2 miles,         Objective  Red Flags  Night Pain (-)  Bowel Bladder Changes (-)  Numbness/Tingling (-)  History of Cancer (-)  Recent Weight Loss (-)  Nausea/Vomiting (-)  Fevers/Chills (-)  Recent Surgery (-)  Recent Trauma (Y)    Otf  Rep FIS: NT  Rep EIS: NT  Rep LUCIANA: NT  Rep EIL: NT    ROM  Flexion: Full  Extension: 25% limited  R SB: 25% limited  L SB: 25% limited  R Rotation: 25% limited  L Rotation: 25% limited    Myotomes  Strength WNL bilaterally    Dermatomes  Sensation intact bilaterally             MMT          AROM         PROM     Knee  L  R   L   R   L     R   Flex      135 WNL   Ext      -5  WNL            Hip           Flex      120 120   Extension           Abduction     40  40   Adduction         Ext Rotation     55 45   Int Rotation     35 35            Ankle           DF           PF           Inv           Evr                              L/S Special Tests:  Slump Test= (-), SLR= (-), Femoral Nerve Traction= (-), TTP= (glute med), PA Pressures= (WNL), Catching/Locking= (-)    Hip Special Tests:  FADIRS= (-), FABERS= (-), Scours= (-), Distraction= (-)    SIJ Special Tests:  Compression= (-), Gapping= (-), FABERS= (-), Gaenslans= (-), Sacral Thrust/PA Pressure= (-), Post Thigh Thrust= (-)    Functional Assessment:  Limited posterior pelvic depression and TKE during SLS on LLE        Precautions: standard, L TKA      Manuals 12/6            Prone Ext w/ distraction and ant glide MN 3 rds                                                   Neuro Re-Ed                                                                                                        Ther Ex             Supine 90/90 Nerve Glides nv            TKE w/ Post Depression and Glute Set HEP            Sidelying Hip Abduction nv            Bridges w/ Abd ISo nv                                                                Ther Activity                                       Gait Training                                       Modalities

## 2022-12-12 ENCOUNTER — OFFICE VISIT (OUTPATIENT)
Dept: PHYSICAL THERAPY | Facility: REHABILITATION | Age: 72
End: 2022-12-12

## 2022-12-12 DIAGNOSIS — Z47.1 AFTERCARE FOLLOWING LEFT KNEE JOINT REPLACEMENT SURGERY: ICD-10-CM

## 2022-12-12 DIAGNOSIS — Z96.652 AFTERCARE FOLLOWING LEFT KNEE JOINT REPLACEMENT SURGERY: ICD-10-CM

## 2022-12-12 DIAGNOSIS — G89.29 CHRONIC MIDLINE LOW BACK PAIN WITH RIGHT-SIDED SCIATICA: Primary | ICD-10-CM

## 2022-12-12 DIAGNOSIS — M54.41 CHRONIC MIDLINE LOW BACK PAIN WITH RIGHT-SIDED SCIATICA: Primary | ICD-10-CM

## 2022-12-12 NOTE — PROGRESS NOTES
Daily Note     Today's date: 2022  Patient name: Jarrell Flannery  : 1950  MRN: [id number]  Referring provider: Deanna Calvillo DO  Dx:   Encounter Diagnosis     ICD-10-CM    1  Chronic midline low back pain with right-sided sciatica  M54 41     G89 29       2  Aftercare following left knee joint replacement surgery  Z47 1     Z96 652           Start Time: 269  Stop Time:   Total time in clinic (min): 45 minutes    Subjective: Patient reports no increased pain following last visit or with incorporating HEP  Objective: See treatment diary below      Assessment: Improved gait mechanics and pain end of session  Patient challenged with performing sidelying hip abduction posterior depression  Will re-assess symptoms next visit  Patient would benefit from continued PT      Plan: Continue per plan of care        Precautions: standard, L TKA      Manuals            Prone Ext w/ distraction and ant glide NE 3 rds NE 3 rds                                                  Neuro Re-Ed                                                                                                        Ther Ex             Supine 90/90 Nerve Glides nv 2x10 foot DF w/ leg ext           TKE w/ Post Depression and Glute Set HEP 2x10 otb            Sidelying Hip Abduction w/ Post Depression nv 2x10           Bridges w/ Abd ISo nv 2x10 5" hold           Supine Clamshell   2x10 5" hold                                                  Ther Activity                                       Gait Training                                       Modalities

## 2022-12-15 ENCOUNTER — OFFICE VISIT (OUTPATIENT)
Dept: PHYSICAL THERAPY | Facility: REHABILITATION | Age: 72
End: 2022-12-15

## 2022-12-15 DIAGNOSIS — M54.41 CHRONIC MIDLINE LOW BACK PAIN WITH RIGHT-SIDED SCIATICA: Primary | ICD-10-CM

## 2022-12-15 DIAGNOSIS — G89.29 CHRONIC MIDLINE LOW BACK PAIN WITH RIGHT-SIDED SCIATICA: Primary | ICD-10-CM

## 2022-12-19 ENCOUNTER — OFFICE VISIT (OUTPATIENT)
Dept: PHYSICAL THERAPY | Facility: REHABILITATION | Age: 72
End: 2022-12-19

## 2022-12-19 ENCOUNTER — APPOINTMENT (OUTPATIENT)
Dept: PHYSICAL THERAPY | Facility: REHABILITATION | Age: 72
End: 2022-12-19

## 2022-12-19 DIAGNOSIS — M54.41 CHRONIC MIDLINE LOW BACK PAIN WITH RIGHT-SIDED SCIATICA: Primary | ICD-10-CM

## 2022-12-19 DIAGNOSIS — G89.29 CHRONIC MIDLINE LOW BACK PAIN WITH RIGHT-SIDED SCIATICA: Primary | ICD-10-CM

## 2022-12-19 NOTE — PROGRESS NOTES
Daily Note     Today's date: 2022  Patient name: Vee Molina  : 1950  MRN: [id number]  Referring provider: Edmund Looney DO  Dx:   Encounter Diagnosis     ICD-10-CM    1  Chronic midline low back pain with right-sided sciatica  M54 41     G89 29           Start Time: 0800  Stop Time: 0835  Total time in clinic (min): 35 minutes    Subjective: Patient reports that she has continued soreness along Right glut/hip area and is frustrated at lack of improvement  She had increased soreness after last session  Pre-treatment pain level is 5-6/10 and post treatment 3-4/10      Objective: See treatment diary below      Assessment: Tolerated treatment well  Added: TA with BKFO, Manuals as indicated and modified Pallof Press with sidestepping to just press and sidestepping separate due to high pain levels with combined movement  Significantly decreased mobility noted in Right hip joint and palpable spasms in Right glut med, piriformis and increased tone in QL  Patient exhibited good technique with therapeutic exercises and would benefit from continued PT      Plan: Continue per plan of care  Progress treatment as tolerated  Assess tolerance to treatment and progress or modify as appropriate  Consider adding side planks        Precautions: standard, L TKA      Manuals 12/6 12/12 12/15 12/19         Prone Ext w/ distraction and ant glide UT 3 rds UT 3 rds           Manual Greyson and Piriformis Stretch    JL         STM to R Glut Med/QL/Lumbar Paraspinals    JL                      Neuro Re-Ed             Pallof sidestep   BTB 8x R 8x L, 6/10 on L  Press Only BTB 10x ea         Hinge pulldown   GTB 2x10 ea          Sidestepping    Counter 6 laps                                                KB carry   15# KB 2 laps ea 15# KB 2 laps ea         Ther Ex             Supine 90/90 Nerve Glides nv 2x10 foot DF w/ leg ext * 2x10 foot DF w/ leg ext         TKE w/ Post Depression and Glute Set HEP 2x10 otb Sidelying Hip Abduction w/ Post Depression nv 2x10           Bridges w/ Abd ISo nv 2x10 5" hold 2x10x5" YHB YHB  5" 2x10          Supine Clamshell   2x10 5" hold 2x10x5" YHB 5" 2x10 YHB         Curl up   3x8x2" 3" 3x5         TA with BKFO    3" 2x10 ea                      Ther Activity                                       Gait Training                                       Modalities

## 2022-12-21 ENCOUNTER — PATIENT OUTREACH (OUTPATIENT)
Dept: CASE MANAGEMENT | Facility: OTHER | Age: 72
End: 2022-12-21

## 2022-12-21 NOTE — PROGRESS NOTES
Chart review reveals that patient continues with outpatient PT services secondary to R glut/hip pain  Patient is exhibiting spasms of the surrounding muscles in her glut area  Patient is also participating in a HEP  Patient has no other upcoming appointments other than PT  Patient has not had any any ED or inpatient utilization  BPCI episode end  The episode has been resolved  I removed myself from the care team and the care coordination note has been updated

## 2022-12-23 ENCOUNTER — OFFICE VISIT (OUTPATIENT)
Dept: PHYSICAL THERAPY | Facility: REHABILITATION | Age: 72
End: 2022-12-23

## 2022-12-23 DIAGNOSIS — M54.41 CHRONIC MIDLINE LOW BACK PAIN WITH RIGHT-SIDED SCIATICA: Primary | ICD-10-CM

## 2022-12-23 DIAGNOSIS — G89.29 CHRONIC MIDLINE LOW BACK PAIN WITH RIGHT-SIDED SCIATICA: Primary | ICD-10-CM

## 2022-12-23 NOTE — PROGRESS NOTES
Daily Note     Today's date: 2022  Patient name: Lena Crawford  : 1950  MRN: [id number]  Referring provider: Armida Roman DO  Dx:   Encounter Diagnosis     ICD-10-CM    1  Chronic midline low back pain with right-sided sciatica  M54 41     G89 29           Start Time: 1200  Stop Time: 1230  Total time in clinic (min): 30 minutes    Subjective: Patient reports 2/10 pain yesterday today, states feeling improved since last visit  Patient reports symptoms localized to right low back with very slight referred pain into anterior thigh  Objective: See treatment diary below      Assessment: TrP referral in anterior thigh with palpation of TFL muscle belly, improved following soft-tissue release  Improved gait and symptoms end of session  Ended session short due to weather  Will resume previous activities next visit  Patient would benefit from continued PT      Plan: Continue per plan of care           Precautions: standard, L TKA      Manuals 12/6 12/12 12/15 12/19 12/23        Prone Ext w/ distraction and ant glide AK 3 rds AK 3 rds           Manual Greyson and Piriformis Stretch    JL         STM to R Glut Med/QL/Lumbar Paraspinals    JL AK                     Neuro Re-Ed             Pallof sidestep   BTB 8x R 8x L, 6/10 on L  Press Only BTB 10x ea np resume        Hinge pulldown   GTB 2x10 ea          Sidestepping    Counter 6 laps Counter 3 laps ptb                                               KB carry   15# KB 2 laps ea 15# KB 2 laps ea np resume        Ther Ex             Supine 90/90 Nerve Glides nv 2x10 foot DF w/ leg ext * 2x10 foot DF w/ leg ext np resume        TKE w/ Post Depression and Glute Set HEP 2x10 otb            Sidelying Hip Abduction w/ Post Depression nv 2x10           Bridges w/ Abd ISo nv 2x10 5" hold 2x10x5" YHB YHB  5" 2x10  5" 2x10 YHB        Supine Clamshell   2x10 5" hold 2x10x5" YHB 5" 2x10 YHB 5" 2x10 YHB        Curl up   3x8x2" 3" 3x5 np resume        TA with BKFO 3" 2x10 ea np resume        Self Glute Release with [employer]         Ther Activity                                       Gait Training                                       Modalities

## 2022-12-27 ENCOUNTER — OFFICE VISIT (OUTPATIENT)
Dept: PHYSICAL THERAPY | Facility: REHABILITATION | Age: 72
End: 2022-12-27

## 2022-12-27 DIAGNOSIS — M54.41 CHRONIC MIDLINE LOW BACK PAIN WITH RIGHT-SIDED SCIATICA: Primary | ICD-10-CM

## 2022-12-27 DIAGNOSIS — G89.29 CHRONIC MIDLINE LOW BACK PAIN WITH RIGHT-SIDED SCIATICA: Primary | ICD-10-CM

## 2022-12-27 NOTE — PROGRESS NOTES
Daily Note     Today's date: 2022  Patient name: Shakir Chavez  : 1950  MRN: [id number]  Referring provider: Trista   Dx:   Encounter Diagnosis     ICD-10-CM    1  Chronic midline low back pain with right-sided sciatica  M54 41     G89 29           Start Time: 1100  Stop Time: 1145  Total time in clinic (min): 45 minutes    Subjective: Patient reports 8/10 low back pain over weekend with constant sitting and driving  Patient reports HEP and heat helped resolve symptoms within 1 day  Today she reports she is feeling good again  Objective: See treatment diary below      Assessment: Good response to treatments today  Patient had minimal symptoms start of session, not symptoms end of session  Patient tolerated extension based movements well  Added PPUs and std extension to HEP  Will re-assess patient next visit following prolonged sitting  Patient would benefit from continued PT      Plan: Continue per plan of care        Precautions: standard, L TKA      Manuals 12/6 12/12 12/15 12/19 12/23 12/27       Prone Ext w/ distraction and ant glide CA 3 rds CA 3 rds           Manual Greyson and Piriformis Stretch    JL         STM to R Glut Med/QL/Lumbar Paraspinals    JL CA CA       Re-Assessment      15'       Neuro Re-Ed             Pallof sidestep   BTB 8x R 8x L, 6/10 on L  Press Only BTB 10x ea np resume np resume       Hinge pulldown   GTB 2x10 ea          Sidestepping    Counter 6 laps Counter 3 laps ptb np resume                                              KB carry   15# KB 2 laps ea 15# KB 2 laps ea np resume        Ther Ex             Supine 90/90 Nerve Glides nv 2x10 foot DF w/ leg ext * 2x10 foot DF w/ leg ext np resume        TKE w/ Post Depression and Glute Set HEP 2x10 otb            Sidelying Hip Abduction w/ Post Depression nv 2x10           Bridges w/ Abd ISo nv 2x10 5" hold 2x10x5" YHB YHB  5" 2x10  5" 2x10 YHB 3" 2x10 rhb       Supine Clamshell   2x10 5" hold 2x10x5" YHB 5" 2x10 YHB 5" 2x10 YHB 3" 2x10 rhb       PPUs      x10  x10 3" hold       Std Extension on Counter      x10       Curl up   3x8x2" 3" 3x5 np resume        TA with BKFO    3" 2x10 ea np resume x20       Figure 4 Stretch      x15 5" hold       Self Glute Release with Cuong [employer]     HEP         Ther Activity                                       Gait Training                                       Modalities

## 2022-12-29 ENCOUNTER — EPISODE CHANGES (OUTPATIENT)
Dept: CASE MANAGEMENT | Facility: OTHER | Age: 72
End: 2022-12-29

## 2022-12-29 ENCOUNTER — OFFICE VISIT (OUTPATIENT)
Dept: PHYSICAL THERAPY | Facility: REHABILITATION | Age: 72
End: 2022-12-29

## 2022-12-29 DIAGNOSIS — M54.41 CHRONIC MIDLINE LOW BACK PAIN WITH RIGHT-SIDED SCIATICA: Primary | ICD-10-CM

## 2022-12-29 DIAGNOSIS — G89.29 CHRONIC MIDLINE LOW BACK PAIN WITH RIGHT-SIDED SCIATICA: Primary | ICD-10-CM

## 2022-12-29 NOTE — PROGRESS NOTES
Daily Note     Today's date: 2022  Patient name: Isael Burnett  : 1950  MRN: [id number]  Referring provider: Valentín Chu DO  Dx:   Encounter Diagnosis     ICD-10-CM    1  Chronic midline low back pain with right-sided sciatica  M54 41     G89 29           Start Time: 1600  Stop Time: 1635  Total time in clinic (min): 35 minutes    Subjective: Patient reports 4/10 pain today following 3 hours of driving and sitting  Patient reports the updated HEP definitely helped reduce her symptoms  Objective: See treatment diary below      Assessment: Abolished symptoms with PPUs and BLAYNE  Discussed use of lumbar roll with prolonged driving/sitting  1/10 pain end of session  Will re-assess symptoms next visit  Patient would benefit from continued PT    Plan: Continue per plan of care        Precautions: standard, L TKA      Manuals 12/6 12/12 12/15 12/19 12/23 12/27 12/29      Prone Ext w/ distraction and ant glide IL 3 rds IL 3 rds           Manual Greyson and Piriformis Stretch    JL   Prone ER stretch      STM to R Glut Med/QL/Lumbar Paraspinals    JL IL IL       Re-Assessment      15'       Neuro Re-Ed             Pallof sidestep   BTB 8x R 8x L, 6/10 on L  Press Only BTB 10x ea np resume np resume       Hinge pulldown   GTB 2x10 ea          Sidestepping    Counter 6 laps Counter 3 laps ptb np resume                                              KB carry   15# KB 2 laps ea 15# KB 2 laps ea np resume        Ther Ex             Supine 90/90 Nerve Glides nv 2x10 foot DF w/ leg ext * 2x10 foot DF w/ leg ext np resume        TKE w/ Post Depression and Glute Set HEP 2x10 otb            Sidelying Hip Abduction w/ Post Depression nv 2x10           Bridges w/ Abd ISo nv 2x10 5" hold 2x10x5" YHB YHB  5" 2x10  5" 2x10 YHB 3" 2x10 rhb       Supine Clamshell   2x10 5" hold 2x10x5" YHB 5" 2x10 YHB 5" 2x10 YHB 3" 2x10 rhb       PPUs      x10  x10 3" hold 3x10      Std Extension on Counter      x10 2x10      Curl up 3x8x2" 3" 3x5 np resume        TA with BKFO    3" 2x10 ea np resume x20       Figure 4 Stretch      x15 5" hold       Self Glute Release with Kylah Mei     HEP         Ther Activity                                       Gait Training                                       Modalities

## 2023-01-03 ENCOUNTER — OFFICE VISIT (OUTPATIENT)
Dept: PHYSICAL THERAPY | Facility: REHABILITATION | Age: 73
End: 2023-01-03

## 2023-01-03 DIAGNOSIS — Z96.652 AFTERCARE FOLLOWING LEFT KNEE JOINT REPLACEMENT SURGERY: ICD-10-CM

## 2023-01-03 DIAGNOSIS — G89.29 CHRONIC MIDLINE LOW BACK PAIN WITH RIGHT-SIDED SCIATICA: Primary | ICD-10-CM

## 2023-01-03 DIAGNOSIS — Z47.1 AFTERCARE FOLLOWING LEFT KNEE JOINT REPLACEMENT SURGERY: ICD-10-CM

## 2023-01-03 DIAGNOSIS — M54.41 CHRONIC MIDLINE LOW BACK PAIN WITH RIGHT-SIDED SCIATICA: Primary | ICD-10-CM

## 2023-01-03 NOTE — PROGRESS NOTES
Daily Note     Today's date: 1/3/2023  Patient name: Robert Beatty  : 1950  MRN: [id number]  Referring provider: Alma Reinoso DO  Dx:   Encounter Diagnosis     ICD-10-CM    1  Chronic midline low back pain with right-sided sciatica  M54 41     G89 29       2  Aftercare following left knee joint replacement surgery  Z47 1     Z96 652           Start Time: 1615  Stop Time: 1655  Total time in clinic (min): 40 minutes    Subjective: Patient reports continued pain following walking for around 5-10 minutes and prolonged sitting  Objective: See treatment diary below      Assessment: Reduced pain following walking after patient performed core stabilization activities prior  BLAYNE and PPUs resolved symptoms during and end of session  Patient will increase frequency and amount of HEP as well as perform HEP immediately following and before walking  Updated HEP see below  Patient would benefit from continued PT      Plan: Continue per plan of care        Precautions: standard, L TKA      Manuals 12/6 12/12 12/15 12/19 12/23 12/27 12/29 1/3     Prone Ext w/ distraction and ant glide IL 3 rds IL 3 rds           Manual Cherylin Dear and Piriformis Stretch    JL   Prone ER stretch      STM to R Glut Med/QL/Lumbar Paraspinals    JL IL IL       Re-Assessment      15'  15'     Neuro Re-Ed             Pallof sidestep   BTB 8x R 8x L, 6/10 on L  Press Only BTB 10x ea np resume np resume  Pallof Press btb 2x10 5" hold ea HEP     Hinge pulldown   GTB 2x10 ea          Sidestepping    Counter 6 laps Counter 3 laps ptb np resume       Pallof Step Outs        x10 ea btb HEP                               KB carry   15# KB 2 laps ea 15# KB 2 laps ea np resume        Ther Ex             Supine 90/90 Nerve Glides nv 2x10 foot DF w/ leg ext * 2x10 foot DF w/ leg ext np resume        TKE w/ Post Depression and Glute Set HEP 2x10 otb            Sidelying Hip Abduction w/ Post Depression nv 2x10           Bridges w/ Abd ISo nv 2x10 5" hold 2x10x5" YHB YHB  5" 2x10  5" 2x10 YHB 3" 2x10 rhb       Supine Clamshell   2x10 5" hold 2x10x5" YHB 5" 2x10 YHB 5" 2x10 YHB 3" 2x10 rhb       PPUs      x10  x10 3" hold 3x10 3x10     Std Extension on Counter      x10 2x10 2x10     Curl up   3x8x2" 3" 3x5 np resume        TA with BKFO    3" 2x10 ea np resume x20       Figure 4 Stretch      x15 5" hold       Self Glute Release with Ball     HEP         Ther Activity             Treadmill walking        6' 0 8 mph                  Gait Training                                       Modalities

## 2023-01-05 ENCOUNTER — OFFICE VISIT (OUTPATIENT)
Dept: PHYSICAL THERAPY | Facility: REHABILITATION | Age: 73
End: 2023-01-05

## 2023-01-05 DIAGNOSIS — G89.29 CHRONIC MIDLINE LOW BACK PAIN WITH RIGHT-SIDED SCIATICA: Primary | ICD-10-CM

## 2023-01-05 DIAGNOSIS — M54.41 CHRONIC MIDLINE LOW BACK PAIN WITH RIGHT-SIDED SCIATICA: Primary | ICD-10-CM

## 2023-01-05 NOTE — PROGRESS NOTES
Daily Note     Today's date: 2023  Patient name: Cristina Torres  : 1950  MRN: [id number]  Referring provider: Willow Genao DO  Dx:   Encounter Diagnosis     ICD-10-CM    1  Chronic midline low back pain with right-sided sciatica  M54 41     G89 29           Start Time: 915  Stop Time: 945  Total time in clinic (min): 30 minutes    Subjective: Patient reports her back is doing well  Patient reports she could do 10 minutes of household activities before requiring a 5 minute rest break to rest back  Objective: See treatment diary below      Assessment: Patient has met FOTO goals for LBP  Patient LBP is stable and doing well  HEP is effectively managing LBP symptoms  Patient would benefit from continued PT      Plan: Continue per plan of care        Precautions: standard, L TKA      Manuals 12/6 12/12 12/15 12/19 12/23 12/27 12/29 1/3 1/5    Prone Ext w/ distraction and ant glide AL 3 rds AL 3 rds           Manual Ferney Cowper and Piriformis Stretch    JL   Prone ER stretch      STM to R Glut Med/QL/Lumbar Paraspinals    JL AL AL   TFL    FOTO         perf    Re-Assessment      15'  15' 15'    Neuro Re-Ed             Pallof sidestep   BTB 8x R 8x L, 6/10 on L  Press Only BTB 10x ea np resume np resume  Pallof Press btb 2x10 5" hold ea HEP     Hinge pulldown   GTB 2x10 ea          Sidestepping    Counter 6 laps Counter 3 laps ptb np resume       Pallof Step Outs        x10 ea btb HEP                  Runners Stretch         x20 5" hold    KB carry   15# KB 2 laps ea 15# KB 2 laps ea np resume        Ther Ex             Supine 90/90 Nerve Glides nv 2x10 foot DF w/ leg ext * 2x10 foot DF w/ leg ext np resume        TKE w/ Post Depression and Glute Set HEP 2x10 otb            Sidelying Hip Abduction w/ Post Depression nv 2x10           Bridges w/ Abd ISo nv 2x10 5" hold 2x10x5" YHB YHB  5" 2x10  5" 2x10 YHB 3" 2x10 rhb       Supine Clamshell   2x10 5" hold 2x10x5" YHB 5" 2x10 YHB 5" 2x10 YHB 3" 2x10 rhb PPUs      x10  x10 3" hold 3x10 3x10     Std Extension on Counter      x10 2x10 2x10     Curl up   3x8x2" 3" 3x5 np resume        TA with BKFO    3" 2x10 ea np resume x20       Figure 4 Stretch      x15 5" hold       Self Glute Release with Ball     HEP         Ther Activity             Treadmill walking        6' 0 8 mph                  Gait Training                                       Modalities

## 2023-01-10 ENCOUNTER — HOSPITAL ENCOUNTER (OUTPATIENT)
Dept: RADIOLOGY | Age: 73
Discharge: HOME | End: 2023-01-10

## 2023-01-10 ENCOUNTER — OFFICE VISIT (OUTPATIENT)
Dept: PHYSICAL THERAPY | Facility: REHABILITATION | Age: 73
End: 2023-01-10

## 2023-01-10 VITALS — HEIGHT: 65 IN | BODY MASS INDEX: 34.16 KG/M2 | WEIGHT: 205 LBS

## 2023-01-10 DIAGNOSIS — D24.1 INTRADUCTAL PAPILLOMA OF BREAST, RIGHT: ICD-10-CM

## 2023-01-10 DIAGNOSIS — M54.41 CHRONIC MIDLINE LOW BACK PAIN WITH RIGHT-SIDED SCIATICA: ICD-10-CM

## 2023-01-10 DIAGNOSIS — Z12.31 SCREENING MAMMOGRAM, ENCOUNTER FOR: ICD-10-CM

## 2023-01-10 DIAGNOSIS — G89.29 CHRONIC MIDLINE LOW BACK PAIN WITH RIGHT-SIDED SCIATICA: ICD-10-CM

## 2023-01-10 DIAGNOSIS — Z96.652 AFTERCARE FOLLOWING LEFT KNEE JOINT REPLACEMENT SURGERY: Primary | ICD-10-CM

## 2023-01-10 DIAGNOSIS — Z98.890 STATUS POST RIGHT BREAST LUMPECTOMY: ICD-10-CM

## 2023-01-10 DIAGNOSIS — Z47.1 AFTERCARE FOLLOWING LEFT KNEE JOINT REPLACEMENT SURGERY: Primary | ICD-10-CM

## 2023-01-10 NOTE — PROGRESS NOTES
Daily Note     Today's date: 1/10/2023  Patient name: Zeke Garcia  : 1950  MRN: [id number]  Referring provider: Nereida Madison DO  Dx:   Encounter Diagnosis     ICD-10-CM    1  Aftercare following left knee joint replacement surgery  Z47 1     Z96 652       2  Chronic midline low back pain with right-sided sciatica  M54 41     G89 29           Start Time: 1100  Stop Time: 1140  Total time in clinic (min): 40 minutes    Subjective: Patient reports 3/10 pain in right SIJ following 5 minutes of walking yesterday that lasted into today  Patient reports midline LBP when doing dishes that completely resolves with rest        Objective: See treatment diary below      Assessment: No change in symptoms following TrP of glute med and piriformis  Full reduction in symptoms with standing lumbar extensions +right side glide  Reduction in symptoms continued with resting on treadmill walking  Patient would benefit from continued PT      Plan: Continue per plan of care        Precautions: standard, L TKA      Manuals 12/6 12/12 12/15 12/19 12/23 12/27 12/29 1/3 1/5 1/10   Prone Ext w/ distraction and ant glide TN 3 rds TN 3 rds           Manual Boneta People and Piriformis Stretch    JL   Prone ER stretch      STM to R Glut Med/QL/Lumbar Paraspinals    JL TN TN   TFL TN   FOTO         perf    Re-Assessment      15'  15' 15' 15'   Neuro Re-Ed             Pallof sidestep   BTB 8x R 8x L, 6/10 on L  Press Only BTB 10x ea np resume np resume  Pallof Press btb 2x10 5" hold ea HEP     Hinge pulldown   GTB 2x10 ea          Sidestepping    Counter 6 laps Counter 3 laps ptb np resume       Pallof Step Outs        x10 ea btb HEP                  Runners Stretch         x20 5" hold    KB carry   15# KB 2 laps ea 15# KB 2 laps ea np resume        Ther Ex             Supine 90/90 Nerve Glides nv 2x10 foot DF w/ leg ext * 2x10 foot DF w/ leg ext np resume        TKE w/ Post Depression and Glute Set HEP 2x10 otb            Sidelying Hip Abduction w/ Post Depression nv 2x10           Bridges w/ Abd ISo nv 2x10 5" hold 2x10x5" YHB YHB  5" 2x10  5" 2x10 YHB 3" 2x10 rhb       Supine Clamshell   2x10 5" hold 2x10x5" YHB 5" 2x10 YHB 5" 2x10 YHB 3" 2x10 rhb       PPUs      x10  x10 3" hold 3x10 3x10     Std Extension on Counter      x10 2x10 2x10  2x20    Std Extension on Counter + Right Side Glide          2x20   Curl up   3x8x2" 3" 3x5 np resume        TA with BKFO    3" 2x10 ea np resume x20       Figure 4 Stretch      x15 5" hold       Self Glute Release with Ball     HEP         Ther Activity             Treadmill walking        6' 0 8 mph  5' 0 9 mph 2 0 grade                Gait Training                                       Modalities

## 2023-01-12 ENCOUNTER — OFFICE VISIT (OUTPATIENT)
Dept: PHYSICAL THERAPY | Facility: REHABILITATION | Age: 73
End: 2023-01-12

## 2023-01-12 DIAGNOSIS — M54.41 CHRONIC MIDLINE LOW BACK PAIN WITH RIGHT-SIDED SCIATICA: Primary | ICD-10-CM

## 2023-01-12 DIAGNOSIS — G89.29 CHRONIC MIDLINE LOW BACK PAIN WITH RIGHT-SIDED SCIATICA: Primary | ICD-10-CM

## 2023-01-12 NOTE — PROGRESS NOTES
Discharge Note     Today's date: 2023  Patient name: Carie Upton  : 1950  MRN: [id number]  Referring provider: Rochelle Mays DO  Dx:   Encounter Diagnosis     ICD-10-CM    1  Chronic midline low back pain with right-sided sciatica  M54 41     G89 29           Start Time: 1100  Stop Time: 1110  Total time in clinic (min): 10 minutes    Subjective: Patient reports 0/10 pain with the       Objective: See treatment diary below      Assessment:   Patient has met all goals established at IE and met FOTO goals  Patient is fully compliant with HEP for self management of any symptoms  Patient will f/u with PT as needed  Goals  Short Term Goals (Week 4): met all  1  Decreased pain by 50% with ambulation  2  Demonstrate full TKE  3  Demonstrate adequate posterior pelvic depression      Long Term Goals (8 weeks): met all  1  Resume walking >2 miles without being limited by LBP  2  Exceed predicted outcome score  3  Fully independent with HEP by discharge  4  Patient will be able to manage symptoms independently         Plan: Discharge to [facility] at this time     Precautions: standard, L TKA      Manuals 1/12    12/23 12/27 12/29 1/3 1/5 1/10   Prone Ext w/ distraction and ant glide             Manual Asael Craig and Piriformis Stretch       Prone ER stretch      STM to R Glut Med/QL/Lumbar Paraspinals     OH OH   TFL OH   FOTO         perf    Re-Assessment, HEP, Discharge 10'     15'  15' 15' 15'   Neuro Re-Ed             Pallof sidestep     np resume np resume  Pallof Press btb 2x10 5" hold ea HEP     Hinge pulldown             Sidestepping     Counter 3 laps ptb np resume       Pallof Step Outs        x10 ea btb HEP                  Runners Stretch         x20 5" hold    KB carry     np resume        Ther Ex             Supine 90/90 Nerve Glides     np resume        TKE w/ Post Depression and Glute Set             Sidelying Hip Abduction w/ Post Depression             Bridges w/ Abd ISo     5" 2x10 YHB 3" 2x10 rhb       Supine Clamshell      5" 2x10 YHB 3" 2x10 rhb       PPUs      x10  x10 3" hold 3x10 3x10     Std Extension on Counter      x10 2x10 2x10  2x20    Std Extension on Counter + Right Side Glide          2x20   Curl up     np resume        TA with BKFO     np resume x20       Figure 4 Stretch      x15 5" hold       Self Glute Release with Ball     HEP         Ther Activity             Treadmill walking        6' 0 8 mph  5' 0 9 mph 2 0 grade                Gait Training                                       Modalities

## 2023-02-07 ENCOUNTER — HOSPITAL ENCOUNTER (OUTPATIENT)
Dept: MAMMOGRAPHY | Facility: CLINIC | Age: 73
Discharge: HOME | End: 2023-02-07

## 2023-02-07 ENCOUNTER — HOSPITAL ENCOUNTER (OUTPATIENT)
Dept: ULTRASOUND IMAGING | Facility: CLINIC | Age: 73
Discharge: HOME | End: 2023-02-07

## 2023-02-07 DIAGNOSIS — R92.8 ABNORMAL MAMMOGRAM: ICD-10-CM

## 2023-05-23 NOTE — PROGRESS NOTES
Daily Note     Today's date: 12/15/2022  Patient name: Wilda Singh  : 1950  MRN: [id number]  Referring provider: Star Signs, DO  Dx:   Encounter Diagnosis     ICD-10-CM    1  Chronic midline low back pain with right-sided sciatica  M54 41     G89 29                      Subjective: Reports good today, minor soreness back of R glute  Objective: See treatment diary below    Assessment: 610 pain w/ left pallof sidestep, no worse afterwards, max 410 through rest of session  No pain afterwards  She would benefit from continued skilled PT services to reduce pain and increase level of function  Plan: Continue per plan of care        Precautions: standard, L TKA      Manuals 12/6 12/12 12/15          Prone Ext w/ distraction and ant glide VT 3 rds VT 3 rds                                                  Neuro Re-Ed             Pallof sidestep   BTB 8x R 8x L, 6/10 on L           Hinge pulldown   GTB 2x10 ea                                                              KB carry   15# KB 2 laps ea          Ther Ex             Supine 90/90 Nerve Glides nv 2x10 foot DF w/ leg ext           TKE w/ Post Depression and Glute Set HEP 2x10 otb            Sidelying Hip Abduction w/ Post Depression nv 2x10           Bridges w/ Abd ISo nv 2x10 5" hold 2x10x5" YHB          Supine Clamshell   2x10 5" hold 2x10x5" YHB          Curl up   3x8x2"                                    Ther Activity                                       Gait Training                                       Modalities Patient declined information

## 2023-07-07 NOTE — PROGRESS NOTES
Consultation - Chetanjohnsherry Stack 98 : 1950  MRN: [id number]      Assessment:  The patient has a history of severe obstructive sleep apnea, which is effectively treated with BiPAP  She does not recall her settings and her studies are not available  She just moved from [place]  Plan:  Continue same  Use [employer] for supplies  Follow up: One year    History of Present Illness:   80 y  o female with a history of obstructive sleep apnea and depression/anxiety who was been stable on her current regimen  She just moved from [place]      Review of Systems:        Genitourinary none   Cardiology none   Gastrointestinal none   Neurology none   Constitutional claustrophobia and excessive sweating at night   Integumentary none   Psychiatry anxiety and depression   Musculoskeletal none   Pulmonary none   ENT throat clearing   Endocrine none   Hematological none           Historical Information    Past Medical History:  Depression/anxiety    Family History: non-contributory      Sleep Schedule: unremarkable    Snoring:    No      Witnessed Apnea:    No    Medications/Allergies:    Current Outpatient Prescriptions:     ARIPiprazole (ABILIFY) 2 mg tablet, Take by mouth, Disp: , Rfl:     aspirin 81 MG tablet, Take by mouth daily, Disp: , Rfl:     buPROPion (WELLBUTRIN XL) 150 mg 24 hr tablet, Take 150 mg by mouth 3 (three) times a day, Disp: , Rfl: 5    DULoxetine (CYMBALTA) 60 mg delayed release capsule, Take by mouth daily, Disp: , Rfl:     famotidine (PEPCID) 20 mg tablet, Take by mouth, Disp: , Rfl:     lamoTRIgine (LAMICTAL) 200 MG tablet, Take by mouth daily, Disp: , Rfl:     LIVALO 2 MG, Take 2 mg by mouth daily, Disp: , Rfl: 3    senna (SENOKOT) 8 6 mg, Take by mouth daily, Disp: , Rfl:         No notes on file                  Objective:    Vital Signs:   Vitals:    10/25/18 1400   BP: 120/70   Pulse: 88   Weight: 91 6 kg (202 lb)   Height: 5' 5" (1 651 m)     Neck Circumference: 41      [place] Sleepiness Scale: Total score: 4    Physical Exam:    General: Alert, appropriate, cooperative, overweight    Head: NC/AT, no retrognathia    Nose: No septal deviation, nares not obstructed, mucosa normal    Throat: Airway lumen narrowed, tongue base thickened, no tonsils visualized    Extremity: No clubbing, cyanosis, no edema    Skin: Warm, dry    Neuro: No motor abnormalities, cranial nerves appear intact    Sleep Study Results:   Unavailable  PAP Pressure: BiPAP, pressure unknown  DME Provider: [facility] [employer]    Counseling / Coordination of Care  Total clinic time spent today 25 minutes  A description of the counseling / coordination of care: We discussed equipment  AMAURY Vang    Board Certified Sleep Specialist Consent (Marginal Mandibular)/Introductory Paragraph: The rationale for Mohs was explained to the patient and consent was obtained. The risks, benefits and alternatives to therapy were discussed in detail. Specifically, the risks of damage to the marginal mandibular branch of the facial nerve, infection, scarring, bleeding, prolonged wound healing, incomplete removal, allergy to anesthesia, and recurrence were addressed. Prior to the procedure, the treatment site was clearly identified and confirmed by the patient. All components of Universal Protocol/PAUSE Rule completed.

## 2023-08-07 ENCOUNTER — HOSPITAL ENCOUNTER (OUTPATIENT)
Dept: ULTRASOUND IMAGING | Facility: CLINIC | Age: 73
Discharge: HOME | End: 2023-08-07
Payer: MEDICARE

## 2023-08-07 ENCOUNTER — HOSPITAL ENCOUNTER (OUTPATIENT)
Dept: MAMMOGRAPHY | Facility: CLINIC | Age: 73
Discharge: HOME | End: 2023-08-07
Payer: MEDICARE

## 2023-08-07 VITALS — BODY MASS INDEX: 34.16 KG/M2 | HEIGHT: 65 IN | WEIGHT: 205 LBS

## 2023-08-07 DIAGNOSIS — R92.8 ABNORMAL MAMMOGRAM: ICD-10-CM

## 2023-08-07 PROCEDURE — 77065 DX MAMMO INCL CAD UNI: CPT

## 2023-08-07 PROCEDURE — 76642 ULTRASOUND BREAST LIMITED: CPT

## 2023-08-07 PROCEDURE — G0279 TOMOSYNTHESIS, MAMMO: HCPCS

## 2023-09-14 ENCOUNTER — TELEPHONE (OUTPATIENT)
Dept: GASTROENTEROLOGY | Facility: CLINIC | Age: 73
End: 2023-09-14

## 2023-09-14 NOTE — TELEPHONE ENCOUNTER
----- Message from Esthela Prieto MD sent at 9/14/2023  2:56 PM EDT -----  Regarding: New patient for colon cancer screening /strong family history of colon cancer  Hi,    Please schedule this patient for office visit with me in the next 2 to 4 weeks.     Thank you,    Roxi Marques

## 2023-09-27 ENCOUNTER — TELEPHONE (OUTPATIENT)
Dept: GASTROENTEROLOGY | Facility: CLINIC | Age: 73
End: 2023-09-27

## 2023-09-27 ENCOUNTER — OFFICE VISIT (OUTPATIENT)
Dept: GASTROENTEROLOGY | Facility: CLINIC | Age: 73
End: 2023-09-27
Payer: MEDICARE

## 2023-09-27 VITALS
BODY MASS INDEX: 33.66 KG/M2 | HEIGHT: 65 IN | TEMPERATURE: 99 F | WEIGHT: 202 LBS | DIASTOLIC BLOOD PRESSURE: 86 MMHG | SYSTOLIC BLOOD PRESSURE: 124 MMHG

## 2023-09-27 DIAGNOSIS — Z12.11 COLON CANCER SCREENING: Primary | ICD-10-CM

## 2023-09-27 DIAGNOSIS — K21.9 GASTROESOPHAGEAL REFLUX DISEASE WITHOUT ESOPHAGITIS: ICD-10-CM

## 2023-09-27 PROCEDURE — 99203 OFFICE O/P NEW LOW 30 MIN: CPT | Performed by: INTERNAL MEDICINE

## 2023-09-27 RX ORDER — OXYBUTYNIN CHLORIDE 15 MG/1
15 TABLET, EXTENDED RELEASE ORAL DAILY
COMMUNITY
Start: 2023-09-01

## 2023-09-27 NOTE — PROGRESS NOTES
[facility] - Outpatient Consultation  Genejohn Certain 68 y.o. female MRN: [id number]  Encounter: [id number]          ASSESSMENT AND PLAN:      1. Colon cancer screening  -  - Colonoscopy; Future    2. Gastroesophageal reflux disease without esophagitis  ***  - EGD; Future    ______________________________________________________________________    HPI:  ***      REVIEW OF SYSTEMS:    CONSTITUTIONAL: Denies any fever, chills, rigors, and weight loss. HEENT: No earache or tinnitus. Denies hearing loss or visual disturbances. CARDIOVASCULAR: No chest pain or palpitations. RESPIRATORY: Denies any cough, hemoptysis, shortness of breath or dyspnea on exertion. GASTROINTESTINAL: As noted in the History of Present Illness. GENITOURINARY: No problems with urination. Denies any hematuria or dysuria. NEUROLOGIC: No dizziness or vertigo, denies headaches. MUSCULOSKELETAL: Denies any muscle or joint pain. SKIN: Denies skin rashes or itching. ENDOCRINE: Denies excessive thirst. Denies intolerance to heat or cold. PSYCHOSOCIAL: Denies depression or anxiety. Denies any recent memory loss.        Historical Information   Past Medical History:   Diagnosis Date   • Anxiety    • BiPAP (biphasic positive airway pressure) dependence    • Breast cyst     Left   • Chronic kidney disease    • Depression    • Elevated liver enzymes    • GERD (gastroesophageal reflux disease)    • Hepatitis B 1974   • Hyperlipidemia    • Infectious viral hepatitis     Hep B 1974   • Sleep apnea     Uses bipap     Past Surgical History:   Procedure Laterality Date   • BREAST BIOPSY Right 08/06/2021    intraductal papiloma   • BREAST LUMPECTOMY Right 09/21/2021    Procedure: HALEY  DIRECTED LUMPECTOMY;  Surgeon: Kaitlin Colivn MD;  Location: [facility];  Service: Surgical Oncology   • COLONOSCOPY     • HAND LIGAMENT RECONSTRUCTION Right    • MRI BREAST BIOPSY RIGHT (ALL INCLUSIVE) Right 08/06/2021   • UT ARTHRP KNE CONDYLE&PLATU MEDIAL&LAT COMPARTMENTS Left 2022    Procedure: TOTAL KNEE REPLACEMENT;  Surgeon: Rodríguez Rae MD;  Location: [facility];  Service: Orthopedics   • US BREAST HALEY  NEEDLE LOC RIGHT Right 2021     Social History   Social History     Substance and Sexual Activity   Alcohol Use Yes    Comment: rarely     Social History     Substance and Sexual Activity   Drug Use No     Social History     Tobacco Use   Smoking Status Former   • Types: Cigarettes   • Quit date:    • Years since quittin.7   Smokeless Tobacco Former     Family History   Problem Relation Age of Onset   • Colon cancer Mother 55   • Emphysema Father    • Breast cancer Sister 72   • No Known Problems Sister    • No Known Problems Maternal Grandmother    • No Known Problems Maternal Grandfather    • No Known Problems Paternal Grandmother    • No Known Problems Paternal Grandfather    • No Known Problems Brother    • No Known Problems Brother    • No Known Problems Brother    • No Known Problems Brother    • No Known Problems Maternal Aunt    • No Known Problems Maternal Aunt    • No Known Problems Paternal Aunt    • No Known Problems Paternal Aunt        Meds/Allergies       Current Outpatient Medications:   •  acetaminophen (TYLENOL) 500 mg tablet  •  ARIPiprazole (ABILIFY) 10 mg tablet  •  buPROPion (WELLBUTRIN XL) 150 mg 24 hr tablet  •  cholecalciferol (VITAMIN D3) 1,000 units tablet  •  docusate sodium (COLACE) 100 mg capsule  •  DULoxetine (CYMBALTA) 60 mg delayed release capsule  •  famotidine (PEPCID) 20 mg tablet  •  lamoTRIgine (LaMICtal) 200 MG tablet  •  LORazepam (ATIVAN) 1 mg tablet  •  oxybutynin (DITROPAN XL) 15 MG 24 hr tablet  •  senna (SENOKOT) 8.6 mg  •  acetaminophen (TYLENOL) 325 mg tablet  •  aspirin 325 mg tablet  •  b complex vitamins capsule  •  docusate sodium (COLACE) 100 mg capsule  •  ferrous sulfate 325 (65 Fe) mg tablet  •  LIVALO 2 MG  •  naloxone (NARCAN) 4 mg/0.1 mL nasal spray  • oxyCODONE (OXY-IR) 5 MG capsule    Allergies   Allergen Reactions   • Sulfa Antibiotics Rash           Objective     Blood pressure 124/86, temperature 99 °F (37.2 °C), temperature source Tympanic, height 5' 5" (1.651 m), weight 91.6 kg (202 lb). Body mass index is 33.61 kg/m². PHYSICAL EXAM:      General Appearance:   Alert, cooperative, no distress   HEENT:   Normocephalic, atraumatic, anicteric.     Neck:  Supple, symmetrical, trachea midline   Lungs:   Clear to auscultation bilaterally; no rales, rhonchi or wheezing; respirations unlabored    Heart[de-identified]   Regular rate and rhythm; no murmur, rub, or gallop. Abdomen:   Soft, non-tender, non-distended; normal bowel sounds; no masses, no organomegaly    Genitalia:   Deferred    Rectal:   Deferred    Extremities:  No cyanosis, clubbing or edema    Pulses:  2+ and symmetric    Skin:  No jaundice, rashes, or lesions    Lymph nodes:  No palpable cervical lymphadenopathy        Lab Results:   No visits with results within 1 Day(s) from this visit.    Latest known visit with results is:   Admission on 09/21/2022, Discharged on 09/22/2022   Component Date Value   • ABO Grouping 09/21/2022 B    • Rh Factor 09/21/2022 Positive    • SARS-CoV-2 09/21/2022 Negative    • INFLUENZA A PCR 09/21/2022 Negative    • INFLUENZA B PCR 09/21/2022 Negative    • RSV PCR 09/21/2022 Negative    • Sodium 09/22/2022 142    • Potassium 09/22/2022 4.0    • Chloride 09/22/2022 106    • CO2 09/22/2022 28    • ANION GAP 09/22/2022 8    • BUN 09/22/2022 14    • Creatinine 09/22/2022 1.03    • Glucose 09/22/2022 131    • Calcium 09/22/2022 8.4    • eGFR 09/22/2022 54    • WBC 09/22/2022 15.25 (H)    • RBC 09/22/2022 4.17    • Hemoglobin 09/22/2022 11.8    • Hematocrit 09/22/2022 38.0    • MCV 09/22/2022 91    • MCH 09/22/2022 28.3    • MCHC 09/22/2022 31.1 (L)    • RDW 09/22/2022 14.0    • Platelets 57/23/2848 284    • MPV 09/22/2022 9.8    • Supplier Name 09/22/2022 [employer]    • Supplier Phone Number 09/22/2022 [phone]    • Order Status 09/22/2022 Delivery Successful    • Delivery Request Date 09/22/2022 09/22/2022    • Date Delivered  09/22/2022 09/22/2022    • Item Description 09/22/2022 Lanie Schultz, Adult          Radiology Results:   No results found.

## 2023-09-27 NOTE — PATIENT INSTRUCTIONS
Scheduled date of colonoscopy/EGD (as of today): 11/02/2023  Physician performing colonoscopy: Dr. Michael Barrera   Location of colonoscopy: [address]  Bowel prep reviewed with patient: Miralax   Instructions reviewed with patient by: Kenneth Landry   Clearances:  N/A

## 2023-09-27 NOTE — PROGRESS NOTES
[facility] - Outpatient Consultation  [facility] 68 y.o. female MRN: [id number]  Encounter: [id number]          ASSESSMENT AND PLAN:      1. Colon cancer screening  -She has a strong family history of colon cancer. She is due for surveillance colonoscopy  -Colonoscopy ordered. Bowel prep instruction given. 2. Gastroesophageal reflux disease without esophagitis  -She has longstanding reflux well controlled with PPI  - I reviewed diet and lifestyle precautions. This includes limiting coffee, soda, tomatoes, citrus, fatty and spicy foods. I recommend waiting 3 hours after dinner to lie down. I recommend eating small frequent meals as well as sleeping with head of bed elevated at night. EGD to assess for esophagitis, hiatal hernia and Avendano's esophagus    - EGD; Future  Discussed risk and benefits of EGD and colonoscopy. Risk include but not with infection, bleeding or perforation, missed lesion. 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 She has a strong family history of colon cancer. Both first-degree and second-degree relative with colon cancer. She gets routine colonoscopy. Last colonoscopy was about 5 years ago. She had personal history of colon polyps in the past.    She also reports reflux symptoms. Currently taking famotidine and PPI. Her reflux symptoms are well controlled. She has not had EGD for over 10 years       REVIEW OF SYSTEMS:    CONSTITUTIONAL: Denies any fever, chills, rigors, and weight loss. HEENT: No earache or tinnitus. Denies hearing loss or visual disturbances. CARDIOVASCULAR: No chest pain or palpitations. RESPIRATORY: Denies any cough, hemoptysis, shortness of breath or dyspnea on exertion. GASTROINTESTINAL: As noted in the History of Present Illness. GENITOURINARY: No problems with urination.  Denies any hematuria or dysuria. NEUROLOGIC: No dizziness or vertigo, denies headaches. MUSCULOSKELETAL: Denies any muscle or joint pain. SKIN: Denies skin rashes or itching. ENDOCRINE: Denies excessive thirst. Denies intolerance to heat or cold. PSYCHOSOCIAL: Denies depression or anxiety. Denies any recent memory loss.        Historical Information   Past Medical History:   Diagnosis Date   • Anxiety    • BiPAP (biphasic positive airway pressure) dependence    • Breast cyst     Left   • Chronic kidney disease    • Depression    • Elevated liver enzymes    • GERD (gastroesophageal reflux disease)    • Hepatitis B    • Hyperlipidemia    • Infectious viral hepatitis     Hep B    • Sleep apnea     Uses bipap     Past Surgical History:   Procedure Laterality Date   • BREAST BIOPSY Right 2021    intraductal papiloma   • BREAST LUMPECTOMY Right 2021    Procedure: HALEY  DIRECTED LUMPECTOMY;  Surgeon: Alexei Fang MD;  Location: [facility];  Service: Surgical Oncology   • COLONOSCOPY     • HAND LIGAMENT RECONSTRUCTION Right    • MRI BREAST BIOPSY RIGHT (ALL INCLUSIVE) Right 2021   • NE ARTHRP KNE CONDYLE&PLATU MEDIAL&LAT COMPARTMENTS Left 2022    Procedure: TOTAL KNEE REPLACEMENT;  Surgeon: Chapis Gross MD;  Location: [facility];  Service: Orthopedics   •  BREAST HALEY  NEEDLE LOC RIGHT Right 2021     Social History   Social History     Substance and Sexual Activity   Alcohol Use Yes    Comment: rarely     Social History     Substance and Sexual Activity   Drug Use No     Social History     Tobacco Use   Smoking Status Former   • Types: Cigarettes   • Quit date:    • Years since quittin.7   Smokeless Tobacco Former     Family History   Problem Relation Age of Onset   • Colon cancer Mother 55   • Emphysema Father    • Breast cancer Sister 72   • No Known Problems Sister    • No Known Problems Maternal Grandmother    • No Known Problems Maternal Grandfather    • No Known Problems Paternal Grandmother    • No Known Problems Paternal Grandfather    • No Known Problems Brother    • No Known Problems Brother    • No Known Problems Brother    • No Known Problems Brother    • No Known Problems Maternal Aunt    • No Known Problems Maternal Aunt    • No Known Problems Paternal Aunt    • No Known Problems Paternal Aunt        Meds/Allergies       Current Outpatient Medications:   •  acetaminophen (TYLENOL) 500 mg tablet  •  ARIPiprazole (ABILIFY) 10 mg tablet  •  buPROPion (WELLBUTRIN XL) 150 mg 24 hr tablet  •  cholecalciferol (VITAMIN D3) 1,000 units tablet  •  docusate sodium (COLACE) 100 mg capsule  •  DULoxetine (CYMBALTA) 60 mg delayed release capsule  •  famotidine (PEPCID) 20 mg tablet  •  lamoTRIgine (LaMICtal) 200 MG tablet  •  LORazepam (ATIVAN) 1 mg tablet  •  oxybutynin (DITROPAN XL) 15 MG 24 hr tablet  •  senna (SENOKOT) 8.6 mg  •  acetaminophen (TYLENOL) 325 mg tablet  •  aspirin 325 mg tablet  •  b complex vitamins capsule  •  docusate sodium (COLACE) 100 mg capsule  •  ferrous sulfate 325 (65 Fe) mg tablet  •  LIVALO 2 MG  •  naloxone (NARCAN) 4 mg/0.1 mL nasal spray  •  oxyCODONE (OXY-IR) 5 MG capsule    Allergies   Allergen Reactions   • Sulfa Antibiotics Rash           Objective     Blood pressure 124/86, temperature 99 °F (37.2 °C), temperature source Tympanic, height 5' 5" (1.651 m), weight 91.6 kg (202 lb). Body mass index is 33.61 kg/m². PHYSICAL EXAM:      General Appearance:   Alert, cooperative, no distress   HEENT:   Normocephalic, atraumatic, anicteric.     Neck:  Supple, symmetrical, trachea midline   Lungs:   Clear to auscultation bilaterally; no rales, rhonchi or wheezing; respirations unlabored    Heart[de-identified]   Regular rate and rhythm; no murmur, rub, or gallop.    Abdomen:   Soft, non-tender, non-distended; normal bowel sounds; no masses, no organomegaly    Genitalia:   Deferred    Rectal:   Deferred    Extremities:  No cyanosis, clubbing or edema  Pulses:  2+ and symmetric    Skin:  No jaundice, rashes, or lesions    Lymph nodes:  No palpable cervical lymphadenopathy        Lab Results:   No visits with results within 1 Day(s) from this visit. Latest known visit with results is:   Admission on 09/21/2022, Discharged on 09/22/2022   Component Date Value   • ABO Grouping 09/21/2022 B    • Rh Factor 09/21/2022 Positive    • SARS-CoV-2 09/21/2022 Negative    • INFLUENZA A PCR 09/21/2022 Negative    • INFLUENZA B PCR 09/21/2022 Negative    • RSV PCR 09/21/2022 Negative    • Sodium 09/22/2022 142    • Potassium 09/22/2022 4.0    • Chloride 09/22/2022 106    • CO2 09/22/2022 28    • ANION GAP 09/22/2022 8    • BUN 09/22/2022 14    • Creatinine 09/22/2022 1.03    • Glucose 09/22/2022 131    • Calcium 09/22/2022 8.4    • eGFR 09/22/2022 54    • WBC 09/22/2022 15.25 (H)    • RBC 09/22/2022 4.17    • Hemoglobin 09/22/2022 11.8    • Hematocrit 09/22/2022 38.0    • MCV 09/22/2022 91    • MCH 09/22/2022 28.3    • MCHC 09/22/2022 31.1 (L)    • RDW 09/22/2022 14.0    • Platelets 08/78/4894 284    • MPV 09/22/2022 9.8    • Supplier Name 09/22/2022 [employer]    • Supplier Phone Number 09/22/2022 [phone]    • Order Status 09/22/2022 Delivery Successful    • Delivery Request Date 09/22/2022 09/22/2022    • Date Delivered  09/22/2022 09/22/2022    • Item Description 09/22/2022 Suzie Walls, Adult          Radiology Results:   No results found. Answers for HPI/ROS submitted by the patient on 9/21/2023  Frequency: rarely  Progression since onset: resolved  Pain - numeric: 0/10  anorexia: No  arthralgias: Yes  belching: No  constipation: No  diarrhea: No  dysuria: No  fever: No  flatus: No  frequency: No  headaches: No  hematochezia: No  hematuria: No  melena: No  myalgias: No  nausea:  No  weight loss: No  vomiting: No  Aggravated by: nothing  Relieved by: nothing

## 2023-10-20 NOTE — PROGRESS NOTES
Weight Management Medical Nutrition Assessment  Is here for medical meal planning. Last seen in 2/2020. Current wt: 201.8 lbs. She has gained 10.2 lbs from 2/2020. She has been dealing with a lot of stress due to her  being ill. He is doing better now and she feels she is ready to get back on track. Currently in therapy & feels this has been beneficial. Recently signed back up for [employer] [facility] [employer] and enjoys using [employer] meals now that she doesn't cook. Recall shows long gaps between meals and likely not consuming enough calories at this time. Meal plan and other resources provided. Options for f/u reviewed. She would like to rejoin [employer]. Classes set up. Dislikes:      Anthropometric Measurements  Start Weight (lbs): 204.6 lbs  Current wt:  201.8 lbs  %TBW loss: 1.4%   Ideal Body Weight (lbs): 150.1 lbs BMI 25 (65" 125 lbs)  Goal Weight (lbs): 160 lbs (last weighed this a long time ago)  Highest: 208 lbs  Lowest:  135 lbs  UBW: 160 lbs     Weight Loss History  Previous weight loss attempts: Commercial Programs ([employer], Sidon[employer], etc.)  Exercise  Self Created Diets (Portion Control, Healthy Food Choices, etc.)     Food and Nutrition Related History  Wake up: 9:00     Bed Time: 12:00     Food Recall  Breakfast: skip  Snack: skip  Lunch: 2:00: [employer], diet coke  Snack: skip  Dinner: 7 p.m. [employer]    Snack: was eating a lot of sweets/candy     Beverages: diet coke, coffee   Volume of beverage intake:  40 oz diet coke, 1 cup coffee w/h/h     Weekends: Same  Cravings: sweets  Trouble area of day: 6:00 p.m. Frequency of Eating out: not going out   Food restrictions: n/a  Cooking: self  Food Shopping: self     Physical Activity Intake  Activity: none currently  Frequency: n/a  Physical limitations/barriers to exercise:  back pain     Estimated Needs  Energy  [employer] Needs:  BMR 1799    1-1.5# loss weekly sedentary:    793-6841            1-1.5# loss weekly lightly active: 2718-0545  Protein:68-85 gm      (1.2-1.5g/kg IBW)  Fluid: 66 oz     (35mL/kg IBW)     Nutrition Diagnosis  Yes; Overweight/obesity  related to Excess energy intake as evidenced by  BMI more than normative standard for age and sex (obesity-grade I 32-30. 9)     Nutrition Intervention  Nutrition Prescription  Calories: 3326-3437  Protein: >65 gm     Meal Plan  Breakfast: 200-275, 12-20  Snack: skip  Lunch: 400, 15-25  Snack 160, 15  Dinner: 350-400, 25  Snack: skip     Nutrition Education:    Healthy Core Manual  Calorie controlled menu  Lean protein food choices  Healthy snack options  Food journaling tips  Dining out     Nutrition Counseling:  Strategies: meal planning, portion sizes, healthy snack choices, hydration, fiber intake, protein intake, exercise, food journal        Monitoring and Evaluation:  Evaluation criteria:  Energy Intake  Meet protein needs  Maintain adequate hydration  Monitor weekly weight  Meal planning/preparation  Food journal   Decreased portions at mealtimes and snacks  Physical activity      Barriers to learning:none  Readiness to change:  preparation  Comprehension: very good  Expected Compliance: very good

## 2023-10-25 ENCOUNTER — ANESTHESIA EVENT (OUTPATIENT)
Dept: ANESTHESIOLOGY | Facility: HOSPITAL | Age: 73
End: 2023-10-25

## 2023-10-25 ENCOUNTER — ANESTHESIA (OUTPATIENT)
Dept: ANESTHESIOLOGY | Facility: HOSPITAL | Age: 73
End: 2023-10-25

## 2023-10-30 ENCOUNTER — OFFICE VISIT (OUTPATIENT)
Dept: BARIATRICS | Facility: CLINIC | Age: 73
End: 2023-10-30

## 2023-10-30 VITALS — BODY MASS INDEX: 33.62 KG/M2 | WEIGHT: 201.8 LBS | HEIGHT: 65 IN

## 2023-10-30 DIAGNOSIS — R63.5 ABNORMAL WEIGHT GAIN: Primary | ICD-10-CM

## 2023-10-30 PROCEDURE — RECHECK

## 2023-10-30 PROCEDURE — WMDI30

## 2023-11-02 ENCOUNTER — ANESTHESIA EVENT (OUTPATIENT)
Dept: GASTROENTEROLOGY | Facility: AMBULARY SURGERY CENTER | Age: 73
End: 2023-11-02

## 2023-11-02 ENCOUNTER — ANESTHESIA (OUTPATIENT)
Dept: GASTROENTEROLOGY | Facility: AMBULARY SURGERY CENTER | Age: 73
End: 2023-11-02

## 2023-11-02 ENCOUNTER — HOSPITAL ENCOUNTER (OUTPATIENT)
Dept: GASTROENTEROLOGY | Facility: AMBULARY SURGERY CENTER | Age: 73
Setting detail: OUTPATIENT SURGERY
End: 2023-11-02
Attending: INTERNAL MEDICINE
Payer: MEDICARE

## 2023-11-02 VITALS
WEIGHT: 198 LBS | HEIGHT: 69 IN | OXYGEN SATURATION: 98 % | SYSTOLIC BLOOD PRESSURE: 152 MMHG | BODY MASS INDEX: 29.33 KG/M2 | TEMPERATURE: 96.2 F | HEART RATE: 87 BPM | DIASTOLIC BLOOD PRESSURE: 86 MMHG | RESPIRATION RATE: 16 BRPM

## 2023-11-02 DIAGNOSIS — Z12.11 COLON CANCER SCREENING: ICD-10-CM

## 2023-11-02 DIAGNOSIS — K21.9 GASTROESOPHAGEAL REFLUX DISEASE WITHOUT ESOPHAGITIS: ICD-10-CM

## 2023-11-02 PROCEDURE — 43239 EGD BIOPSY SINGLE/MULTIPLE: CPT | Performed by: INTERNAL MEDICINE

## 2023-11-02 PROCEDURE — 88305 TISSUE EXAM BY PATHOLOGIST: CPT | Performed by: PATHOLOGY

## 2023-11-02 PROCEDURE — 45385 COLONOSCOPY W/LESION REMOVAL: CPT | Performed by: INTERNAL MEDICINE

## 2023-11-02 RX ORDER — PROPOFOL 10 MG/ML
INJECTION, EMULSION INTRAVENOUS CONTINUOUS PRN
Status: DISCONTINUED | OUTPATIENT
Start: 2023-11-02 | End: 2023-11-02

## 2023-11-02 RX ORDER — PROPOFOL 10 MG/ML
INJECTION, EMULSION INTRAVENOUS AS NEEDED
Status: DISCONTINUED | OUTPATIENT
Start: 2023-11-02 | End: 2023-11-02

## 2023-11-02 RX ORDER — SODIUM CHLORIDE, SODIUM LACTATE, POTASSIUM CHLORIDE, CALCIUM CHLORIDE 600; 310; 30; 20 MG/100ML; MG/100ML; MG/100ML; MG/100ML
INJECTION, SOLUTION INTRAVENOUS CONTINUOUS PRN
Status: DISCONTINUED | OUTPATIENT
Start: 2023-11-02 | End: 2023-11-02

## 2023-11-02 RX ORDER — LIDOCAINE HYDROCHLORIDE 20 MG/ML
INJECTION, SOLUTION EPIDURAL; INFILTRATION; INTRACAUDAL; PERINEURAL AS NEEDED
Status: DISCONTINUED | OUTPATIENT
Start: 2023-11-02 | End: 2023-11-02

## 2023-11-02 RX ADMIN — SODIUM CHLORIDE, SODIUM LACTATE, POTASSIUM CHLORIDE, AND CALCIUM CHLORIDE: .6; .31; .03; .02 INJECTION, SOLUTION INTRAVENOUS at 10:35

## 2023-11-02 RX ADMIN — PROPOFOL 100 MCG/KG/MIN: 10 INJECTION, EMULSION INTRAVENOUS at 10:53

## 2023-11-02 RX ADMIN — PROPOFOL 100 MG: 10 INJECTION, EMULSION INTRAVENOUS at 10:46

## 2023-11-02 RX ADMIN — SODIUM CHLORIDE, SODIUM LACTATE, POTASSIUM CHLORIDE, AND CALCIUM CHLORIDE: .6; .31; .03; .02 INJECTION, SOLUTION INTRAVENOUS at 11:04

## 2023-11-02 RX ADMIN — PROPOFOL 50 MG: 10 INJECTION, EMULSION INTRAVENOUS at 10:51

## 2023-11-02 RX ADMIN — PROPOFOL 50 MG: 10 INJECTION, EMULSION INTRAVENOUS at 10:48

## 2023-11-02 RX ADMIN — LIDOCAINE HYDROCHLORIDE 100 MG: 20 INJECTION, SOLUTION EPIDURAL; INFILTRATION; INTRACAUDAL; PERINEURAL at 10:46

## 2023-11-02 NOTE — ANESTHESIA PREPROCEDURE EVALUATION
Procedure:  COLONOSCOPY  EGD    Relevant Problems   GI/HEPATIC   (+) GERD (gastroesophageal reflux disease)      MUSCULOSKELETAL   (+) Primary osteoarthritis of left knee      NEURO/PSYCH   (+) Anxiety   (+) Depression      PULMONARY   (+) Complex sleep apnea syndrome   (+) Sleep apnea treated with nocturnal BiPAP        Physical Exam    Airway    Mallampati score: II  TM Distance: >3 FB  Neck ROM: full     Dental   No notable dental hx     Cardiovascular      Pulmonary      Other Findings        Anesthesia Plan  ASA Score- 2     Anesthesia Type- IV sedation with anesthesia with ASA Monitors. Additional Monitors:     Airway Plan:     Comment: I have seen the patient and reviewed the history. Patient to receive IV sedation with full ASA monitors. Risks discussed with the patient, consent signed. I have seen the patient and reviewed the history. Patient to receive IV sedation with full ASA monitors. Risks discussed with the patient, consent signed. .       Plan Factors-Exercise tolerance (METS): >4 METS. Chart reviewed. Patient summary reviewed. Patient is not a current smoker. Patient instructed to abstain from smoking on day of procedure. Patient did not smoke on day of surgery. Induction- intravenous. Postoperative Plan-     Informed Consent- Anesthetic plan and risks discussed with patient and spouse Krys Gutierrez. I personally reviewed this patient with the CRNA. Discussed and agreed on the Anesthesia Plan with the CRNA. Ann Wong

## 2023-11-02 NOTE — ANESTHESIA POSTPROCEDURE EVALUATION
Post-Op Assessment Note    CV Status:  Stable  Pain Score: 0    Pain management: adequate     Mental Status:  Awake and alert   Hydration Status:  Stable   PONV Controlled:  None   Airway Patency:  Patent and adequate      Post Op Vitals Reviewed: Yes      Staff: CRNA, Anesthesiologist         No notable events documented.     BP  135/73   Temp     Pulse  95   Resp   16   SpO2   99%

## 2023-11-02 NOTE — H&P
History and Physical - [facility]  Giorgio Rose 68 y.o. female MRN: [id number]    HPI: Giorgio Rose is a 68y.o. year old female who presents for EGD and colonoscopy for colon cancer screening with reflux. Strong family history of colon cancer.   Last colonoscopy 5 years ago      Review of Systems    Historical Information   Past Medical History:   Diagnosis Date    Anxiety     BiPAP (biphasic positive airway pressure) dependence     Breast cyst     Left    Chronic kidney disease     Colon polyp     CPAP (continuous positive airway pressure) dependence     Depression     Elevated liver enzymes     GERD (gastroesophageal reflux disease)     Hepatitis B     Hyperlipidemia     Infectious viral hepatitis     Hep B     Sleep apnea     Uses bipap     Past Surgical History:   Procedure Laterality Date    BREAST BIOPSY Right 2021    intraductal papiloma    BREAST LUMPECTOMY Right 2021    Procedure: HALEY  DIRECTED LUMPECTOMY;  Surgeon: Stiven Lui MD;  Location: [facility];  Service: Surgical Oncology    CATARACT EXTRACTION      COLONOSCOPY      HAND LIGAMENT RECONSTRUCTION Right     JOINT REPLACEMENT      MRI BREAST BIOPSY RIGHT (ALL INCLUSIVE) Right 2021    RI ARTHRP KNE CONDYLE&PLATU MEDIAL&LAT COMPARTMENTS Left 2022    Procedure: TOTAL KNEE REPLACEMENT;  Surgeon: Ceferino Hay MD;  Location: [facility];  Service: Orthopedics     BREAST HALEY  NEEDLE LOC RIGHT Right 2021     Social History   Social History     Substance and Sexual Activity   Alcohol Use Yes    Comment: rarely     Social History     Substance and Sexual Activity   Drug Use No     Social History     Tobacco Use   Smoking Status Former    Types: Cigarettes    Quit date:     Years since quittin.8   Smokeless Tobacco Former     Family History   Problem Relation Age of Onset    Colon cancer Mother 55    Emphysema Father     Breast cancer Sister 72    No Known Problems Sister     No Known Problems Maternal Grandmother     No Known Problems Maternal Grandfather     No Known Problems Paternal Grandmother     No Known Problems Paternal Grandfather     No Known Problems Brother     No Known Problems Brother     No Known Problems Brother     No Known Problems Brother     No Known Problems Maternal Aunt     No Known Problems Maternal Aunt     No Known Problems Paternal Aunt     No Known Problems Paternal Aunt        Meds/Allergies     (Not in a hospital admission)      Allergies   Allergen Reactions    Sulfa Antibiotics Rash       Objective     BP (!) 145/101   Pulse 104   Temp (!) 97.1 °F (36.2 °C) (Temporal)   Resp 16   Ht 5' 9" (1.753 m)   Wt 89.8 kg (198 lb)   SpO2 96%   BMI 29.24 kg/m²       PHYSICAL EXAM    Gen: NAD  CV: RRR  CHEST: Clear  ABD: soft, NT/ND  EXT: no edema  Neuro: AAO      ASSESSMENT/PLAN:  This is a 68y.o. year old female here for EGD and colon    PLAN:   Procedure: EGD and colonoscopy with biopsy and possible polypectomy

## 2023-11-07 PROCEDURE — 88305 TISSUE EXAM BY PATHOLOGIST: CPT | Performed by: PATHOLOGY

## 2023-11-16 ENCOUNTER — CLINICAL SUPPORT (OUTPATIENT)
Dept: BARIATRICS | Facility: CLINIC | Age: 73
End: 2023-11-16

## 2023-11-16 VITALS — HEIGHT: 69 IN | WEIGHT: 198 LBS | BODY MASS INDEX: 29.33 KG/M2

## 2023-11-16 DIAGNOSIS — R63.5 ABNORMAL WEIGHT GAIN: Primary | ICD-10-CM

## 2023-11-16 PROCEDURE — RECHECK

## 2023-11-21 ENCOUNTER — CLINICAL SUPPORT (OUTPATIENT)
Dept: BARIATRICS | Facility: CLINIC | Age: 73
End: 2023-11-21

## 2023-11-21 VITALS — HEIGHT: 65 IN | WEIGHT: 196.6 LBS | BODY MASS INDEX: 32.76 KG/M2

## 2023-11-21 DIAGNOSIS — R63.5 ABNORMAL WEIGHT GAIN: Primary | ICD-10-CM

## 2023-11-21 PROCEDURE — RECHECK

## 2023-11-30 ENCOUNTER — CLINICAL SUPPORT (OUTPATIENT)
Dept: BARIATRICS | Facility: CLINIC | Age: 73
End: 2023-11-30

## 2023-11-30 VITALS — HEIGHT: 65 IN | WEIGHT: 195.4 LBS | BODY MASS INDEX: 32.55 KG/M2

## 2023-11-30 DIAGNOSIS — R63.5 ABNORMAL WEIGHT GAIN: Primary | ICD-10-CM

## 2023-11-30 PROCEDURE — RECHECK

## 2023-12-04 PROBLEM — G47.33 OSA (OBSTRUCTIVE SLEEP APNEA): Status: ACTIVE | Noted: 2023-12-04

## 2023-12-04 NOTE — PROGRESS NOTES
Weight Management Medical Nutrition Assessment  Is here for [employer] f/u. Current wt: 193 lbs. She has lost  8.8 lbs x ~5 wks. Finding she is feeling a lot of guilt eating due to her  being on a feeding tube. She notes that when he is done with his TF he does not feel hunger. Suggest that she time meals for after he is done as this may help her. She will also discuss with her therapist. Reports having some thoughts of mourning over the loss of food, specifically not being able to go out to eat with her . She feels this will get better with time. Support and encouragement provided. Dislikes:      Anthropometric Measurements  Start Weight (lbs): 204.6 lbs  Current wt:  193 lbs  %TBW loss: 5.7%   Ideal Body Weight (lbs): 150.1 lbs BMI 25 (65" 125 lbs)  Goal Weight (lbs): 160 lbs (last weighed this a long time ago)  Highest: 208 lbs  Lowest:  135 lbs  UBW: 160 lbs     Weight Loss History  Previous weight loss attempts: Commercial Programs ([employer], Ashlie Kunz, etc.)  Exercise  Self Created Diets (Portion Control, Healthy Food Choices, etc.)     Food and Nutrition Related History  Wake up: 9:00     Bed Time: 12:00     Food Recall  Breakfast: skip  Snack: skip  Lunch: 2:00: [employer], diet coke  Snack: skip  Dinner: 7 p.m. yogurt, pepper, wheat thins OR yogurt, banana, berries  Snack: 5 white chocolate wafers     Beverages: diet coke, coffee, water   Volume of beverage intake:  42 oz water, 1 cup coffee w/h/h, few diet coke/day     Weekends: Same  Cravings: sweets  Trouble area of day: 6:00 p.m. Frequency of Eating out: not going out   Food restrictions: n/a  Cooking: self  Food Shopping: self     Physical Activity Intake  Activity: none currently  Frequency: n/a  Physical limitations/barriers to exercise:  back pain     Estimated Needs  Energy  [employer] Needs:  BMR 1386    1# loss weekly sedentary:   1164            1# loss weekly lightly active:  1406  Protein:68-85 gm (1.2-1.5g/kg IBW)  Fluid: 66 oz     (35mL/kg IBW)     Nutrition Diagnosis  Yes; Overweight/obesity  related to Excess energy intake as evidenced by  BMI more than normative standard for age and sex (obesity-grade I 32-30. 9)     Nutrition Intervention  Nutrition Prescription  Calories: 5843-6041  Protein: >65 gm     Meal Plan  Breakfast: 200-275, 12-20  Snack: skip  Lunch: 400, 15-25  Snack 160, 15  Dinner: 350-400, 25  Snack: skip     Nutrition Education:    Healthy Core Manual  Calorie controlled menu  Lean protein food choices  Healthy snack options  Food journaling tips  Dining out     Nutrition Counseling:  Strategies: meal planning, portion sizes, healthy snack choices, hydration, fiber intake, protein intake, exercise, food journal        Monitoring and Evaluation:  Evaluation criteria:  Energy Intake  Meet protein needs  Maintain adequate hydration  Monitor weekly weight  Meal planning/preparation  Food journal   Decreased portions at mealtimes and snacks  Physical activity      Barriers to learning:none  Readiness to change:  action  Comprehension: very good  Expected Compliance: very good

## 2023-12-06 ENCOUNTER — OFFICE VISIT (OUTPATIENT)
Dept: BARIATRICS | Facility: CLINIC | Age: 73
End: 2023-12-06

## 2023-12-06 VITALS — WEIGHT: 193 LBS | HEIGHT: 65 IN | BODY MASS INDEX: 32.15 KG/M2

## 2023-12-06 DIAGNOSIS — R63.5 ABNORMAL WEIGHT GAIN: Primary | ICD-10-CM

## 2023-12-06 PROCEDURE — RECHECK

## 2023-12-21 ENCOUNTER — CLINICAL SUPPORT (OUTPATIENT)
Dept: BARIATRICS | Facility: CLINIC | Age: 73
End: 2023-12-21

## 2023-12-21 VITALS — WEIGHT: 188 LBS | BODY MASS INDEX: 31.32 KG/M2 | HEIGHT: 65 IN

## 2023-12-21 DIAGNOSIS — R63.5 ABNORMAL WEIGHT GAIN: Primary | ICD-10-CM

## 2023-12-21 PROCEDURE — RECHECK

## 2023-12-28 ENCOUNTER — TELEPHONE (OUTPATIENT)
Dept: SLEEP CENTER | Facility: CLINIC | Age: 73
End: 2023-12-28

## 2023-12-28 ENCOUNTER — CLINICAL SUPPORT (OUTPATIENT)
Dept: BARIATRICS | Facility: CLINIC | Age: 73
End: 2023-12-28

## 2023-12-28 VITALS — HEIGHT: 65 IN | WEIGHT: 193.2 LBS | BODY MASS INDEX: 32.19 KG/M2

## 2023-12-28 DIAGNOSIS — R63.5 ABNORMAL WEIGHT GAIN: Primary | ICD-10-CM

## 2023-12-28 PROCEDURE — RECHECK

## 2024-01-11 ENCOUNTER — CLINICAL SUPPORT (OUTPATIENT)
Dept: BARIATRICS | Facility: CLINIC | Age: 74
End: 2024-01-11

## 2024-01-11 VITALS — BODY MASS INDEX: 31.02 KG/M2 | WEIGHT: 186.2 LBS | HEIGHT: 65 IN

## 2024-01-11 DIAGNOSIS — R63.5 ABNORMAL WEIGHT GAIN: Primary | ICD-10-CM

## 2024-01-11 PROCEDURE — RECHECK

## 2024-01-22 ENCOUNTER — HOSPITAL ENCOUNTER (INPATIENT)
Facility: HOSPITAL | Age: 74
LOS: 5 days | Discharge: HOME | DRG: 872 | End: 2024-01-27
Attending: EMERGENCY MEDICINE | Admitting: INTERNAL MEDICINE
Payer: MEDICARE

## 2024-01-22 ENCOUNTER — APPOINTMENT (EMERGENCY)
Dept: RADIOLOGY | Facility: HOSPITAL | Age: 74
DRG: 872 | End: 2024-01-22
Payer: MEDICARE

## 2024-01-22 DIAGNOSIS — R22.0 FACIAL SWELLING: Primary | ICD-10-CM

## 2024-01-22 DIAGNOSIS — K11.21 ACUTE SIALOADENITIS: ICD-10-CM

## 2024-01-22 DIAGNOSIS — K59.00 CONSTIPATION: ICD-10-CM

## 2024-01-22 DIAGNOSIS — F41.9 ANXIETY: ICD-10-CM

## 2024-01-22 DIAGNOSIS — I10 HYPERTENSION: ICD-10-CM

## 2024-01-22 DIAGNOSIS — K21.9 GASTROESOPHAGEAL REFLUX DISEASE, UNSPECIFIED WHETHER ESOPHAGITIS PRESENT: ICD-10-CM

## 2024-01-22 LAB
ANION GAP SERPL CALCULATED.3IONS-SCNC: 10 MMOL/L
BASOPHILS # BLD AUTO: 0.07 THOUSANDS/ÂΜL (ref 0–0.1)
BASOPHILS NFR BLD AUTO: 0 % (ref 0–1)
BILIRUB UR QL STRIP: NEGATIVE
BUN SERPL-MCNC: 12 MG/DL (ref 5–25)
CALCIUM SERPL-MCNC: 9.3 MG/DL (ref 8.4–10.2)
CHLORIDE SERPL-SCNC: 102 MMOL/L (ref 96–108)
CLARITY UR: CLEAR
CO2 SERPL-SCNC: 25 MMOL/L (ref 21–32)
COLOR UR: ABNORMAL
CREAT SERPL-MCNC: 0.95 MG/DL (ref 0.6–1.3)
EOSINOPHIL # BLD AUTO: 0.09 THOUSAND/ÂΜL (ref 0–0.61)
EOSINOPHIL NFR BLD AUTO: 0 % (ref 0–6)
ERYTHROCYTE [DISTWIDTH] IN BLOOD BY AUTOMATED COUNT: 13.8 % (ref 11.6–15.1)
GFR SERPL CREATININE-BSD FRML MDRD: 60 ML/MIN/1.73SQ M
GLUCOSE SERPL-MCNC: 157 MG/DL (ref 65–140)
GLUCOSE UR STRIP-MCNC: NEGATIVE MG/DL
HCT VFR BLD AUTO: 46.9 % (ref 34.8–46.1)
HGB BLD-MCNC: 15 G/DL (ref 11.5–15.4)
HGB UR QL STRIP.AUTO: NEGATIVE
IMM GRANULOCYTES # BLD AUTO: 0.11 THOUSAND/UL (ref 0–0.2)
IMM GRANULOCYTES NFR BLD AUTO: 1 % (ref 0–2)
KETONES UR STRIP-MCNC: NEGATIVE MG/DL
LEUKOCYTE ESTERASE UR QL STRIP: NEGATIVE
LYMPHOCYTES # BLD AUTO: 2.45 THOUSANDS/ÂΜL (ref 0.6–4.47)
LYMPHOCYTES NFR BLD AUTO: 12 % (ref 14–44)
MCH RBC QN AUTO: 28 PG (ref 26.8–34.3)
MCHC RBC AUTO-ENTMCNC: 32 G/DL (ref 31.4–37.4)
MCV RBC AUTO: 88 FL (ref 82–98)
MONOCYTES # BLD AUTO: 1.09 THOUSAND/ÂΜL (ref 0.17–1.22)
MONOCYTES NFR BLD AUTO: 5 % (ref 4–12)
NEUTROPHILS # BLD AUTO: 16.39 THOUSANDS/ÂΜL (ref 1.85–7.62)
NEUTS SEG NFR BLD AUTO: 82 % (ref 43–75)
NITRITE UR QL STRIP: NEGATIVE
NRBC BLD AUTO-RTO: 0 /100 WBCS
PH UR STRIP.AUTO: 5.5 [PH]
PLATELET # BLD AUTO: 363 THOUSANDS/UL (ref 149–390)
PMV BLD AUTO: 9.1 FL (ref 8.9–12.7)
POTASSIUM SERPL-SCNC: 3.8 MMOL/L (ref 3.5–5.3)
PROT UR STRIP-MCNC: NEGATIVE MG/DL
RBC # BLD AUTO: 5.36 MILLION/UL (ref 3.81–5.12)
SODIUM SERPL-SCNC: 137 MMOL/L (ref 135–147)
SP GR UR STRIP.AUTO: 1.04 (ref 1–1.03)
UROBILINOGEN UR STRIP-ACNC: <2 MG/DL
WBC # BLD AUTO: 20.2 THOUSAND/UL (ref 4.31–10.16)

## 2024-01-22 PROCEDURE — 99285 EMERGENCY DEPT VISIT HI MDM: CPT | Performed by: EMERGENCY MEDICINE

## 2024-01-22 PROCEDURE — 96374 THER/PROPH/DIAG INJ IV PUSH: CPT

## 2024-01-22 PROCEDURE — 85025 COMPLETE CBC W/AUTO DIFF WBC: CPT

## 2024-01-22 PROCEDURE — 96361 HYDRATE IV INFUSION ADD-ON: CPT

## 2024-01-22 PROCEDURE — 99285 EMERGENCY DEPT VISIT HI MDM: CPT

## 2024-01-22 PROCEDURE — 80048 BASIC METABOLIC PNL TOTAL CA: CPT

## 2024-01-22 PROCEDURE — 99223 1ST HOSP IP/OBS HIGH 75: CPT | Performed by: INTERNAL MEDICINE

## 2024-01-22 PROCEDURE — 70491 CT SOFT TISSUE NECK W/DYE: CPT

## 2024-01-22 PROCEDURE — 81003 URINALYSIS AUTO W/O SCOPE: CPT | Performed by: INTERNAL MEDICINE

## 2024-01-22 PROCEDURE — G1004 CDSM NDSC: HCPCS

## 2024-01-22 PROCEDURE — 36415 COLL VENOUS BLD VENIPUNCTURE: CPT

## 2024-01-22 RX ORDER — HYDROMORPHONE HCL/PF 1 MG/ML
0.5 SYRINGE (ML) INJECTION ONCE
Status: COMPLETED | OUTPATIENT
Start: 2024-01-22 | End: 2024-01-22

## 2024-01-22 RX ORDER — FAMOTIDINE 20 MG/1
20 TABLET, FILM COATED ORAL DAILY
Status: DISCONTINUED | OUTPATIENT
Start: 2024-01-23 | End: 2024-01-27 | Stop reason: HOSPADM

## 2024-01-22 RX ORDER — HYDROMORPHONE HCL IN WATER/PF 6 MG/30 ML
0.2 PATIENT CONTROLLED ANALGESIA SYRINGE INTRAVENOUS EVERY 2 HOUR PRN
Status: DISCONTINUED | OUTPATIENT
Start: 2024-01-22 | End: 2024-01-23

## 2024-01-22 RX ORDER — ACETAMINOPHEN 325 MG/1
975 TABLET ORAL EVERY 8 HOURS SCHEDULED
Status: DISCONTINUED | OUTPATIENT
Start: 2024-01-22 | End: 2024-01-27 | Stop reason: HOSPADM

## 2024-01-22 RX ORDER — ARIPIPRAZOLE 10 MG/1
10 TABLET ORAL DAILY
Status: DISCONTINUED | OUTPATIENT
Start: 2024-01-23 | End: 2024-01-27 | Stop reason: HOSPADM

## 2024-01-22 RX ORDER — LAMOTRIGINE 100 MG/1
200 TABLET ORAL DAILY
Status: DISCONTINUED | OUTPATIENT
Start: 2024-01-23 | End: 2024-01-27 | Stop reason: HOSPADM

## 2024-01-22 RX ORDER — DOCUSATE SODIUM 100 MG/1
100 CAPSULE, LIQUID FILLED ORAL 2 TIMES DAILY
Status: DISCONTINUED | OUTPATIENT
Start: 2024-01-22 | End: 2024-01-27 | Stop reason: HOSPADM

## 2024-01-22 RX ORDER — BUPROPION HYDROCHLORIDE 150 MG/1
150 TABLET ORAL DAILY
Status: DISCONTINUED | OUTPATIENT
Start: 2024-01-23 | End: 2024-01-27 | Stop reason: HOSPADM

## 2024-01-22 RX ORDER — MORPHINE SULFATE 4 MG/ML
4 INJECTION, SOLUTION INTRAMUSCULAR; INTRAVENOUS ONCE
Status: COMPLETED | OUTPATIENT
Start: 2024-01-22 | End: 2024-01-22

## 2024-01-22 RX ORDER — DULOXETIN HYDROCHLORIDE 60 MG/1
60 CAPSULE, DELAYED RELEASE ORAL DAILY
Status: DISCONTINUED | OUTPATIENT
Start: 2024-01-23 | End: 2024-01-27 | Stop reason: HOSPADM

## 2024-01-22 RX ORDER — ENOXAPARIN SODIUM 100 MG/ML
40 INJECTION SUBCUTANEOUS DAILY
Status: DISCONTINUED | OUTPATIENT
Start: 2024-01-23 | End: 2024-01-27 | Stop reason: HOSPADM

## 2024-01-22 RX ORDER — LORAZEPAM 1 MG/1
1 TABLET ORAL 3 TIMES DAILY PRN
Status: DISCONTINUED | OUTPATIENT
Start: 2024-01-22 | End: 2024-01-27 | Stop reason: HOSPADM

## 2024-01-22 RX ADMIN — ACETAMINOPHEN 975 MG: 325 TABLET, FILM COATED ORAL at 21:30

## 2024-01-22 RX ADMIN — IOHEXOL 85 ML: 350 INJECTION, SOLUTION INTRAVENOUS at 20:33

## 2024-01-22 RX ADMIN — SODIUM CHLORIDE 3 G: 9 INJECTION, SOLUTION INTRAVENOUS at 21:23

## 2024-01-22 RX ADMIN — MORPHINE SULFATE 4 MG: 4 INJECTION, SOLUTION INTRAMUSCULAR; INTRAVENOUS at 20:07

## 2024-01-22 RX ADMIN — SODIUM CHLORIDE 1000 ML: 0.9 INJECTION, SOLUTION INTRAVENOUS at 20:07

## 2024-01-22 RX ADMIN — HYDROMORPHONE HYDROCHLORIDE 0.5 MG: 1 INJECTION, SOLUTION INTRAMUSCULAR; INTRAVENOUS; SUBCUTANEOUS at 21:29

## 2024-01-22 RX ADMIN — DOCUSATE SODIUM 100 MG: 100 CAPSULE, LIQUID FILLED ORAL at 21:30

## 2024-01-23 PROBLEM — A41.9 SEPSIS (HCC): Status: ACTIVE | Noted: 2024-01-23

## 2024-01-23 LAB
ALBUMIN SERPL BCP-MCNC: 4.1 G/DL (ref 3.5–5)
ALP SERPL-CCNC: 135 U/L (ref 34–104)
ALT SERPL W P-5'-P-CCNC: 8 U/L (ref 7–52)
ANION GAP SERPL CALCULATED.3IONS-SCNC: 10 MMOL/L
ANISOCYTOSIS BLD QL SMEAR: PRESENT
AST SERPL W P-5'-P-CCNC: 21 U/L (ref 13–39)
BASOPHILS # BLD MANUAL: 0 THOUSAND/UL (ref 0–0.1)
BASOPHILS NFR MAR MANUAL: 0 % (ref 0–1)
BILIRUB SERPL-MCNC: 0.85 MG/DL (ref 0.2–1)
BUN SERPL-MCNC: 8 MG/DL (ref 5–25)
CALCIUM SERPL-MCNC: 8.7 MG/DL (ref 8.4–10.2)
CHLORIDE SERPL-SCNC: 101 MMOL/L (ref 96–108)
CO2 SERPL-SCNC: 23 MMOL/L (ref 21–32)
CREAT SERPL-MCNC: 0.78 MG/DL (ref 0.6–1.3)
EOSINOPHIL # BLD MANUAL: 0 THOUSAND/UL (ref 0–0.4)
EOSINOPHIL NFR BLD MANUAL: 0 % (ref 0–6)
ERYTHROCYTE [DISTWIDTH] IN BLOOD BY AUTOMATED COUNT: 13.8 % (ref 11.6–15.1)
GFR SERPL CREATININE-BSD FRML MDRD: 76 ML/MIN/1.73SQ M
GLUCOSE SERPL-MCNC: 161 MG/DL (ref 65–140)
HCT VFR BLD AUTO: 46.6 % (ref 34.8–46.1)
HGB BLD-MCNC: 14.8 G/DL (ref 11.5–15.4)
LYMPHOCYTES # BLD AUTO: 2.1 THOUSAND/UL (ref 0.6–4.47)
LYMPHOCYTES # BLD AUTO: 8 % (ref 14–44)
MAGNESIUM SERPL-MCNC: 2.1 MG/DL (ref 1.9–2.7)
MCH RBC QN AUTO: 27.6 PG (ref 26.8–34.3)
MCHC RBC AUTO-ENTMCNC: 31.8 G/DL (ref 31.4–37.4)
MCV RBC AUTO: 87 FL (ref 82–98)
MONOCYTES # BLD AUTO: 1.05 THOUSAND/UL (ref 0–1.22)
MONOCYTES NFR BLD: 4 % (ref 4–12)
NEUTROPHILS # BLD MANUAL: 23.08 THOUSAND/UL (ref 1.85–7.62)
NEUTS SEG NFR BLD AUTO: 88 % (ref 43–75)
PHOSPHATE SERPL-MCNC: 3 MG/DL (ref 2.3–4.1)
PLATELET # BLD AUTO: 315 THOUSANDS/UL (ref 149–390)
PLATELET BLD QL SMEAR: ADEQUATE
PMV BLD AUTO: 9.2 FL (ref 8.9–12.7)
POTASSIUM SERPL-SCNC: 5.4 MMOL/L (ref 3.5–5.3)
PROT SERPL-MCNC: 7.5 G/DL (ref 6.4–8.4)
RBC # BLD AUTO: 5.36 MILLION/UL (ref 3.81–5.12)
RBC MORPH BLD: PRESENT
SODIUM SERPL-SCNC: 134 MMOL/L (ref 135–147)
WBC # BLD AUTO: 26.23 THOUSAND/UL (ref 4.31–10.16)

## 2024-01-23 PROCEDURE — 92610 EVALUATE SWALLOWING FUNCTION: CPT

## 2024-01-23 PROCEDURE — 80053 COMPREHEN METABOLIC PANEL: CPT | Performed by: INTERNAL MEDICINE

## 2024-01-23 PROCEDURE — 85027 COMPLETE CBC AUTOMATED: CPT | Performed by: INTERNAL MEDICINE

## 2024-01-23 PROCEDURE — 99233 SBSQ HOSP IP/OBS HIGH 50: CPT | Performed by: NURSE PRACTITIONER

## 2024-01-23 PROCEDURE — 36415 COLL VENOUS BLD VENIPUNCTURE: CPT | Performed by: INTERNAL MEDICINE

## 2024-01-23 PROCEDURE — 84100 ASSAY OF PHOSPHORUS: CPT | Performed by: INTERNAL MEDICINE

## 2024-01-23 PROCEDURE — 83735 ASSAY OF MAGNESIUM: CPT | Performed by: INTERNAL MEDICINE

## 2024-01-23 PROCEDURE — 85007 BL SMEAR W/DIFF WBC COUNT: CPT | Performed by: INTERNAL MEDICINE

## 2024-01-23 RX ORDER — LABETALOL HYDROCHLORIDE 5 MG/ML
10 INJECTION, SOLUTION INTRAVENOUS ONCE
Status: COMPLETED | OUTPATIENT
Start: 2024-01-23 | End: 2024-01-23

## 2024-01-23 RX ORDER — ONDANSETRON 2 MG/ML
4 INJECTION INTRAMUSCULAR; INTRAVENOUS EVERY 6 HOURS PRN
Status: DISCONTINUED | OUTPATIENT
Start: 2024-01-23 | End: 2024-01-27 | Stop reason: HOSPADM

## 2024-01-23 RX ORDER — HYDRALAZINE HYDROCHLORIDE 20 MG/ML
10 INJECTION INTRAMUSCULAR; INTRAVENOUS EVERY 4 HOURS PRN
Status: DISCONTINUED | OUTPATIENT
Start: 2024-01-23 | End: 2024-01-26

## 2024-01-23 RX ORDER — MORPHINE SULFATE 4 MG/ML
4 INJECTION, SOLUTION INTRAMUSCULAR; INTRAVENOUS
Status: DISCONTINUED | OUTPATIENT
Start: 2024-01-23 | End: 2024-01-27 | Stop reason: HOSPADM

## 2024-01-23 RX ORDER — SODIUM CHLORIDE 9 MG/ML
75 INJECTION, SOLUTION INTRAVENOUS CONTINUOUS
Status: DISCONTINUED | OUTPATIENT
Start: 2024-01-23 | End: 2024-01-26

## 2024-01-23 RX ADMIN — HYDROMORPHONE HYDROCHLORIDE 0.2 MG: 0.2 INJECTION, SOLUTION INTRAMUSCULAR; INTRAVENOUS; SUBCUTANEOUS at 01:41

## 2024-01-23 RX ADMIN — DULOXETINE HYDROCHLORIDE 60 MG: 60 CAPSULE, DELAYED RELEASE ORAL at 10:29

## 2024-01-23 RX ADMIN — HYDRALAZINE HYDROCHLORIDE 10 MG: 20 INJECTION, SOLUTION INTRAMUSCULAR; INTRAVENOUS at 08:21

## 2024-01-23 RX ADMIN — ENOXAPARIN SODIUM 40 MG: 40 INJECTION SUBCUTANEOUS at 08:20

## 2024-01-23 RX ADMIN — ACETAMINOPHEN 975 MG: 325 TABLET, FILM COATED ORAL at 22:45

## 2024-01-23 RX ADMIN — ONDANSETRON 4 MG: 2 INJECTION INTRAMUSCULAR; INTRAVENOUS at 09:36

## 2024-01-23 RX ADMIN — ACETAMINOPHEN 975 MG: 325 TABLET, FILM COATED ORAL at 13:00

## 2024-01-23 RX ADMIN — FAMOTIDINE 20 MG: 20 TABLET, FILM COATED ORAL at 10:29

## 2024-01-23 RX ADMIN — MORPHINE SULFATE 4 MG: 4 INJECTION INTRAVENOUS at 09:36

## 2024-01-23 RX ADMIN — SODIUM CHLORIDE 3 G: 9 INJECTION, SOLUTION INTRAVENOUS at 22:45

## 2024-01-23 RX ADMIN — SODIUM CHLORIDE 3 G: 9 INJECTION, SOLUTION INTRAVENOUS at 17:10

## 2024-01-23 RX ADMIN — SODIUM CHLORIDE 3 G: 9 INJECTION, SOLUTION INTRAVENOUS at 03:37

## 2024-01-23 RX ADMIN — ARIPIPRAZOLE 10 MG: 10 TABLET ORAL at 13:00

## 2024-01-23 RX ADMIN — Medication 10 MG: at 04:15

## 2024-01-23 RX ADMIN — LAMOTRIGINE 200 MG: 100 TABLET ORAL at 10:29

## 2024-01-23 RX ADMIN — HYDRALAZINE HYDROCHLORIDE 10 MG: 20 INJECTION, SOLUTION INTRAMUSCULAR; INTRAVENOUS at 03:37

## 2024-01-23 RX ADMIN — SODIUM CHLORIDE 3 G: 9 INJECTION, SOLUTION INTRAVENOUS at 10:29

## 2024-01-23 RX ADMIN — SODIUM CHLORIDE 75 ML/HR: 0.9 INJECTION, SOLUTION INTRAVENOUS at 17:52

## 2024-01-23 NOTE — ASSESSMENT & PLAN NOTE
Presented with right facial swelling, reports dry mouth a few days prior  Right-sided sialoadenitis involving both the right parotid and submandibular glands with adjacent associated facial cellulitis.   Appreciate ENT consultation  Continue Unasyn  Continue warm compress to face  Continue pain control   Dapsone Counseling: I discussed with the patient the risks of dapsone including but not limited to hemolytic anemia, agranulocytosis, rashes, methemoglobinemia, kidney failure, peripheral neuropathy, headaches, GI upset, and liver toxicity.  Patients who start dapsone require monitoring including baseline LFTs and weekly CBCs for the first month, then every month thereafter.  The patient verbalized understanding of the proper use and possible adverse effects of dapsone.  All of the patient's questions and concerns were addressed.

## 2024-01-23 NOTE — ASSESSMENT & PLAN NOTE
Meets criteria with tachycardia, leukocytosis 26,000  Facial cellulitis is source  Continue Unasyn  Trend fever curve, leukocytosis

## 2024-01-23 NOTE — PROGRESS NOTES
"Per patient morphine is making her feel \"loopy.\" Upon assessment, patient alert and oriented to person, place, time, and situation. Per patient she doesn't want oxycodone and dilaudid because they make her feel nauseous. ZEKE Eldridge made aware.  "

## 2024-01-23 NOTE — PROGRESS NOTES
"Patient complaining of having a difficult time breathing at times due to swelling and \"panicking\" per pt. ZEKE Eldridge made aware and will come see patient.  "

## 2024-01-23 NOTE — ED PROVIDER NOTES
History  Chief Complaint   Patient presents with    Facial Swelling     Patient with facial swelling and pain to the right side of her jaw      Patient is a 73-year-old female presenting for evaluation of acute onset right-sided facial swelling x 1 day.  Patient reports that symptoms started yesterday however today acutely worsened and has been having large area of swelling to the right side of the face that is extremely painful.  Also having difficulty opening the mouth.  No difficulty handling secretions.  No short of breath throat pain throat swelling change of voice wheezing chest pain abdominal pain nausea vomiting or any other complaints.  No recent dental procedures dental pain.  No neck pain back pain.  Denies any other complaints          Prior to Admission Medications   Prescriptions Last Dose Informant Patient Reported? Taking?   ARIPiprazole (ABILIFY) 10 mg tablet  Self Yes No   Sig: Take 10 mg by mouth daily   DULoxetine (CYMBALTA) 60 mg delayed release capsule  Self Yes No   Sig: Take by mouth daily   LORazepam (ATIVAN) 1 mg tablet  Self Yes No   Sig: Take 1 mg by mouth 3 (three) times a day as needed   acetaminophen (TYLENOL) 500 mg tablet   Yes No   Sig: Take 500 mg by mouth every 6 (six) hours as needed for mild pain   buPROPion (WELLBUTRIN XL) 150 mg 24 hr tablet  Self Yes No   Sig: Take 150 mg by mouth in the morning 2 tablets   docusate sodium (COLACE) 100 mg capsule  Self Yes No   famotidine (PEPCID) 20 mg tablet  Self Yes No   Sig: Take by mouth   lamoTRIgine (LaMICtal) 200 MG tablet  Self Yes No   Sig: Take by mouth daily   naloxone (NARCAN) 4 mg/0.1 mL nasal spray  Self No No   Sig: Administer 1 spray into a nostril. If no response after 2-3 minutes, give another dose in the other nostril using a new spray.   Patient not taking: Reported on 10/7/2021   oxybutynin (DITROPAN XL) 15 MG 24 hr tablet   Yes No   Sig: Take 15 mg by mouth daily   senna (SENOKOT) 8.6 mg  Self Yes No   Sig: Take by  mouth daily      Facility-Administered Medications: None       Past Medical History:   Diagnosis Date    Anxiety     BiPAP (biphasic positive airway pressure) dependence     Breast cyst     Left    Chronic kidney disease     Colon polyp     CPAP (continuous positive airway pressure) dependence     Depression     Elevated liver enzymes     GERD (gastroesophageal reflux disease)     Hepatitis B 1974    Hyperlipidemia     Infectious viral hepatitis     Hep B 1974    Sleep apnea     Uses bipap       Past Surgical History:   Procedure Laterality Date    BREAST BIOPSY Right 08/06/2021    intraductal papiloma    BREAST LUMPECTOMY Right 09/21/2021    Procedure: HALEY  DIRECTED LUMPECTOMY;  Surgeon: Andreina Rendon MD;  Location: [facility];  Service: Surgical Oncology    CATARACT EXTRACTION      COLONOSCOPY      HAND LIGAMENT RECONSTRUCTION Right     JOINT REPLACEMENT      MRI BREAST BIOPSY RIGHT (ALL INCLUSIVE) Right 08/06/2021    ID ARTHRP KNE CONDYLE&PLATU MEDIAL&LAT COMPARTMENTS Left 09/21/2022    Procedure: TOTAL KNEE REPLACEMENT;  Surgeon: Roman Riley MD;  Location: [facility];  Service: Orthopedics     BREAST HALEY  NEEDLE LOC RIGHT Right 09/13/2021       Family History   Problem Relation Age of Onset    Colon cancer Mother 46    Emphysema Father     Breast cancer Sister 65    No Known Problems Sister     No Known Problems Maternal Grandmother     No Known Problems Maternal Grandfather     No Known Problems Paternal Grandmother     No Known Problems Paternal Grandfather     No Known Problems Brother     No Known Problems Brother     No Known Problems Brother     No Known Problems Brother     No Known Problems Maternal Aunt     No Known Problems Maternal Aunt     No Known Problems Paternal Aunt     No Known Problems Paternal Aunt      I have reviewed and agree with the history as documented.    E-Cigarette/Vaping    E-Cigarette Use Never User      E-Cigarette/Vaping Substances    Nicotine No     THC No      CBD No     Flavoring No     Other No     Unknown No      Social History     Tobacco Use    Smoking status: Former     Current packs/day: 0.00     Types: Cigarettes     Quit date:      Years since quittin.0    Smokeless tobacco: Former   Vaping Use    Vaping status: Never Used   Substance Use Topics    Alcohol use: Yes     Comment: rarely    Drug use: No        Review of Systems   Constitutional:  Negative for chills and fever.   HENT:  Positive for facial swelling. Negative for ear pain, sore throat, trouble swallowing and voice change.    Eyes:  Negative for pain and visual disturbance.   Respiratory:  Negative for cough and shortness of breath.    Cardiovascular:  Negative for chest pain and palpitations.   Gastrointestinal:  Negative for abdominal pain and vomiting.   Genitourinary:  Negative for dysuria and hematuria.   Musculoskeletal:  Negative for arthralgias and back pain.   Skin:  Negative for color change and rash.   Neurological:  Negative for seizures and syncope.   All other systems reviewed and are negative.      Physical Exam  ED Triage Vitals   Temperature Pulse Respirations Blood Pressure SpO2   24 1814 24 1814 24 1814 24 18124 181   98.1 °F (36.7 °C) (!) 112 20 142/88 100 %      Temp Source Heart Rate Source Patient Position - Orthostatic VS BP Location FiO2 (%)   24 1814 24 18124 18124 --   Temporal Monitor Sitting Right arm       Pain Score       24       8             Orthostatic Vital Signs  Vitals:    24   BP: 142/88   Pulse: (!) 112   Patient Position - Orthostatic VS: Sitting       Physical Exam  Vitals and nursing note reviewed.   Constitutional:       General: She is not in acute distress.     Appearance: She is well-developed.   HENT:      Head: Normocephalic and atraumatic.      Jaw: Trismus and swelling present.      Comments: Large area of erythematous induration to the right side of the  face overlying the parotid gland is exquisitely tender to palpation.  Minimal amount of trismus however the oropharynx is clear clear phonation.  Mucous membranes are extremely dry  Eyes:      Conjunctiva/sclera: Conjunctivae normal.   Cardiovascular:      Rate and Rhythm: Normal rate and regular rhythm.      Heart sounds: No murmur heard.  Pulmonary:      Effort: Pulmonary effort is normal. No respiratory distress.      Breath sounds: Normal breath sounds.   Abdominal:      Palpations: Abdomen is soft.      Tenderness: There is no abdominal tenderness.   Musculoskeletal:         General: No swelling.      Cervical back: Neck supple.   Skin:     General: Skin is warm and dry.      Capillary Refill: Capillary refill takes less than 2 seconds.   Neurological:      Mental Status: She is alert.   Psychiatric:         Mood and Affect: Mood normal.         ED Medications  Medications   buPROPion (WELLBUTRIN XL) 24 hr tablet 150 mg (has no administration in time range)   ARIPiprazole (ABILIFY) tablet 10 mg (has no administration in time range)   acetaminophen (TYLENOL) tablet 975 mg (975 mg Oral Given 1/22/24 2130)   docusate sodium (COLACE) capsule 100 mg (100 mg Oral Given 1/22/24 2130)   DULoxetine (CYMBALTA) delayed release capsule 60 mg (has no administration in time range)   famotidine (PEPCID) tablet 20 mg (has no administration in time range)   lamoTRIgine (LaMICtal) tablet 200 mg (has no administration in time range)   LORazepam (ATIVAN) tablet 1 mg (has no administration in time range)   oxybutynin (DITROPAN-XL) 24 hr tablet 15 mg (has no administration in time range)   ampicillin-sulbactam (UNASYN) 3 g in sodium chloride 0.9 % 100 mL IVPB (has no administration in time range)   HYDROmorphone HCl (DILAUDID) injection 0.2 mg (has no administration in time range)   enoxaparin (LOVENOX) subcutaneous injection 40 mg (has no administration in time range)   morphine injection 4 mg (4 mg Intravenous Given 1/22/24 2007)    sodium chloride 0.9 % bolus 1,000 mL (1,000 mL Intravenous New Bag 1/22/24 2007)   iohexol (OMNIPAQUE) 350 MG/ML injection (MULTI-DOSE) 85 mL (85 mL Intravenous Given 1/22/24 2033)   ampicillin-sulbactam (UNASYN) 3 g in sodium chloride 0.9 % 100 mL IVPB (3 g Intravenous New Bag 1/22/24 2123)   HYDROmorphone (DILAUDID) injection 0.5 mg (0.5 mg Intravenous Given 1/22/24 2129)       Diagnostic Studies  Results Reviewed       Procedure Component Value Units Date/Time    UA (URINE) with reflex to Scope [[id number]]     Lab Status: No result Specimen: Urine     Basic metabolic panel [[id number]]  (Abnormal) Collected: 01/22/24 1857    Lab Status: Final result Specimen: Blood from Arm, Left Updated: 01/22/24 1957     Sodium 137 mmol/L      Potassium 3.8 mmol/L      Chloride 102 mmol/L      CO2 25 mmol/L      ANION GAP 10 mmol/L      BUN 12 mg/dL      Creatinine 0.95 mg/dL      Glucose 157 mg/dL      Calcium 9.3 mg/dL      eGFR 60 ml/min/1.73sq m     Narrative:      National Kid[employer] Foundation guidelines for Chronic Kidney Disease (CKD):     Stage 1 with normal or high GFR (GFR > 90 mL/min/1.73 square meters)    Stage 2 Mild CKD (GFR = 60-89 mL/min/1.73 square meters)    Stage 3A Moderate CKD (GFR = 45-59 mL/min/1.73 square meters)    Stage 3B Moderate CKD (GFR = 30-44 mL/min/1.73 square meters)    Stage 4 Severe CKD (GFR = 15-29 mL/min/1.73 square meters)    Stage 5 End Stage CKD (GFR <15 mL/min/1.73 square meters)  Note: GFR calculation is accurate only with a steady state creatinine    CBC and differential [[id number]]  (Abnormal) Collected: 01/22/24 1857    Lab Status: Final result Specimen: Blood from Arm, Left Updated: 01/22/24 1909     WBC 20.20 Thousand/uL      RBC 5.36 Million/uL      Hemoglobin 15.0 g/dL      Hematocrit 46.9 %      MCV 88 fL      MCH 28.0 pg      MCHC 32.0 g/dL      RDW 13.8 %      MPV 9.1 fL      Platelets 363 Thousands/uL      nRBC 0 /100 WBCs      Neutrophils Relative 82 %      Immat  GRANS % 1 %      Lymphocytes Relative 12 %      Monocytes Relative 5 %      Eosinophils Relative 0 %      Basophils Relative 0 %      Neutrophils Absolute 16.39 Thousands/µL      Immature Grans Absolute 0.11 Thousand/uL      Lymphocytes Absolute 2.45 Thousands/µL      Monocytes Absolute 1.09 Thousand/µL      Eosinophils Absolute 0.09 Thousand/µL      Basophils Absolute 0.07 Thousands/µL                    CT soft tissue neck   Final Result by Ravin Pan MD (01/22 2043)         1. Right-sided sialoadenitis involving both the right parotid and submandibular glands with adjacent associated facial cellulitis. No discrete collection. No definite sialolith detected. Infectious or inflammatory processes should be excluded especially    since 2 discrete salivary glands are involved.         Workstation performed: [id number]               Procedures  Procedures      ED Course  ED Course as of 01/22/24 2209 Mon Jan 22, 2024 2107 Will admit for IV abx TT sent to [facility].                                        Medical Decision Making  Patient is a 73-year-old female presenting for evaluation of right-sided facial swelling    Differential parotitis versus cellulitis versus deep space tissue infection    Plan labs CT soft tissue neck with IV contrast analgesia    CT read as above.  Will order IV Unasyn and admit    Discussed with medicine accepts patient for admission.  Hemodynamically stable at the time of admission no airway compromise remains clear.    Amount and/or Complexity of Data Reviewed  Labs: ordered.  Radiology: ordered.    Risk  Prescription drug management.  Decision regarding hospitalization.          Disposition  Final diagnoses:   Facial swelling   Acute sialoadenitis     Time reflects when diagnosis was documented in both MDM as applicable and the Disposition within this note       Time User Action Codes Description Comment    1/22/2024  8:53 PM Wali Blackwell Add [R22.0] Facial swelling      1/22/2024  8:53 PM Wali Blackwell Add [K11.21] Acute sialoadenitis           ED Disposition       ED Disposition   Admit    Condition   Stable    Date/Time   Mon Jan 22, 2024  8:53 PM    Comment   --             Follow-up Information    None         Patient's Medications   Discharge Prescriptions    No medications on file     No discharge procedures on file.    PDMP Review       None             ED Provider  Attending physically available and evaluated Jeanne Lyon. I managed the patient along with the ED Attending.    Electronically Signed by           Wali Blackwell DO  01/22/24 2218

## 2024-01-23 NOTE — CONSULTS
OTOLARYNGOLOGY CONSULT    Date of Service: 1/23/2024    Reason for consult: R parotitis     ASSESSMENT/PLAN:    -given clinical hx and PE, sialoadenitis at top of ddx  -cannot rule out underlying inflammatory process given pt's endorsement of chronic xerostomia  -continue abx  -encourage hydration  -frequent massage over salivary glands, warm compress  -recommend admission due to multi-site involvement for obs  -rest of care per primary    HPI  Jeanne Lyon is a 73 y.o. female w/ no sig PMH besides MDD, chronic back pain who presented to ED w/ 1d hx of R facial pain. Pt has never had siolithiasis or sioloadenitis prior. No sig dental concerns. Pt began having some SoB and pill dysphagia in ED, new to her. Pt does endorse chronic xerostomia at home.    LABORATORY  WBC (1/23/24): 26.23, on unasyn    RADIOLOGY  CT neck (1/22/24): Right-sided sialoadenitis involving both the right parotid and submandibular glands with adjacent associated facial cellulitis. No discrete collection. No definite sialolith detected     PROCEDURES  -    CURRENT HOSPITAL MEDICATIONS  Current Facility-Administered Medications   Medication Dose Route Frequency Provider Last Rate Last Admin    acetaminophen (TYLENOL) tablet 975 mg  975 mg Oral Q8H [facility] Jack Menchaca MD   975 mg at 01/22/24 2130    ampicillin-sulbactam (UNASYN) 3 g in sodium chloride 0.9 % 100 mL IVPB  3 g Intravenous Q6H Jack Menchaca MD   Stopped at 01/23/24 0425    ARIPiprazole (ABILIFY) tablet 10 mg  10 mg Oral Daily Jakc Menchaca MD        buPROPion (WELLBUTRIN XL) 24 hr tablet 150 mg  150 mg Oral Daily Jack Menchaca MD        docusate sodium (COLACE) capsule 100 mg  100 mg Oral BID Jack Menchaca MD   100 mg at 01/22/24 2130    DULoxetine (CYMBALTA) delayed release capsule 60 mg  60 mg Oral Daily Jack Menchaca MD        enoxaparin (LOVENOX) subcutaneous injection 40 mg  40 mg Subcutaneous Daily Jack Menchaca MD   40 mg at  01/23/24 0820    famotidine (PEPCID) tablet 20 mg  20 mg Oral Daily Jack Menchaca MD        hydrALAZINE (APRESOLINE) injection 10 mg  10 mg Intravenous Q4H PRN Jack Menchaca MD   10 mg at 01/23/24 0821    lamoTRIgine (LaMICtal) tablet 200 mg  200 mg Oral Daily Jack Menchaca MD        LORazepam (ATIVAN) tablet 1 mg  1 mg Oral TID PRN Jack Menchaca MD        morphine injection 2 mg  2 mg Intravenous Q3H PRN Jordyn S Chente, CRNP        morphine injection 4 mg  4 mg Intravenous Q3H PRN Jordyn S Chente, CRNP        ondansetron (ZOFRAN) injection 4 mg  4 mg Intravenous Q6H PRN Jordyn S Chente, CRNP        oxybutynin (DITROPAN-XL) 24 hr tablet 15 mg  15 mg Oral Daily Jack Menchaca MD           REVIEW OF SYSTEMS  As above    HISTORIES  PMH:  Past Medical History:   Diagnosis Date    Anxiety     BiPAP (biphasic positive airway pressure) dependence     Breast cyst     Left    Chronic kidney disease     Colon polyp     CPAP (continuous positive airway pressure) dependence     Depression     Elevated liver enzymes     GERD (gastroesophageal reflux disease)     Hepatitis B 1974    Hyperlipidemia     Infectious viral hepatitis     Hep B 1974    Sleep apnea     Uses bipap       PSH:  Past Surgical History:   Procedure Laterality Date    BREAST BIOPSY Right 08/06/2021    intraductal papiloma    BREAST LUMPECTOMY Right 09/21/2021    Procedure: HALEY  DIRECTED LUMPECTOMY;  Surgeon: Andreina Rendon MD;  Location: [facility];  Service: Surgical Oncology    CATARACT EXTRACTION      COLONOSCOPY      HAND LIGAMENT RECONSTRUCTION Right     JOINT REPLACEMENT      MRI BREAST BIOPSY RIGHT (ALL INCLUSIVE) Right 08/06/2021    [place] ARTHRP KNE CONDYLE&PLATU MEDIAL&LAT COMPARTMENTS Left 09/21/2022    Procedure: TOTAL KNEE REPLACEMENT;  Surgeon: Roman Riley MD;  Location: [facility];  Service: Orthopedics     BREAST HALEY  NEEDLE LOC RIGHT Right 09/13/2021       SH:  Social History     Tobacco Use    Smoking  status: Former     Current packs/day: 0.00     Types: Cigarettes     Quit date:      Years since quittin.0    Smokeless tobacco: Former   Vaping Use    Vaping status: Never Used   Substance Use Topics    Alcohol use: Yes     Comment: rarely    Drug use: No       FH:  Family History   Problem Relation Age of Onset    Colon cancer Mother 46    Emphysema Father     Breast cancer Sister 65    No Known Problems Sister     No Known Problems Maternal Grandmother     No Known Problems Maternal Grandfather     No Known Problems Paternal Grandmother     No Known Problems Paternal Grandfather     No Known Problems Brother     No Known Problems Brother     No Known Problems Brother     No Known Problems Brother     No Known Problems Maternal Aunt     No Known Problems Maternal Aunt     No Known Problems Paternal Aunt     No Known Problems Paternal Aunt        ALLERGIES:  Allergies   Allergen Reactions    Sulfa Antibiotics Rash       PHYSICAL EXAM  Visit Vitals  BP (!) 176/103 Comment: Caryn made aware   Pulse 89   Temp 97.5 °F (36.4 °C)   Resp 16   SpO2 95%   OB Status Postmenopausal   Smoking Status Former       General: NAD, Aox4  Ears: External ears normal in appearance, L ear canal normal, TM normal appearance, no fluid. R ear canal swollen, TM normal appearance, no fluid  Nose: External appearance normal  Oral cavity: mild trismus due to discomfort, dry mucosa, purulent dc expressed from R Ameena's duct w/ manual massage over parotid. R FoM TTP  Neck: Trachea is midline, no thyroid nodules, R neck TTP  Lymph: No cervical lymphadenopathy  Skin: No obvious facial lesions  Neuro: Motor and sensory grossly intact, face symmetrical, no obvious cranial nerve palsies, motor and sensory grossly intact, no focal deficits.   Lungs: Normal work of breathing, symmetrical chest expansion  Vascular: Well perfused    Patient Active Problem List    Diagnosis Date Noted    Acute sialoadenitis 2024    ANA (obstructive sleep  apnea) 12/04/2023    GERD (gastroesophageal reflux disease)     Primary osteoarthritis of left knee 09/21/2022    Anxiety     Depression     Bloody discharge from left nipple 11/11/2021    Breast lump or mass     Intraductal papilloma of breast, right 08/12/2021    Obesity, Class I, BMI 30-34.9 06/17/2019    Screening for diabetes mellitus 06/17/2019    Screening for thyroid disorder 06/17/2019    Abnormal weight gain 06/17/2019    Sleep apnea treated with nocturnal BiPAP     Complex sleep apnea syndrome        Kobi Conroy MD  PGY-2  Otolaryngology - Head and Neck Surgery  1/23/2024 9:22 AM

## 2024-01-23 NOTE — PROGRESS NOTES
[facility]  Progress Note  Name: Jeanne Lyon I  MRN: [id number]  Unit/Bed#: [facility] 926-01 I Date of Admission: 1/22/2024   Date of Service: 1/23/2024 I Hospital Day: 1    Assessment/Plan   * Acute sialoadenitis  Assessment & Plan  Presented with right facial swelling, reports dry mouth a few days prior  Right-sided sialoadenitis involving both the right parotid and submandibular glands with adjacent associated facial cellulitis.   Appreciate ENT consultation  Continue Unasyn  Continue warm compress to face  Continue pain control    Sepsis (HCC)  Assessment & Plan  Meets criteria with tachycardia, leukocytosis 26,000  Facial cellulitis is source  Continue Unasyn  Trend fever curve, leukocytosis    GERD (gastroesophageal reflux disease)  Assessment & Plan  Continue Pepcid 20 mg daily.    Anxiety  Assessment & Plan  On Ativan 1 mg 3 times daily as needed    Sleep apnea treated with nocturnal BiPAP  Assessment & Plan  Currently on BiPAP at night.         VTE Pharmacologic Prophylaxis:   High Risk (Score >/= 5) - Pharmacological DVT Prophylaxis Ordered: enoxaparin (Lovenox). Sequential Compression Devices Ordered.    Mobility:   Basic Mobility Inpatient Raw Score: 24  JH-HLM Goal: 8: Walk 250 feet or more  JH-HLM Achieved: 6: Walk 10 steps or more  HLM Goal NOT achieved. Continue with multidisciplinary rounding and encourage appropriate mobility to improve upon HLM goals.    Patient Centered Rounds: I performed bedside rounds with nursing staff today.     Education and Discussions with Family / Patient: Attempted to update  () via phone. Left voicemail.     Current Length of Stay: 1 day(s)  Current Patient Status: Inpatient   Certification Statement: The patient will continue to require additional inpatient hospital stay due to facial cellulitis  Discharge Plan: Anticipate discharge in 48-72 hrs to home.    Code Status: Level 1 - Full Code    Subjective:    Continues with right-sided facial swelling and pain.  Able to tolerate liquids.  Denies fever/chills.    Objective:     Vitals:   Temp (24hrs), Av °F (36.7 °C), Min:97.5 °F (36.4 °C), Max:98.5 °F (36.9 °C)    Temp:  [97.5 °F (36.4 °C)-98.5 °F (36.9 °C)] 98.5 °F (36.9 °C)  HR:  [] 93  Resp:  [14-20] 14  BP: (142-228)/() 143/94  SpO2:  [90 %-100 %] 97 %  There is no height or weight on file to calculate BMI.     Input and Output Summary (last 24 hours):     Intake/Output Summary (Last 24 hours) at 2024 1749  Last data filed at 2024 0425  Gross per 24 hour   Intake 200 ml   Output --   Net 200 ml       Physical Exam:   Physical Exam  Vitals and nursing note reviewed.   Constitutional:       General: She is not in acute distress.     Appearance: She is well-developed.   HENT:      Head:      Comments: Right facial swelling over parotid, surrounding erythema and tenderness palpation     Mouth/Throat:      Mouth: Mucous membranes are moist.   Eyes:      Conjunctiva/sclera: Conjunctivae normal.   Cardiovascular:      Rate and Rhythm: Normal rate and regular rhythm.      Heart sounds: No murmur heard.  Pulmonary:      Effort: Pulmonary effort is normal. No respiratory distress.      Breath sounds: Normal breath sounds.   Abdominal:      Palpations: Abdomen is soft.      Tenderness: There is no abdominal tenderness.   Musculoskeletal:         General: No swelling.      Cervical back: Neck supple.   Skin:     General: Skin is warm and dry.      Capillary Refill: Capillary refill takes less than 2 seconds.   Neurological:      General: No focal deficit present.      Mental Status: She is alert and oriented to person, place, and time.   Psychiatric:         Mood and Affect: Mood normal.         Behavior: Behavior normal.          Additional Data:     Labs:  Results from last 7 days   Lab Units 24  0422 24  1857   WBC Thousand/uL 26.23* 20.20*   HEMOGLOBIN g/dL 14.8 15.0   HEMATOCRIT %  Patient 46.6* 46.9*   PLATELETS Thousands/uL 315 363   NEUTROS PCT %  --  82*   LYMPHS PCT %  --  12*   LYMPHO PCT % 8*  --    MONOS PCT %  --  5   MONO PCT % 4  --    EOS PCT % 0 0     Results from last 7 days   Lab Units 01/23/24  0638   SODIUM mmol/L 134*   POTASSIUM mmol/L 5.4*   CHLORIDE mmol/L 101   CO2 mmol/L 23   BUN mg/dL 8   CREATININE mg/dL 0.78   ANION GAP mmol/L 10   CALCIUM mg/dL 8.7   ALBUMIN g/dL 4.1   TOTAL BILIRUBIN mg/dL 0.85   ALK PHOS U/L 135*   ALT U/L 8   AST U/L 21   GLUCOSE RANDOM mg/dL 161*                       Lines/Drains:  Invasive Devices       Peripheral Intravenous Line  Duration             Peripheral IV 01/22/24 Left Antecubital <1 day                          Imaging: Reviewed radiology reports from this admission including: CT neck    Recent Cultures (last 7 days):         Last 24 Hours Medication List:   Current Facility-Administered Medications   Medication Dose Route Frequency Provider Last Rate    acetaminophen  975 mg Oral Q8H BARBARA Jack Menchaca MD      ampicillin-sulbactam  3 g Intravenous Q6H Jack Menchaca MD 3 g (01/23/24 1710)    ARIPiprazole  10 mg Oral Daily Jack Menchaca MD      buPROPion  150 mg Oral Daily Jack Menchaca MD      docusate sodium  100 mg Oral BID Jack Menchaca MD      DULoxetine  60 mg Oral Daily Jack Menchaca MD      enoxaparin  40 mg Subcutaneous Daily Jack Menchaca MD      famotidine  20 mg Oral Daily Jack Menchaca MD      hydrALAZINE  10 mg Intravenous Q4H PRN Jack Menchaca MD      lamoTRIgine  200 mg Oral Daily Jack Menchaca MD      LORazepam  1 mg Oral TID PRN Jack Menchaca MD      morphine injection  2 mg Intravenous Q3H PRN Jordyn S Chente, CRNP      morphine injection  4 mg Intravenous Q3H PRN Jordyn S Chente, CRNP      ondansetron  4 mg Intravenous Q6H PRN Jordyn S Chente, CRNP      oxybutynin  15 mg Oral Daily Jack Menchaca MD      sodium chloride  75 mL/hr Intravenous  Continuous KERWIN Yuan          Today, Patient Was Seen By: KERWIN Yuan    **Please Note: This note may have been constructed using a voice recognition system.**

## 2024-01-23 NOTE — SPEECH THERAPY NOTE
Speech-Language Pathology Bedside Swallow Evaluation    Patient Name: Jeanne Lyon    Today's Date: 1/23/2024     Problem List  Principal Problem:    Acute sialoadenitis  Active Problems:    Sleep apnea treated with nocturnal BiPAP    Anxiety    GERD (gastroesophageal reflux disease)    Past Medical History  Past Medical History:   Diagnosis Date    Anxiety     BiPAP (biphasic positive airway pressure) dependence     Breast cyst     Left    Chronic kidney disease     Colon polyp     CPAP (continuous positive airway pressure) dependence     Depression     Elevated liver enzymes     GERD (gastroesophageal reflux disease)     Hepatitis B 1974    Hyperlipidemia     Infectious viral hepatitis     Hep B 1974    Sleep apnea     Uses bipap     Past Surgical History  Past Surgical History:   Procedure Laterality Date    BREAST BIOPSY Right 08/06/2021    intraductal papiloma    BREAST LUMPECTOMY Right 09/21/2021    Procedure: HALEY  DIRECTED LUMPECTOMY;  Surgeon: Andreina Rendon MD;  Location: [facility];  Service: Surgical Oncology    CATARACT EXTRACTION      COLONOSCOPY      HAND LIGAMENT RECONSTRUCTION Right     JOINT REPLACEMENT      MRI BREAST BIOPSY RIGHT (ALL INCLUSIVE) Right 08/06/2021    ID ARTHRP KNE CONDYLE&PLATU MEDIAL&LAT COMPARTMENTS Left 09/21/2022    Procedure: TOTAL KNEE REPLACEMENT;  Surgeon: Roman Riley MD;  Location: [facility];  Service: Orthopedics     BREAST HALEY  NEEDLE LOC RIGHT Right 09/13/2021       Summary  Pt presented with s/s suggestive of mild oral and suspected moderate pharyngeal dysphagia.  Symptoms or concerns included  reduced bolus manipulation and effortful swallow. Pt reported some discomfort with attempts to masticate ice and manipulate jello. She did have adequate control of thin liquids. Mild cough noted x1 with large swallows of thin. Single sips of thin, jello, and apple sauce were tolerated without s/s aspiration. Pt had significant stridor with initial  "repositioning, however this resolved and did not reoccur. Suspect swallowing will improve as swelling resolves. Okay to offer meds crushed in apple sauce. Pt is comfortable with clear liquid diet, continue same for now and ST will f/u for potential to advance.     Risk/s for Aspiration: mild    Recommended Diet:  clear liquid diet    Recommended Form of Meds: crushed with puree   Aspiration precautions and swallowing strategies: upright posture and slow rate of feeding, small sips  Other Recommendations: Continue frequent oral care - pt reported d/t her depression she tries to brush her teeth once a week, encouraged to increase frequency      Current Medical Status per H&P 1/22/24  Jeanne Lyon is a 73 y.o. female who has past medical history significant for anxiety, GERD, depression, obstructive sleep apnea primary osteoarthritis who comes to the emergency room with 1 day history of right-sided facial swelling.  Patient stated that 24 hours ago it started as a small ovoid mass demonstrating it with her fingers to be approximately 1-1/2 inch.  It quickly evolved to the current size of a large duck egg with noted pain and erythema difficulty eating solid foods.  She can drink.  She does not have any respiratory compromise.  She does not have dysphonia.  The CT of the head soft tissues showing right-sided sialoadenitis involving right parotid and submandibular glands with facial cellulitis.  Patient was then started on Unasyn.  Currently, patient is comfortable although stated that the morphine is \"starting to wear off\".  Otherwise she is able to speak in sentences with no distress and no dysphonia with clarity.    Special Studies:  CT soft tissue neck 1/22/24 IMPRESSION:  1. Right-sided sialoadenitis involving both the right parotid and submandibular glands with adjacent associated facial cellulitis. No discrete collection. No definite sialolith detected. Infectious or inflammatory processes should be excluded " especially   since 2 discrete salivary glands are involved.    Social/Education/Vocational Hx:  Pt lives  with her     Swallow Information   Current Risks for Dysphagia & Aspiration:  pt report of dysphagia d/t sialoadentitis  Current Symptoms/Concerns:  feeling of food sticking/not going down  Current Diet:  clears    Baseline Diet: regular diet and thin liquids      Baseline Assessment   Behavior/Cognition: alert  Speech/Language Status: able to participate in conversation and able to follow commands  Patient Positioning: upright in bed  Pain Status/Interventions/Response to Interventions: No report of or nonverbal indications of pain.       Swallow Mechanism Exam  Facial:  Significant R side swellling  Labial: decreased ROM right side  Lingual: right sided tongue deviation  Velum: asymmetrical  Mandible:  decreased ROM  Dentition: adequate  Vocal quality:clear/adequate   Volitional Cough:  adequate    Respiratory Status: on RA     Consistencies Assessed and Performance   Consistencies Administered: ice chips, thin liquids, puree, and jello    Oral Stage: mild and decreased bolus manipulation d/t report of facial pain    Pharyngeal Stage: moderate, multiple swallows, and effortful swallow     Esophageal Concerns:  hx GERD      Summary and Recommendations (see above)    Results Reviewed with: patient and RN     Treatment Recommended: yes     Frequency of treatment: 2-3x/week      Dysphagia LTG  -Patient will demonstrate safe and effective oral intake (without overt s/s significant oral/pharyngeal dysphagia including s/s penetration or aspiration) for the highest appropriate diet level.     Short Term Goals:  -Pt will tolerate the least restrictive diet with the least restrictive liquid consistency without s/s of aspiration x72hrs.    -Patient will tolerate trials of upgraded food and/or liquid texture with no significant s/s of oral or pharyngeal dysphagia including aspiration across 1-3 diagnostic sessions.

## 2024-01-23 NOTE — PLAN OF CARE
Problem: SAFETY ADULT  Goal: Patient will remain free of falls  Description: INTERVENTIONS:  - Educate patient/family on patient safety including physical limitations  - Instruct patient to call for assistance with activity   - Consult OT/PT to assist with strengthening/mobility   - Keep Call bell within reach  - Keep bed low and locked with side rails adjusted as appropriate  - Keep care items and personal belongings within reach  - Initiate and maintain comfort rounds  - Make Fall Risk Sign visible to staff  - Offer Toileting every 2 Hours, in advance of need  - Initiate/Maintain bed alarm  - Obtain necessary fall risk management equipment: alarm, grippy socks  - Apply yellow socks and bracelet for high fall risk patients  - Consider moving patient to room near nurses station  Outcome: Progressing

## 2024-01-23 NOTE — CASE MANAGEMENT
Case Management Assessment & Discharge Planning Note    Patient name Jeanne Lyon  Location [facility] 926/[facility] 926-01 MRN [id number]  : 1950 Date 2024       Current Admission Date: 2024  Current Admission Diagnosis:Acute sialoadenitis   Patient Active Problem List    Diagnosis Date Noted    Acute sialoadenitis 2024    ANA (obstructive sleep apnea) 2023    GERD (gastroesophageal reflux disease)     Primary osteoarthritis of left knee 2022    Anxiety     Depression     Bloody discharge from left nipple 2021    Breast lump or mass     Intraductal papilloma of breast, right 2021    Obesity, Class I, BMI 30-34.9 2019    Screening for diabetes mellitus 2019    Screening for thyroid disorder 2019    Abnormal weight gain 2019    Sleep apnea treated with nocturnal BiPAP     Complex sleep apnea syndrome       LOS (days): 1  Geometric Mean LOS (GMLOS) (days): 3.2  Days to GMLOS:2.5     OBJECTIVE:    Risk of Unplanned Readmission Score: 10.58         Current admission status: Inpatient       Preferred Pharmacy:   [id number] - [address] [address]  Phone: [phone] Fax: [phone]    Primary Care Provider: Chris Monique DO    Primary Insurance: MEDICARE  Secondary Insurance: STATE FARM INSURANCE    ASSESSMENT:  Active Health Care Proxies       Julio Cesar Lyon Health Care Representative - Spouse   Primary Phone: [phone] (Mobile)  Home Phone: [phone]                 Readmission Root Cause  30 Day Readmission: No    Patient Information  Admitted from:: Home  Mental Status: Alert  During Assessment patient was accompanied by: Not accompanied during assessment  Assessment information provided by:: Patient  Primary Caregiver: Self  Support Systems: Spouse/significant other  County of Residence: [place]  What city do you live in?: [place]  Home entry access options. Select all  that apply.: No steps to enter home  Type of Current Residence: Apartment  Floor Level: 1  Upon entering residence, is there a bedroom on the main floor (no further steps)?: Yes  Upon entering residence, is there a bathroom on the main floor (no further steps)?: Yes  Living Arrangements: Lives w/ Spouse/significant other  Is patient a ?: No    Activities of Daily Living Prior to Admission  Functional Status: Independent  Completes ADLs independently?: Yes  Ambulates independently?: Yes  Does patient use assisted devices?: Yes  Assisted Devices (DME) used: Straight Cane (PRN)  Does patient currently own DME?: Yes  What DME does the patient currently own?: Straight Cane  Does patient have a history of Outpatient Therapy (PT/OT)?: Yes (SL)  Does the patient have a history of Short-Term Rehab?: No  Does patient have a history of HHC?: No  Does patient currently have HHC?: No         Patient Information Continued  Income Source: Pension/skilled nursing  Does patient have prescription coverage?: Yes  Does patient receive dialysis treatments?: No  Does patient have a history of substance abuse?: No  Does patient have a history of Mental Health Diagnosis?: Yes (Pt reports MDD & Bipolar)  Is patient receiving treatment for mental health?: Yes (Medication Management)         Means of Transportation  Means of Transport to Appts:: Drives Self      Housing Stability: Low Risk  (1/23/2024)    Housing Stability Vital Sign     Unable to Pay for Housing in the Last Year: No     Number of Places Lived in the Last Year: 1     Unstable Housing in the Last Year: No   Food Insecurity: No Food Insecurity (1/23/2024)    Hunger Vital Sign     Worried About Running Out of Food in the Last Year: Never true     Ran Out of Food in the Last Year: Never true   Transportation Needs: No Transportation Needs (1/23/2024)    PRAPARE - Transportation     Lack of Transportation (Medical): No     Lack of Transportation (Non-Medical): No   Utilities:  Not At Risk (1/23/2024)    [facility] Utilities     Threatened with loss of utilities: No       DISCHARGE DETAILS:    Discharge planning discussed with:: Patient  Freedom of Choice: Yes        Transport at Discharge : Family           Additional Comments: CM met with pt at bedside to complete open assessment. CM introduced self and explained role. Pt reported independent with ADLs. Pt lives in apartment on 1st floor with no ROLY. Pt reports using cane as needed. CM to follow for discharge planning needs.

## 2024-01-23 NOTE — ASSESSMENT & PLAN NOTE
Patient came in with 1 day of right swelling of the cheek extending to the surface of the facial skin with erythema and pain on palpation.  Read as nits on CT scan.  Will consult ENT for further opinion.  Started on Unasyn 3 g and to continue every 6 hours.  Reviewed CT scan result and did not mention any findings with potential for respiratory compromise.  Also patient does not demonstrate difficulty breathing or dysphonia.  Noted elevated white count at 20.2 with neutrophil predominance; will monitor CBC tomorrow.  Patient states that she cannot take something liquid by mouth.  Will place patient on clear liquids.

## 2024-01-23 NOTE — ED NOTES
Patient placed on 2L nasal cannula while sleeping due to O2 @ 90%     Yamilka Tong RN  01/22/24 4446

## 2024-01-23 NOTE — PROGRESS NOTES
Per patient, unable to swallow pills, only liquids at this time. Unable to take several morning medications.

## 2024-01-23 NOTE — H&P
[facility]  H&P  Name: Jeanne Lyon 73 y.o. female I MRN: [id number]  Unit/Bed#: ED 16 I Date of Admission: 1/22/2024   Date of Service: 1/22/2024 I Hospital Day: 0      Assessment/Plan   Acute sialoadenitis  Assessment & Plan  Patient came in with 1 day of right swelling of the cheek extending to the surface of the facial skin with erythema and pain on palpation.  Read as nits on CT scan.  Will consult ENT for further opinion.  Started on Unasyn 3 g and to continue every 6 hours.  Reviewed CT scan result and did not mention any findings with potential for respiratory compromise.  Also patient does not demonstrate difficulty breathing or dysphonia.  Noted elevated white count at 20.2 with neutrophil predominance; will monitor CBC tomorrow.  Patient states that she cannot take something liquid by mouth.  Will place patient on clear liquids.    GERD (gastroesophageal reflux disease)  Assessment & Plan  Continue Pepcid 20 mg daily.    Anxiety  Assessment & Plan  On Ativan 1 mg 3 times daily as needed    Sleep apnea treated with nocturnal BiPAP  Assessment & Plan  Currently on BiPAP at night.               VTE Prophylaxis: Enoxaparin (Lovenox)  / sequential compression device   Code Status: Level 1 - Full Code as discussed with patient.  Patient states although she has DNR at home, she would like to be full code for now.  POLST: There is no POLST form on file for this patient (pre-hospital)    Anticipated Length of Stay:  Patient will be admitted on an Inpatient basis with an anticipated length of stay of greater than 2 midnights.   Justification for Hospital Stay: Please see detailed plans noted above.    Chief Complaint:     Right facial swelling and erythema with pain  History of Present Illness:  Jeanne Lyon is a 73 y.o. female who has past medical history significant for anxiety, GERD, depression, obstructive sleep apnea primary osteoarthritis who comes to the  "emergency room with 1 day history of right-sided facial swelling.  Patient stated that 24 hours ago it started as a small ovoid mass demonstrating it with her fingers to be approximately 1-1/2 inch.  It quickly evolved to the current size of a large duck egg with noted pain and erythema difficulty eating solid foods.  She can drink.  She does not have any respiratory compromise.  She does not have dysphonia.  The CT of the head soft tissues showing right-sided sialoadenitis involving right parotid and submandibular glands with facial cellulitis.  Patient was then started on Unasyn.    Currently, patient is comfortable although stated that the morphine is \"starting to wear off\".  Otherwise she is able to speak in sentences with no distress and no dysphonia with clarity.    Review of Systems:    Constitutional:  Denies fever or chills   Eyes:  Denies change in visual acuity; right-sided facial swelling  HENT:  Denies nasal congestion or sore throat   Respiratory:  Denies cough or shortness of breath   Cardiovascular:  Denies chest pain or edema   GI:  Denies abdominal pain, nausea, vomiting, bloody stools or diarrhea   :  Denies dysuria   Musculoskeletal:  Denies back pain or joint pain   Integument:  Denies rash   Neurologic:  Denies headache, focal weakness or sensory changes   Endocrine:  Denies polyuria or polydipsia   Psychiatric:  Denies depression or anxiety     Past Medical and Surgical History:   Past Medical History:   Diagnosis Date    Anxiety     BiPAP (biphasic positive airway pressure) dependence     Breast cyst     Left    Chronic kidney disease     Colon polyp     CPAP (continuous positive airway pressure) dependence     Depression     Elevated liver enzymes     GERD (gastroesophageal reflux disease)     Hepatitis B 1974    Hyperlipidemia     Infectious viral hepatitis     Hep B 1974    Sleep apnea     Uses bipap     Past Surgical History:   Procedure Laterality Date    BREAST BIOPSY Right 08/06/2021 "    intraductal papiloma    BREAST LUMPECTOMY Right 2021    Procedure: HALEY  DIRECTED LUMPECTOMY;  Surgeon: Andreina Rendon MD;  Location: [facility];  Service: Surgical Oncology    CATARACT EXTRACTION      COLONOSCOPY      HAND LIGAMENT RECONSTRUCTION Right     JOINT REPLACEMENT      MRI BREAST BIOPSY RIGHT (ALL INCLUSIVE) Right 2021    CA ARTHRP KNE CONDYLE&PLATU MEDIAL&LAT COMPARTMENTS Left 2022    Procedure: TOTAL KNEE REPLACEMENT;  Surgeon: Roman Riley MD;  Location: [facility];  Service: Orthopedics     BREAST HALEY  NEEDLE LOC RIGHT Right 2021       Meds/Allergies:  (Not in a hospital admission)      Allergies:   Allergies   Allergen Reactions    Sulfa Antibiotics Rash     History:  Marital Status: /Civil Union   Occupation: Former nurse and retired 6 months ago  Patient Pre-hospital Living Situation: Lives at home with ; patient states that she does not have any recent sick contacts.  No exposure to sick individuals.  Patient Pre-hospital Level of Mobility: Ambulatory  Patient Pre-hospital Diet Restrictions: Regular.  Substance Use History:   Social History     Substance and Sexual Activity   Alcohol Use Yes    Comment: rarely     Social History     Tobacco Use   Smoking Status Former    Current packs/day: 0.00    Types: Cigarettes    Quit date:     Years since quittin.0   Smokeless Tobacco Former     Social History     Substance and Sexual Activity   Drug Use No       Family History:  Family History   Problem Relation Age of Onset    Colon cancer Mother 46    Emphysema Father     Breast cancer Sister 65    No Known Problems Sister     No Known Problems Maternal Grandmother     No Known Problems Maternal Grandfather     No Known Problems Paternal Grandmother     No Known Problems Paternal Grandfather     No Known Problems Brother     No Known Problems Brother     No Known Problems Brother     No Known Problems Brother     No Known Problems Maternal  Aunt     No Known Problems Maternal Aunt     No Known Problems Paternal Aunt     No Known Problems Paternal Aunt        Physical Exam:     Vitals:   Blood Pressure: 142/88 (01/22/24 1814)  Pulse: (!) 112 (01/22/24 1814)  Temperature: 98.1 °F (36.7 °C) (01/22/24 1814)  Temp Source: Temporal (01/22/24 1814)  Respirations: 20 (01/22/24 1814)  SpO2: 100 % (01/22/24 1814)    Constitutional:  Well developed, well nourished, no acute distress, non-toxic appearance   Eyes:  PERRL, conjunctiva normal   HENT:  Atraumatic, external ears normal, nose normal, oropharynx moist, difficulty opening mouth.  Right-sided facial swelling with discrete borders and tender to touch with erythema and warmth.  Neck- normal range of motion, no tenderness, supple   Respiratory:  No respiratory distress, normal breath sounds, no rales, no wheezing   Cardiovascular:  Normal rate, normal rhythm, no murmurs, no gallops, no rubs   GI:  Soft, nondistended, normal bowel sounds, nontender, no organomegaly, no mass, no rebound, no guarding   :  No costovertebral angle tenderness   Musculoskeletal:  No edema, no tenderness, no deformities. Back- no tenderness  Integument:  Well hydrated, no rash   Lymphatic:  No lymphadenopathy noted   Neurologic:  Alert &awake, communicative, CN 2-12 normal, normal motor function, normal sensory function, no focal deficits noted   Psychiatric:  Speech and behavior appropriate       Lab Results: I have personally reviewed pertinent reports.      Results from last 7 days   Lab Units 01/22/24  1857   WBC Thousand/uL 20.20*   HEMOGLOBIN g/dL 15.0   HEMATOCRIT % 46.9*   PLATELETS Thousands/uL 363   NEUTROS PCT % 82*   LYMPHS PCT % 12*   MONOS PCT % 5   EOS PCT % 0     Results from last 7 days   Lab Units 01/22/24  1857   POTASSIUM mmol/L 3.8   CHLORIDE mmol/L 102   CO2 mmol/L 25   BUN mg/dL 12   CREATININE mg/dL 0.95   CALCIUM mg/dL 9.3               Imaging: I have personally reviewed pertinent reports.      CT soft  tissue neck    Result Date: 1/22/2024  Narrative: CT NECK WITH CONTRAST INDICATION:   Parotid region mass R facial swelling and pain. COMPARISON:  None. TECHNIQUE:  Axial, sagittal, and coronal 2D reformatted images were created from the axial source data and submitted for interpretation. Radiation dose length product (DLP) for this visit:  580 mGy-cm .  This examination, like all CT scans performed in the [facility], was performed utilizing techniques to minimize radiation dose exposure, including the use of iterative reconstruction and automated exposure control. IV Contrast:  85 mL of iohexol (OMNIPAQUE) IMAGE QUALITY: Beam-hardening artifact from dental fillings limits local evaluation. FINDINGS: VISUALIZED BRAIN PARENCHYMA:  No acute intracranial pathology of the visualized brain parenchyma. VISUALIZED ORBITS: Bilateral lens implants noted PARANASAL SINUSES: Normal visualized paranasal sinuses. NASAL CAVITY AND NASOPHARYNX:  Normal. SUPRAHYOID NECK:  Normal oral cavity, tongue base, tonsillar fossa and epiglottis. Inflammatory changes in the right periparotid and submandibular regions noted. There is also bilateral right greater than left submandibular soft tissue swelling. INFRAHYOID NECK:  Aryepiglottic folds and piriform sinuses are normal.  Normal glottis and subglottic airway. THYROID GLAND: 1.4 cm nodule in the left lobe of the thyroid gland. Incidental discovery of one or more thyroid nodule(s) measuring less than 1.5 cm and without suspicious features is noted in this patient who is above 35 years old; according to guidelines published in the February 2015 white paper on incidental thyroid nodules in the Journal of the American College of Radiology (JACR), no further evaluation is recommended. PAROTID AND SUBMANDIBULAR GLANDS: Right parotid and submandibular gland are both edematous and enlarged and hyperemic. Left submandibular gland and parotid glands are normal. No obvious ductal  dilatation or obvious sialolith detected. Adjacent surrounding densities in the fat planes around the parotid and submandibular glands on the right noted. Additionally there is bilateral right greater than left submandibular soft tissue swelling. No discrete collection. LYMPH NODES:  No pathologic or enlarged adenopathy. VASCULAR STRUCTURES:  Normal enhancement of the cervical vasculature. THORACIC INLET:  Lung apices and upper mediastinum are unremarkable. BONY STRUCTURES: Mild reversal of the cervical lordosis centered at the C3 level. Scattered spondylotic changes noted     Impression: 1. Right-sided sialoadenitis involving both the right parotid and submandibular glands with adjacent associated facial cellulitis. No discrete collection. No definite sialolith detected. Infectious or inflammatory processes should be excluded especially since 2 discrete salivary glands are involved. Workstation performed: [id number]         ** Please Note: Dragon 360 Dictation voice to text software was used in the creation of this document. **

## 2024-01-23 NOTE — ED ATTENDING ATTESTATION
1/22/2024  I, Jessica Barnes DO, saw and evaluated the patient. I have discussed the patient with the resident/non-physician practitioner and agree with the resident's/non-physician practitioner's findings, Plan of Care, and MDM as documented in the resident's/non-physician practitioner's note, except where noted. All available labs and Radiology studies were reviewed.  I was present for key portions of any procedure(s) performed by the resident/non-physician practitioner and I was immediately available to provide assistance.       At this point I agree with the current assessment done in the Emergency Department.  I have conducted an independent evaluation of this patient a history and physical is as follows:    73-year-old female presents with right-sided facial swelling.  Patient states that started yesterday but significantly worse today.  Having difficulty opening her mouth secondary to pain.  Is able to swallow liquids but having difficulty eating secondary to pain.  No fevers or chills.  Denies dental pain.  Denies history of similar episodes.  On exam-no acute distress, heart tachycardic, no respiratory distress, swelling on right side of her mouth that is firm to touch and tender, patient does have trismus, dry mucous membranes, submandibular area soft.  Plan-CT soft tissue neck with contrast, check basic labs, concern for infection of parotid gland or stone, dental infection, other infection in the jaw.     ED Course         Critical Care Time  Procedures

## 2024-01-24 PROBLEM — E87.6 HYPOKALEMIA: Status: ACTIVE | Noted: 2024-01-24

## 2024-01-24 LAB
ANION GAP SERPL CALCULATED.3IONS-SCNC: 14 MMOL/L
BUN SERPL-MCNC: 6 MG/DL (ref 5–25)
CALCIUM SERPL-MCNC: 9.3 MG/DL (ref 8.4–10.2)
CHLORIDE SERPL-SCNC: 100 MMOL/L (ref 96–108)
CO2 SERPL-SCNC: 25 MMOL/L (ref 21–32)
CREAT SERPL-MCNC: 0.74 MG/DL (ref 0.6–1.3)
ERYTHROCYTE [DISTWIDTH] IN BLOOD BY AUTOMATED COUNT: 13.8 % (ref 11.6–15.1)
GFR SERPL CREATININE-BSD FRML MDRD: 81 ML/MIN/1.73SQ M
GLUCOSE SERPL-MCNC: 108 MG/DL (ref 65–140)
HCT VFR BLD AUTO: 45.3 % (ref 34.8–46.1)
HGB BLD-MCNC: 14.5 G/DL (ref 11.5–15.4)
MCH RBC QN AUTO: 27.8 PG (ref 26.8–34.3)
MCHC RBC AUTO-ENTMCNC: 32 G/DL (ref 31.4–37.4)
MCV RBC AUTO: 87 FL (ref 82–98)
PLATELET # BLD AUTO: 363 THOUSANDS/UL (ref 149–390)
PMV BLD AUTO: 9.3 FL (ref 8.9–12.7)
POTASSIUM SERPL-SCNC: 3.2 MMOL/L (ref 3.5–5.3)
RBC # BLD AUTO: 5.22 MILLION/UL (ref 3.81–5.12)
SODIUM SERPL-SCNC: 139 MMOL/L (ref 135–147)
WBC # BLD AUTO: 27.61 THOUSAND/UL (ref 4.31–10.16)

## 2024-01-24 PROCEDURE — 80048 BASIC METABOLIC PNL TOTAL CA: CPT | Performed by: NURSE PRACTITIONER

## 2024-01-24 PROCEDURE — 99232 SBSQ HOSP IP/OBS MODERATE 35: CPT | Performed by: PHYSICIAN ASSISTANT

## 2024-01-24 PROCEDURE — 85027 COMPLETE CBC AUTOMATED: CPT | Performed by: NURSE PRACTITIONER

## 2024-01-24 PROCEDURE — 92526 ORAL FUNCTION THERAPY: CPT

## 2024-01-24 RX ORDER — POTASSIUM CHLORIDE 20 MEQ/1
40 TABLET, EXTENDED RELEASE ORAL ONCE
Status: COMPLETED | OUTPATIENT
Start: 2024-01-24 | End: 2024-01-24

## 2024-01-24 RX ADMIN — SODIUM CHLORIDE 75 ML/HR: 0.9 INJECTION, SOLUTION INTRAVENOUS at 22:44

## 2024-01-24 RX ADMIN — FAMOTIDINE 20 MG: 20 TABLET, FILM COATED ORAL at 09:46

## 2024-01-24 RX ADMIN — LAMOTRIGINE 200 MG: 100 TABLET ORAL at 09:44

## 2024-01-24 RX ADMIN — ARIPIPRAZOLE 10 MG: 10 TABLET ORAL at 09:48

## 2024-01-24 RX ADMIN — SODIUM CHLORIDE 3 G: 9 INJECTION, SOLUTION INTRAVENOUS at 23:53

## 2024-01-24 RX ADMIN — SODIUM CHLORIDE 3 G: 9 INJECTION, SOLUTION INTRAVENOUS at 11:13

## 2024-01-24 RX ADMIN — DOCUSATE SODIUM 100 MG: 100 CAPSULE, LIQUID FILLED ORAL at 18:12

## 2024-01-24 RX ADMIN — SODIUM CHLORIDE 3 G: 9 INJECTION, SOLUTION INTRAVENOUS at 18:13

## 2024-01-24 RX ADMIN — ENOXAPARIN SODIUM 40 MG: 40 INJECTION SUBCUTANEOUS at 09:49

## 2024-01-24 RX ADMIN — BUPROPION HYDROCHLORIDE 150 MG: 150 TABLET, FILM COATED, EXTENDED RELEASE ORAL at 09:43

## 2024-01-24 RX ADMIN — ACETAMINOPHEN 975 MG: 325 TABLET, FILM COATED ORAL at 05:22

## 2024-01-24 RX ADMIN — DOCUSATE SODIUM 100 MG: 100 CAPSULE, LIQUID FILLED ORAL at 09:46

## 2024-01-24 RX ADMIN — OXYBUTYNIN 15 MG: 10 TABLET, FILM COATED, EXTENDED RELEASE ORAL at 09:45

## 2024-01-24 RX ADMIN — DULOXETINE HYDROCHLORIDE 60 MG: 60 CAPSULE, DELAYED RELEASE ORAL at 09:47

## 2024-01-24 RX ADMIN — POTASSIUM CHLORIDE 40 MEQ: 1500 TABLET, EXTENDED RELEASE ORAL at 11:13

## 2024-01-24 RX ADMIN — MORPHINE SULFATE 2 MG: 2 INJECTION, SOLUTION INTRAMUSCULAR; INTRAVENOUS at 18:13

## 2024-01-24 RX ADMIN — SODIUM CHLORIDE 3 G: 9 INJECTION, SOLUTION INTRAVENOUS at 05:24

## 2024-01-24 RX ADMIN — ACETAMINOPHEN 975 MG: 325 TABLET, FILM COATED ORAL at 21:08

## 2024-01-24 RX ADMIN — SODIUM CHLORIDE 75 ML/HR: 0.9 INJECTION, SOLUTION INTRAVENOUS at 09:49

## 2024-01-24 NOTE — SPEECH THERAPY NOTE
"Speech-Language Pathology Progress Note    Patient Name: Jeanne Lyon    Today's Date: 1/24/2024    Subjective:  Pt was awake and alert. She was sitting upright in bed. Patient stated \"There is no pain today.\"  present at bedside.    Objective:  Pt was seen today for dysphagia therapy at request of advanced practitioner. Current diet is clear liquids. Pt was on room air. Focus of today's session was to maximize PO intake safety and determine potential for diet texture advancement. Textures offered today included puree, hard solid, and thin liquid via straw.  Swallow function:   Bolus retrieval and mastication were adequate.  Formation and AP transfer were WFL. Pharyngeal swallow initiation was mildly delayed. Hyolaryngeal excursion was mildly reduced. No s/s aspiration occurred during session today. Pt reported slight discomfort with initial swallow of cookie however subsequent swallows she reported no issues.     Assessment:  Swallow function is improving with current diet. Diet texture advancement is recommended at this time.  Pt has been eating sour foods and is encouraged to use warm compress on R side of face.     Plan:  Change diet texture to dysphagia 3 dental soft. Continue ST follow up. Subsequent sessions to focus on maximizing PO intake safety and determining potential for diet texture advancement.    "

## 2024-01-24 NOTE — PROGRESS NOTES
"[facility]  Progress Note  Name: Jeanne Lyon I  MRN: [id number]  Unit/Bed#: [facility] 926-01 I Date of Admission: 1/22/2024   Date of Service: 1/24/2024 I Hospital Day: 2    Assessment/Plan   * Acute sialoadenitis  Assessment & Plan  Presented with right facial swelling, reported dry mouth a few days prior  CT revealed \"Right-sided sialoadenitis involving both the right parotid and submandibular glands with adjacent associated facial cellulitis. No discrete collection. No definite sialolith detected. Infectious or inflammatory processes should be excluded especially since 2 discrete salivary glands are involved.  ENT following, appreciate their ongoing recommendations - frequent massage over salivary glands, warm compress  On IV Unasyn  Pain controlled  On room air, no respiratory compromise  Patient reports tolerating diet, she is requesting to advance - will defer to ENT    Sepsis (HCC)  Assessment & Plan  POA aeb tachycardia and leukocytosis   Blood cultures were not drawn on admission  Source as above  Continue Unasyn  Trend fever curve, WBCs    Anxiety  Assessment & Plan  On Ativan 1 mg 3 times daily as needed    GERD (gastroesophageal reflux disease)  Assessment & Plan  Continue Pepcid 20 mg daily.    Sleep apnea treated with nocturnal BiPAP  Assessment & Plan  Currently on BiPAP at night.    Hypokalemia  Assessment & Plan  Replete and monitor   Check mag             VTE Pharmacologic Prophylaxis:    lovenox    Mobility:   Basic Mobility Inpatient Raw Score: 24  JH-HLM Goal: 8: Walk 250 feet or more  JH-HLM Achieved: 6: Walk 10 steps or more  HLM Goal NOT achieved. Continue with multidisciplinary rounding and encourage appropriate mobility to improve upon HLM goals.    Patient Centered Rounds: I performed bedside rounds with nursing staff today.  D/w RN  Discussions with Specialists or Other Care Team Provider: [facility] E[facility]    Education and Discussions with Family / " "Patient: Patient declined call to .     Total Time Spent on Date of Encounter in care of patient: 35 mins. This time was spent on one or more of the following: performing physical exam; counseling and coordination of care; obtaining or reviewing history; documenting in the medical record; reviewing/ordering tests, medications or procedures; communicating with other healthcare professionals and discussing with patient's family/caregivers.    Current Length of Stay: 2 day(s)  Current Patient Status: Inpatient   Certification Statement: The patient will continue to require additional inpatient hospital stay due to sepsis, IV abx  Discharge Plan: Anticipate discharge in 48 hrs to home.    Code Status: Level 1 - Full Code    Subjective:   Ms. Lyon reports feeling better today. Currently without pain. She reports being able to swallow applesauce without difficulty and states that she would like her diet advanced. Denies difficulty swallowing, speaking, or breathing. She reports having a \"panic attack\" yesterday but that resolved    Objective:     Vitals:   Temp (24hrs), Av.5 °F (36.9 °C), Min:98.1 °F (36.7 °C), Max:99 °F (37.2 °C)    Temp:  [98.1 °F (36.7 °C)-99 °F (37.2 °C)] 98.1 °F (36.7 °C)  HR:  [93-97] 97  Resp:  [14-17] 17  BP: (143-153)/(92-98) 153/98  SpO2:  [95 %-97 %] 95 %  There is no height or weight on file to calculate BMI.     Input and Output Summary (last 24 hours):     Intake/Output Summary (Last 24 hours) at 2024 1024  Last data filed at 2024 0949  Gross per 24 hour   Intake --   Output 1375 ml   Net -1375 ml       Physical Exam:   Physical Exam  Vitals reviewed.   Constitutional:       Appearance: She is obese.      Comments: Patient seen sitting in bed, NAD   HENT:      Head:      Comments: Right facial swelling  Cardiovascular:      Rate and Rhythm: Normal rate and regular rhythm.   Pulmonary:      Effort: Pulmonary effort is normal. No respiratory distress.      Breath " sounds: Normal breath sounds.   Abdominal:      General: Bowel sounds are normal.      Palpations: Abdomen is soft.      Tenderness: There is no abdominal tenderness.   Musculoskeletal:      Right lower leg: No edema.      Left lower leg: No edema.   Skin:     General: Skin is warm.   Neurological:      Mental Status: She is alert and oriented to person, place, and time.   Psychiatric:         Mood and Affect: Mood normal.         Behavior: Behavior normal.          Additional Data:     Labs:  Results from last 7 days   Lab Units 01/24/24  0529 01/23/24  0422 01/22/24  1857   WBC Thousand/uL 27.61* 26.23* 20.20*   HEMOGLOBIN g/dL 14.5 14.8 15.0   HEMATOCRIT % 45.3 46.6* 46.9*   PLATELETS Thousands/uL 363 315 363   NEUTROS PCT %  --   --  82*   LYMPHS PCT %  --   --  12*   LYMPHO PCT %  --  8*  --    MONOS PCT %  --   --  5   MONO PCT %  --  4  --    EOS PCT %  --  0 0     Results from last 7 days   Lab Units 01/24/24  0529 01/23/24  0638   SODIUM mmol/L 139 134*   POTASSIUM mmol/L 3.2* 5.4*   CHLORIDE mmol/L 100 101   CO2 mmol/L 25 23   BUN mg/dL 6 8   CREATININE mg/dL 0.74 0.78   ANION GAP mmol/L 14 10   CALCIUM mg/dL 9.3 8.7   ALBUMIN g/dL  --  4.1   TOTAL BILIRUBIN mg/dL  --  0.85   ALK PHOS U/L  --  135*   ALT U/L  --  8   AST U/L  --  21   GLUCOSE RANDOM mg/dL 108 161*                       Lines/Drains:  Invasive Devices       Peripheral Intravenous Line  Duration             Peripheral IV 01/23/24 Right Antecubital <1 day    Peripheral IV 01/24/24 Dorsal (posterior);Right Hand <1 day                          Imaging: Reviewed radiology reports from this admission including: CT soft tissue neck    Recent Cultures (last 7 days):         Last 24 Hours Medication List:   Current Facility-Administered Medications   Medication Dose Route Frequency Provider Last Rate    acetaminophen  975 mg Oral Q8H [facility] Jack Menchaca MD      ampicillin-sulbactam  3 g Intravenous Q6H Jack Menchaca MD 3 g (01/24/24  0524)    ARIPiprazole  10 mg Oral Daily Jack Menchaca MD      buPROPion  150 mg Oral Daily Jack Menchaca MD      docusate sodium  100 mg Oral BID Jack Menchaca MD      DULoxetine  60 mg Oral Daily Jack Menchaca MD      enoxaparin  40 mg Subcutaneous Daily Jack Menchaca MD      famotidine  20 mg Oral Daily Jack Menchaca MD      hydrALAZINE  10 mg Intravenous Q4H PRN Jack Menchaca MD      lamoTRIgine  200 mg Oral Daily Jack Menchaca MD      LORazepam  1 mg Oral TID PRN Jack Menchaca MD      morphine injection  2 mg Intravenous Q3H PRN Jordyn S Chente, CRNP      morphine injection  4 mg Intravenous Q3H PRN Jordyn S Chente, CRNP      ondansetron  4 mg Intravenous Q6H PRN Jordyn S Chente, CRNP      oxybutynin  15 mg Oral Daily Jack Menchaca MD      potassium chloride  40 mEq Oral Once Az Muniz PA-C      sodium chloride  75 mL/hr Intravenous Continuous Jordyn S Chente, CRNP 75 mL/hr (01/24/24 3842)        Today, Patient Was Seen By: Az Muniz PA-C    **Please Note: This note may have been constructed using a voice recognition system.**

## 2024-01-24 NOTE — ASSESSMENT & PLAN NOTE
POA aeb tachycardia and leukocytosis   Blood cultures were not drawn on admission  Source as above  Continue Unasyn  Trend fever curve, WBCs

## 2024-01-24 NOTE — ASSESSMENT & PLAN NOTE
"Presented with right facial swelling, reported dry mouth a few days prior  CT revealed \"Right-sided sialoadenitis involving both the right parotid and submandibular glands with adjacent associated facial cellulitis. No discrete collection. No definite sialolith detected. Infectious or inflammatory processes should be excluded especially since 2 discrete salivary glands are involved.  ENT following, appreciate their ongoing recommendations - frequent massage over salivary glands, warm compress  On IV Unasyn  Pain controlled  On room air, no respiratory compromise  Patient reports tolerating diet, she is requesting to advance - will defer to ENT  "

## 2024-01-24 NOTE — PLAN OF CARE
Problem: PAIN - ADULT  Goal: Verbalizes/displays adequate comfort level or baseline comfort level  Description: Interventions:  - Encourage patient to monitor pain and request assistance  - Assess pain using appropriate pain scale  - Administer analgesics based on type and severity of pain and evaluate response  - Implement non-pharmacological measures as appropriate and evaluate response  - Consider cultural and social influences on pain and pain management  - Notify physician/advanced practitioner if interventions unsuccessful or patient reports new pain  Outcome: Progressing     Problem: INFECTION - ADULT  Goal: Absence or prevention of progression during hospitalization  Description: INTERVENTIONS:  - Assess and monitor for signs and symptoms of infection  - Monitor lab/diagnostic results  - Monitor all insertion sites, i.e. indwelling lines, tubes, and drains  - Monitor endotracheal if appropriate and nasal secretions for changes in amount and color  - [place] appropriate cooling/warming therapies per order  - Administer medications as ordered  - Instruct and encourage patient and family to use good hand hygiene technique  - Identify and instruct in appropriate isolation precautions for identified infection/condition  Outcome: Progressing  Goal: Absence of fever/infection during neutropenic period  Description: INTERVENTIONS:  - Monitor WBC    Outcome: Progressing     Problem: SAFETY ADULT  Goal: Patient will remain free of falls  Description: INTERVENTIONS:  - Educate patient/family on patient safety including physical limitations  - Instruct patient to call for assistance with activity   - Consult OT/PT to assist with strengthening/mobility   - Keep Call bell within reach  - Keep bed low and locked with side rails adjusted as appropriate  - Keep care items and personal belongings within reach  - Initiate and maintain comfort rounds  - Make Fall Risk Sign visible to staff  - Offer Toileting every 2 Hours,  in advance of need  - Initiate/Maintain bed alarm  - Obtain necessary fall risk management equipment:   - Apply yellow socks and bracelet for high fall risk patients  - Consider moving patient to room near nurses station  Outcome: Progressing  Goal: Maintain or return to baseline ADL function  Description: INTERVENTIONS:  -  Assess patient's ability to carry out ADLs; assess patient's baseline for ADL function and identify physical deficits which impact ability to perform ADLs (bathing, care of mouth/teeth, toileting, grooming, dressing, etc.)  - Assess/evaluate cause of self-care deficits   - Assess range of motion  - Assess patient's mobility; develop plan if impaired  - Assess patient's need for assistive devices and provide as appropriate  - Encourage maximum independence but intervene and supervise when necessary  - Involve family in performance of ADLs  - Assess for home care needs following discharge   - Consider OT consult to assist with ADL evaluation and planning for discharge  - Provide patient education as appropriate  Outcome: Progressing  Goal: Maintains/Returns to pre admission functional level  Description: INTERVENTIONS:  - Perform AM-PAC 6 Click Basic Mobility/ Daily Activity assessment daily.  - Set and communicate daily mobility goal to care team and patient/family/caregiver.   - Collaborate with rehabilitation services on mobility goals if consulted  - Perform Range of Motion 2 times a day.  - Reposition patient every 2 hours.  - Dangle patient 2 times a day  - Stand patient 2 times a day  - Ambulate patient 2 times a day  - Out of bed to chair 2 times a day   - Out of bed for meals 2 times a day  - Out of bed for toileting  - Record patient progress and toleration of activity level   Outcome: Progressing     Problem: DISCHARGE PLANNING  Goal: Discharge to home or other facility with appropriate resources  Description: INTERVENTIONS:  - Identify barriers to discharge w/patient and caregiver  -  Arrange for needed discharge resources and transportation as appropriate  - Identify discharge learning needs (meds, wound care, etc.)  - Arrange for interpretive services to assist at discharge as needed  - Refer to Case Management Department for coordinating discharge planning if the patient needs post-hospital services based on physician/advanced practitioner order or complex needs related to functional status, cognitive ability, or social support system  Outcome: Progressing     Problem: Knowledge Deficit  Goal: Patient/family/caregiver demonstrates understanding of disease process, treatment plan, medications, and discharge instructions  Description: Complete learning assessment and assess knowledge base.  Interventions:  - Provide teaching at level of understanding  - Provide teaching via preferred learning methods  Outcome: Progressing     Problem: Nutrition/Hydration-ADULT  Goal: Nutrient/Hydration intake appropriate for improving, restoring or maintaining nutritional needs  Description: Monitor and assess patient's nutrition/hydration status for malnutrition. Collaborate with interdisciplinary team and initiate plan and interventions as ordered.  Monitor patient's weight and dietary intake as ordered or per policy. Utilize nutrition screening tool and intervene as necessary. Determine patient's food preferences and provide high-protein, high-caloric foods as appropriate.     INTERVENTIONS:  - Monitor oral intake, urinary output, labs, and treatment plans  - Assess nutrition and hydration status and recommend course of action  - Evaluate amount of meals eaten  - Assist patient with eating if necessary   - Allow adequate time for meals  - Recommend/ encourage appropriate diets, oral nutritional supplements, and vitamin/mineral supplements  - Order, calculate, and assess calorie counts as needed  - Recommend, monitor, and adjust tube feedings and TPN/PPN based on assessed needs  - Assess need for intravenous  fluids  - Provide specific nutrition/hydration education as appropriate  - Include patient/family/caregiver in decisions related to nutrition  Outcome: Progressing     Problem: Nutrition/Hydration-ADULT  Goal: Nutrient/Hydration intake appropriate for improving, restoring or maintaining nutritional needs  Description: Monitor and assess patient's nutrition/hydration status for malnutrition. Collaborate with interdisciplinary team and initiate plan and interventions as ordered.  Monitor patient's weight and dietary intake as ordered or per policy. Utilize nutrition screening tool and intervene as necessary. Determine patient's food preferences and provide high-protein, high-caloric foods as appropriate.     INTERVENTIONS:  - Monitor oral intake, urinary output, labs, and treatment plans  - Assess nutrition and hydration status and recommend course of action  - Evaluate amount of meals eaten  - Assist patient with eating if necessary   - Allow adequate time for meals  - Recommend/ encourage appropriate diets, oral nutritional supplements, and vitamin/mineral supplements  - Order, calculate, and assess calorie counts as needed  - Recommend, monitor, and adjust tube feedings and TPN/PPN based on assessed needs  - Assess need for intravenous fluids  - Provide specific nutrition/hydration education as appropriate  - Include patient/family/caregiver in decisions related to nutrition  Outcome: Progressing

## 2024-01-24 NOTE — CASE MANAGEMENT
Case Management Discharge Planning Note    Patient name Jeanne Lyon  Location [facility] 926/[facility] 926-01 MRN [id number]  : 1950 Date 2024       Current Admission Date: 2024  Current Admission Diagnosis:Acute sialoadenitis   Patient Active Problem List    Diagnosis Date Noted    Hypokalemia 2024    Sepsis (HCC) 2024    Acute sialoadenitis 2024    ANA (obstructive sleep apnea) 2023    GERD (gastroesophageal reflux disease)     Primary osteoarthritis of left knee 2022    Anxiety     Depression     Bloody discharge from left nipple 2021    Breast lump or mass     Intraductal papilloma of breast, right 2021    Obesity, Class I, BMI 30-34.9 2019    Screening for diabetes mellitus 2019    Screening for thyroid disorder 2019    Abnormal weight gain 2019    Sleep apnea treated with nocturnal BiPAP     Complex sleep apnea syndrome       LOS (days): 2  Geometric Mean LOS (GMLOS) (days): 3.6  Days to GMLOS:2     OBJECTIVE:  Risk of Unplanned Readmission Score: 11.61         Current admission status: Inpatient   [facility]:   [id number] [address] [address]  Phone: [phone] Fax: [phone]    Primary Care Provider: Chris Monique DO    Primary Insurance: MEDICARE  Secondary Insurance: [employer]    DISCHARGE DETAILS:          Additional Comments: Per chart review, pt to remain inpatient at this time due to sepsis and IV abx. No CM needs anticipated at this time. CM to follow.

## 2024-01-24 NOTE — PROGRESS NOTES
"OTOLARYNGOLOGY PROGRESS NOTE    Date of Service: 1/24/2024 5:18 AM    HPI  Patient is a 73 y.o. female  w/ no sig PMH besides MDD, chronic back pain who presented to ED on 1/23 w/ 1d hx of R facial pain. Pt has never had siolithiasis or sioloadenitis prior. No sig dental concerns. Pt began having some SoB and pill dysphagia in ED, new to her. Pt does endorse chronic xerostomia at home.     Overnight Events: NAEO. Pain is improving, doing massage and warm compresses. Had a panic attack yesterday but she is doing much better now.     PHYSICAL EXAM:  Vitals:    01/23/24 2158   BP: 148/93   Pulse: 97   Resp: 14   Temp: 99 °F (37.2 °C)   SpO2: 95%        General: No acute distress, AOx4  HEENT: Oral cavity: mild trismus due to discomfort, dry mucosa, purulent dc expressed from R Ameena's duct w/ manual massage over parotid. R FoM TTP  Neck: Trachea is midline, no thyroid nodules, R neck TTP  Neurology: No focal deficits  Lungs: Breathing easy, unlabored and even on room air  Cardio: RRR, well perfused  Abd: soft, NT, ND        Intake/Output Summary (Last 24 hours) at 1/24/2024 0518  Last data filed at 1/24/2024 0340  Gross per 24 hour   Intake --   Output 775 ml   Net -775 ml         LABORATORY    Recent Labs     01/22/24  1857 01/23/24  0422   WBC 20.20* 26.23*   HGB 15.0 14.8   HCT 46.9* 46.6*    315       Recent Labs     01/22/24  1857 01/23/24  0638   K 3.8 5.4*    101   BUN 12 8   PHOS  --  3.0   MG  --  2.1       Invalid input(s): \"PTPAT\", \"PTINR\", \"APTTMNNM\", \"APTTPAT\"      Patient Active Problem List   Diagnosis    Sleep apnea treated with nocturnal BiPAP    Complex sleep apnea syndrome    Obesity, Class I, BMI 30-34.9    Screening for diabetes mellitus    Screening for thyroid disorder    Abnormal weight gain    Intraductal papilloma of breast, right    Breast lump or mass    Bloody discharge from left nipple    Anxiety    Depression    Primary osteoarthritis of left knee    GERD (gastroesophageal " reflux disease)    ANA (obstructive sleep apnea)    Acute sialoadenitis    Sepsis (HCC)         ASSESSMENT  Patient is a 73 y.o. female w/ acute and chronic problems as above, who presents with right sided facial pain, erythema and swelling with xerostomia concerning for R sialoadenitiis, improving on antibiotics.     PLAN    -continue abx  -encourage hydration  -frequent massage over salivary glands, warm compress  -recommend admission due to multi-site involvement for obs  -rest of care per primary

## 2024-01-25 ENCOUNTER — TELEPHONE (OUTPATIENT)
Dept: OTHER | Facility: OTHER | Age: 74
End: 2024-01-25

## 2024-01-25 LAB
ANION GAP SERPL CALCULATED.3IONS-SCNC: 10 MMOL/L
BUN SERPL-MCNC: 4 MG/DL (ref 5–25)
CALCIUM SERPL-MCNC: 8.2 MG/DL (ref 8.4–10.2)
CHLORIDE SERPL-SCNC: 106 MMOL/L (ref 96–108)
CO2 SERPL-SCNC: 26 MMOL/L (ref 21–32)
CREAT SERPL-MCNC: 0.67 MG/DL (ref 0.6–1.3)
ERYTHROCYTE [DISTWIDTH] IN BLOOD BY AUTOMATED COUNT: 13.9 % (ref 11.6–15.1)
GFR SERPL CREATININE-BSD FRML MDRD: 88 ML/MIN/1.73SQ M
GLUCOSE SERPL-MCNC: 132 MG/DL (ref 65–140)
HCT VFR BLD AUTO: 41.7 % (ref 34.8–46.1)
HGB BLD-MCNC: 13 G/DL (ref 11.5–15.4)
MAGNESIUM SERPL-MCNC: 2 MG/DL (ref 1.9–2.7)
MCH RBC QN AUTO: 27.7 PG (ref 26.8–34.3)
MCHC RBC AUTO-ENTMCNC: 31.2 G/DL (ref 31.4–37.4)
MCV RBC AUTO: 89 FL (ref 82–98)
PLATELET # BLD AUTO: 304 THOUSANDS/UL (ref 149–390)
PMV BLD AUTO: 9.4 FL (ref 8.9–12.7)
POTASSIUM SERPL-SCNC: 3 MMOL/L (ref 3.5–5.3)
RBC # BLD AUTO: 4.7 MILLION/UL (ref 3.81–5.12)
SODIUM SERPL-SCNC: 142 MMOL/L (ref 135–147)
WBC # BLD AUTO: 13.72 THOUSAND/UL (ref 4.31–10.16)

## 2024-01-25 PROCEDURE — 85027 COMPLETE CBC AUTOMATED: CPT | Performed by: PHYSICIAN ASSISTANT

## 2024-01-25 PROCEDURE — 80048 BASIC METABOLIC PNL TOTAL CA: CPT | Performed by: PHYSICIAN ASSISTANT

## 2024-01-25 PROCEDURE — 99232 SBSQ HOSP IP/OBS MODERATE 35: CPT | Performed by: FAMILY MEDICINE

## 2024-01-25 PROCEDURE — 83735 ASSAY OF MAGNESIUM: CPT | Performed by: PHYSICIAN ASSISTANT

## 2024-01-25 PROCEDURE — 92526 ORAL FUNCTION THERAPY: CPT

## 2024-01-25 RX ORDER — POTASSIUM CHLORIDE 20 MEQ/1
40 TABLET, EXTENDED RELEASE ORAL ONCE
Status: COMPLETED | OUTPATIENT
Start: 2024-01-25 | End: 2024-01-25

## 2024-01-25 RX ADMIN — BUPROPION HYDROCHLORIDE 150 MG: 150 TABLET, FILM COATED, EXTENDED RELEASE ORAL at 08:47

## 2024-01-25 RX ADMIN — DOCUSATE SODIUM 100 MG: 100 CAPSULE, LIQUID FILLED ORAL at 08:47

## 2024-01-25 RX ADMIN — ARIPIPRAZOLE 10 MG: 10 TABLET ORAL at 08:48

## 2024-01-25 RX ADMIN — POTASSIUM CHLORIDE 40 MEQ: 1500 TABLET, EXTENDED RELEASE ORAL at 21:30

## 2024-01-25 RX ADMIN — ACETAMINOPHEN 975 MG: 325 TABLET, FILM COATED ORAL at 14:21

## 2024-01-25 RX ADMIN — DULOXETINE HYDROCHLORIDE 60 MG: 60 CAPSULE, DELAYED RELEASE ORAL at 08:47

## 2024-01-25 RX ADMIN — DOCUSATE SODIUM 100 MG: 100 CAPSULE, LIQUID FILLED ORAL at 17:46

## 2024-01-25 RX ADMIN — ACETAMINOPHEN 975 MG: 325 TABLET, FILM COATED ORAL at 05:07

## 2024-01-25 RX ADMIN — OXYBUTYNIN 15 MG: 10 TABLET, FILM COATED, EXTENDED RELEASE ORAL at 08:47

## 2024-01-25 RX ADMIN — ENOXAPARIN SODIUM 40 MG: 40 INJECTION SUBCUTANEOUS at 08:47

## 2024-01-25 RX ADMIN — ACETAMINOPHEN 975 MG: 325 TABLET, FILM COATED ORAL at 21:12

## 2024-01-25 RX ADMIN — LAMOTRIGINE 200 MG: 100 TABLET ORAL at 08:47

## 2024-01-25 RX ADMIN — SODIUM CHLORIDE 3 G: 9 INJECTION, SOLUTION INTRAVENOUS at 12:01

## 2024-01-25 RX ADMIN — FAMOTIDINE 20 MG: 20 TABLET, FILM COATED ORAL at 08:47

## 2024-01-25 RX ADMIN — SODIUM CHLORIDE 75 ML/HR: 0.9 INJECTION, SOLUTION INTRAVENOUS at 15:32

## 2024-01-25 RX ADMIN — SODIUM CHLORIDE 3 G: 9 INJECTION, SOLUTION INTRAVENOUS at 05:08

## 2024-01-25 RX ADMIN — SODIUM CHLORIDE 3 G: 9 INJECTION, SOLUTION INTRAVENOUS at 17:46

## 2024-01-25 NOTE — SPEECH THERAPY NOTE
"Speech-Language Pathology Progress Note    Patient Name: Jeanne Lyon    Today's Date: 1/25/2024    Subjective:  Pt was awake and alert. She was sitting upright in bed. Patient stated \"I'm just not hungry.\"    Objective:  Pt was seen today for dysphagia therapy. Current diet is dysphagia 3 dental soft with thin liquids. Pt was on room air. Focus of today's session was determine potential for diet texture advancement and to assist with salivary gland massage/compress . Textures offered today included puree and thin liquid via straw, pt declined solid food stating she is able to swallow but has no appetite. No issues with thin/puree today. SLP assisted with warm compress to salivary glands and massage. Encouraged to continue compress/massage and regular oral care.     Assessment:  Swallow function is stable with current diet. Diet texture and liquid consistency remains appropriate at this time.  Pt agreeable to continuing dental soft diet for now. Will f/u for potential to advance as able.     Plan:  Continue dysphagia 3 dental soft and thin liquids. Continue ST follow up. Subsequent sessions to focus on maximizing PO intake safety and determining potential for diet texture advancement.    "

## 2024-01-25 NOTE — PLAN OF CARE
Problem: Knowledge Deficit  Goal: Patient/family/caregiver demonstrates understanding of disease process, treatment plan, medications, and discharge instructions  Description: Complete learning assessment and assess knowledge base.  Interventions:  - Provide teaching at level of understanding  - Provide teaching via preferred learning methods  Outcome: Progressing     Problem: Nutrition/Hydration-ADULT  Goal: Nutrient/Hydration intake appropriate for improving, restoring or maintaining nutritional needs  Description: Monitor and assess patient's nutrition/hydration status for malnutrition. Collaborate with interdisciplinary team and initiate plan and interventions as ordered.  Monitor patient's weight and dietary intake as ordered or per policy. Utilize nutrition screening tool and intervene as necessary. Determine patient's food preferences and provide high-protein, high-caloric foods as appropriate.     INTERVENTIONS:  - Monitor oral intake, urinary output, labs, and treatment plans  - Assess nutrition and hydration status and recommend course of action  - Evaluate amount of meals eaten  - Assist patient with eating if necessary   - Allow adequate time for meals  - Recommend/ encourage appropriate diets, oral nutritional supplements, and vitamin/mineral supplements  - Order, calculate, and assess calorie counts as needed  - Recommend, monitor, and adjust tube feedings and TPN/PPN based on assessed needs  - Assess need for intravenous fluids  - Provide specific nutrition/hydration education as appropriate  - Include patient/family/caregiver in decisions related to nutrition  Outcome: Progressing   Problem: INFECTION - ADULT  Goal: Absence or prevention of progression during hospitalization  Description: INTERVENTIONS:  - Assess and monitor for signs and symptoms of infection  - Monitor lab/diagnostic results  - Monitor all insertion sites, i.e. indwelling lines, tubes, and drains  - Monitor endotracheal if  appropriate and nasal secretions for changes in amount and color  - [employer] appropriate cooling/warming therapies per order  - Administer medications as ordered  - Instruct and encourage patient and family to use good hand hygiene technique  - Identify and instruct in appropriate isolation precautions for identified infection/condition  Outcome: Progressing  Goal: Absence of fever/infection during neutropenic period  Description: INTERVENTIONS:  - Monitor WBC    Outcome: Progressing   Problem: PAIN - ADULT  Goal: Verbalizes/displays adequate comfort level or baseline comfort level  Description: Interventions:  - Encourage patient to monitor pain and request assistance  - Assess pain using appropriate pain scale  - Administer analgesics based on type and severity of pain and evaluate response  - Implement non-pharmacological measures as appropriate and evaluate response  - Consider cultural and social influences on pain and pain management  - Notify physician/advanced practitioner if interventions unsuccessful or patient reports new pain  Outcome: Progressing

## 2024-01-25 NOTE — PROGRESS NOTES
"OTOLARYNGOLOGY PROGRESS NOTE    Date of Service: 1/25/2024 5:52 AM    HPI  Patient is a 73 y.o. female  w/ no sig PMH besides MDD, chronic back pain who presented to ED on 1/23 w/ 1d hx of R facial pain. Pt has never had siolithiasis or sioloadenitis prior. No sig dental concerns. Pt began having some SoB and pill dysphagia in ED, new to her. Pt does endorse chronic xerostomia at home.     Overnight Events: NAEO. Pain continues to improve. Erythema and swelling is improving. Continues to do massage and warm compresses.     PHYSICAL EXAM:  Vitals:    01/24/24 2246   BP: 160/93   Pulse: 91   Resp: 18   Temp: 97.8 °F (36.6 °C)   SpO2: 96%        General: No acute distress, AOx4  HEENT: Oral cavity: mild trismus due to discomfort, purulent dc expressed from R Ameena's duct w/ manual massage over parotid. R FoM mild TTP  Neck: Trachea is midline, no thyroid nodules, R neck mild TTP  Neurology: No focal deficits  Lungs: Breathing easy, unlabored and even on room air  Cardio: RRR, well perfused  Abd: soft, NT, ND        Intake/Output Summary (Last 24 hours) at 1/25/2024 0552  Last data filed at 1/25/2024 0508  Gross per 24 hour   Intake 2282.5 ml   Output 1675 ml   Net 607.5 ml         LABORATORY    Recent Labs     01/22/24  1857 01/23/24  0422 01/24/24  0529   WBC 20.20* 26.23* 27.61*   HGB 15.0 14.8 14.5   HCT 46.9* 46.6* 45.3    315 363       Recent Labs     01/22/24  1857 01/23/24  0638 01/24/24  0529   K 3.8 5.4* 3.2*    101 100   BUN 12 8 6   PHOS  --  3.0  --    MG  --  2.1  --        Invalid input(s): \"PTPAT\", \"PTINR\", \"APTTMNNM\", \"APTTPAT\"      Patient Active Problem List   Diagnosis    Sleep apnea treated with nocturnal BiPAP    Complex sleep apnea syndrome    Obesity, Class I, BMI 30-34.9    Screening for diabetes mellitus    Screening for thyroid disorder    Abnormal weight gain    Intraductal papilloma of breast, right    Breast lump or mass    Bloody discharge from left nipple    Anxiety    " Depression    Primary osteoarthritis of left knee    GERD (gastroesophageal reflux disease)    ANA (obstructive sleep apnea)    Acute sialoadenitis    Sepsis (HCC)    Hypokalemia         ASSESSMENT  Patient is a 73 y.o. female w/ acute and chronic problems as above, who presents with right sided facial pain, erythema and swelling with xerostomia concerning for R sialoadenitiis, improving on antibiotics.     PLAN    -continue antibiotics for a course of 10 days  -encourage hydration  -frequent massage over salivary glands, warm compress  - ENT to sign off at this time  -rest of care per primary    Please [facility] resident role for any further questions or concerns.     Carmina Rod MD   Otolaryngology- Head and Neck Surgery

## 2024-01-26 PROBLEM — R03.0 ELEVATED BLOOD PRESSURE READING: Status: ACTIVE | Noted: 2024-01-26

## 2024-01-26 LAB
ANION GAP SERPL CALCULATED.3IONS-SCNC: 8 MMOL/L
BUN SERPL-MCNC: 5 MG/DL (ref 5–25)
CALCIUM SERPL-MCNC: 8.2 MG/DL (ref 8.4–10.2)
CHLORIDE SERPL-SCNC: 109 MMOL/L (ref 96–108)
CO2 SERPL-SCNC: 28 MMOL/L (ref 21–32)
CREAT SERPL-MCNC: 0.73 MG/DL (ref 0.6–1.3)
ERYTHROCYTE [DISTWIDTH] IN BLOOD BY AUTOMATED COUNT: 14 % (ref 11.6–15.1)
GFR SERPL CREATININE-BSD FRML MDRD: 82 ML/MIN/1.73SQ M
GLUCOSE SERPL-MCNC: 83 MG/DL (ref 65–140)
HCT VFR BLD AUTO: 36.8 % (ref 34.8–46.1)
HCT VFR BLD AUTO: 41.2 % (ref 34.8–46.1)
HGB BLD-MCNC: 11.4 G/DL (ref 11.5–15.4)
HGB BLD-MCNC: 12.7 G/DL (ref 11.5–15.4)
MCH RBC QN AUTO: 27.7 PG (ref 26.8–34.3)
MCHC RBC AUTO-ENTMCNC: 31 G/DL (ref 31.4–37.4)
MCV RBC AUTO: 89 FL (ref 82–98)
PLATELET # BLD AUTO: 316 THOUSANDS/UL (ref 149–390)
PMV BLD AUTO: 9.5 FL (ref 8.9–12.7)
POTASSIUM SERPL-SCNC: 3.3 MMOL/L (ref 3.5–5.3)
RBC # BLD AUTO: 4.12 MILLION/UL (ref 3.81–5.12)
SODIUM SERPL-SCNC: 145 MMOL/L (ref 135–147)
WBC # BLD AUTO: 8.52 THOUSAND/UL (ref 4.31–10.16)

## 2024-01-26 PROCEDURE — 85018 HEMOGLOBIN: CPT | Performed by: FAMILY MEDICINE

## 2024-01-26 PROCEDURE — 99232 SBSQ HOSP IP/OBS MODERATE 35: CPT | Performed by: FAMILY MEDICINE

## 2024-01-26 PROCEDURE — 80048 BASIC METABOLIC PNL TOTAL CA: CPT | Performed by: FAMILY MEDICINE

## 2024-01-26 PROCEDURE — 85027 COMPLETE CBC AUTOMATED: CPT | Performed by: FAMILY MEDICINE

## 2024-01-26 PROCEDURE — 85014 HEMATOCRIT: CPT | Performed by: FAMILY MEDICINE

## 2024-01-26 RX ORDER — POTASSIUM CHLORIDE 20 MEQ/1
40 TABLET, EXTENDED RELEASE ORAL ONCE
Status: COMPLETED | OUTPATIENT
Start: 2024-01-26 | End: 2024-01-26

## 2024-01-26 RX ORDER — AMLODIPINE BESYLATE 2.5 MG/1
2.5 TABLET ORAL DAILY
Status: DISCONTINUED | OUTPATIENT
Start: 2024-01-26 | End: 2024-01-27 | Stop reason: HOSPADM

## 2024-01-26 RX ORDER — DOCUSATE SODIUM 100 MG/1
100 CAPSULE, LIQUID FILLED ORAL 2 TIMES DAILY
Status: CANCELLED
Start: 2024-01-26

## 2024-01-26 RX ORDER — HYDRALAZINE HYDROCHLORIDE 20 MG/ML
10 INJECTION INTRAMUSCULAR; INTRAVENOUS EVERY 4 HOURS PRN
Status: DISCONTINUED | OUTPATIENT
Start: 2024-01-26 | End: 2024-01-27 | Stop reason: HOSPADM

## 2024-01-26 RX ORDER — LABETALOL HYDROCHLORIDE 5 MG/ML
10 INJECTION, SOLUTION INTRAVENOUS EVERY 4 HOURS PRN
Status: DISCONTINUED | OUTPATIENT
Start: 2024-01-26 | End: 2024-01-27 | Stop reason: HOSPADM

## 2024-01-26 RX ORDER — AMOXICILLIN AND CLAVULANATE POTASSIUM 875; 125 MG/1; MG/1
1 TABLET, FILM COATED ORAL EVERY 12 HOURS SCHEDULED
Status: DISCONTINUED | OUTPATIENT
Start: 2024-01-26 | End: 2024-01-27 | Stop reason: HOSPADM

## 2024-01-26 RX ADMIN — SODIUM CHLORIDE 75 ML/HR: 0.9 INJECTION, SOLUTION INTRAVENOUS at 04:21

## 2024-01-26 RX ADMIN — AMLODIPINE BESYLATE 2.5 MG: 2.5 TABLET ORAL at 17:29

## 2024-01-26 RX ADMIN — ACETAMINOPHEN 975 MG: 325 TABLET, FILM COATED ORAL at 21:09

## 2024-01-26 RX ADMIN — POTASSIUM CHLORIDE 40 MEQ: 1500 TABLET, EXTENDED RELEASE ORAL at 12:36

## 2024-01-26 RX ADMIN — OXYBUTYNIN 15 MG: 10 TABLET, FILM COATED, EXTENDED RELEASE ORAL at 08:33

## 2024-01-26 RX ADMIN — DOCUSATE SODIUM 100 MG: 100 CAPSULE, LIQUID FILLED ORAL at 17:29

## 2024-01-26 RX ADMIN — ACETAMINOPHEN 975 MG: 325 TABLET, FILM COATED ORAL at 06:02

## 2024-01-26 RX ADMIN — BUPROPION HYDROCHLORIDE 150 MG: 150 TABLET, FILM COATED, EXTENDED RELEASE ORAL at 08:33

## 2024-01-26 RX ADMIN — LAMOTRIGINE 200 MG: 100 TABLET ORAL at 08:33

## 2024-01-26 RX ADMIN — ENOXAPARIN SODIUM 40 MG: 40 INJECTION SUBCUTANEOUS at 08:33

## 2024-01-26 RX ADMIN — AMOXICILLIN AND CLAVULANATE POTASSIUM 1 TABLET: 875; 125 TABLET, FILM COATED ORAL at 17:29

## 2024-01-26 RX ADMIN — DOCUSATE SODIUM 100 MG: 100 CAPSULE, LIQUID FILLED ORAL at 08:33

## 2024-01-26 RX ADMIN — HYDRALAZINE HYDROCHLORIDE 10 MG: 20 INJECTION INTRAMUSCULAR; INTRAVENOUS at 21:19

## 2024-01-26 RX ADMIN — SODIUM CHLORIDE 3 G: 9 INJECTION, SOLUTION INTRAVENOUS at 00:01

## 2024-01-26 RX ADMIN — FAMOTIDINE 20 MG: 20 TABLET, FILM COATED ORAL at 08:33

## 2024-01-26 RX ADMIN — SODIUM CHLORIDE 3 G: 9 INJECTION, SOLUTION INTRAVENOUS at 06:02

## 2024-01-26 RX ADMIN — DULOXETINE HYDROCHLORIDE 60 MG: 60 CAPSULE, DELAYED RELEASE ORAL at 08:33

## 2024-01-26 RX ADMIN — HYDRALAZINE HYDROCHLORIDE 10 MG: 20 INJECTION, SOLUTION INTRAMUSCULAR; INTRAVENOUS at 08:32

## 2024-01-26 RX ADMIN — ARIPIPRAZOLE 10 MG: 10 TABLET ORAL at 08:34

## 2024-01-26 NOTE — TELEPHONE ENCOUNTER
PT. Called in to notify she is canceling her appointment, 1/26 @ 8:00 due to being admitted in hospital.

## 2024-01-26 NOTE — PLAN OF CARE
Problem: PAIN - ADULT  Goal: Verbalizes/displays adequate comfort level or baseline comfort level  Description: Interventions:  - Encourage patient to monitor pain and request assistance  - Assess pain using appropriate pain scale  - Administer analgesics based on type and severity of pain and evaluate response  - Implement non-pharmacological measures as appropriate and evaluate response  - Consider cultural and social influences on pain and pain management  - Notify physician/advanced practitioner if interventions unsuccessful or patient reports new pain  Outcome: Progressing     Problem: INFECTION - ADULT  Goal: Absence or prevention of progression during hospitalization  Description: INTERVENTIONS:  - Assess and monitor for signs and symptoms of infection  - Monitor lab/diagnostic results  - Monitor all insertion sites, i.e. indwelling lines, tubes, and drains  - Monitor endotracheal if appropriate and nasal secretions for changes in amount and color  - [place] appropriate cooling/warming therapies per order  - Administer medications as ordered  - Instruct and encourage patient and family to use good hand hygiene technique  - Identify and instruct in appropriate isolation precautions for identified infection/condition  Outcome: Progressing  Goal: Absence of fever/infection during neutropenic period  Description: INTERVENTIONS:  - Monitor WBC    Outcome: Progressing     Problem: SAFETY ADULT  Goal: Patient will remain free of falls  Description: INTERVENTIONS:  - Educate patient/family on patient safety including physical limitations  - Instruct patient to call for assistance with activity   - Consult OT/PT to assist with strengthening/mobility   - Keep Call bell within reach  - Keep bed low and locked with side rails adjusted as appropriate  - Keep care items and personal belongings within reach  - Initiate and maintain comfort rounds  - Make Fall Risk Sign visible to staff  - Offer Toileting every  Hours,  in advance of need  - Initiate/Maintain alarm  - Obtain necessary fall risk management equipment:   - Apply yellow socks and bracelet for high fall risk patients  - Consider moving patient to room near nurses station  Outcome: Progressing  Goal: Maintain or return to baseline ADL function  Description: INTERVENTIONS:  -  Assess patient's ability to carry out ADLs; assess patient's baseline for ADL function and identify physical deficits which impact ability to perform ADLs (bathing, care of mouth/teeth, toileting, grooming, dressing, etc.)  - Assess/evaluate cause of self-care deficits   - Assess range of motion  - Assess patient's mobility; develop plan if impaired  - Assess patient's need for assistive devices and provide as appropriate  - Encourage maximum independence but intervene and supervise when necessary  - Involve family in performance of ADLs  - Assess for home care needs following discharge   - Consider OT consult to assist with ADL evaluation and planning for discharge  - Provide patient education as appropriate  Outcome: Progressing  Goal: Maintains/Returns to pre admission functional level  Description: INTERVENTIONS:  - Perform AM-PAC 6 Click Basic Mobility/ Daily Activity assessment daily.  - Set and communicate daily mobility goal to care team and patient/family/caregiver.   - Collaborate with rehabilitation services on mobility goals if consulted  - Perform Range of Motion  times a day.  - Reposition patient every  hours.  - Dangle patient  times a day  - Stand patient  times a day  - Ambulate patient  times a day  - Out of bed to chair  times a day   - Out of bed for meals  times a day  - Out of bed for toileting  - Record patient progress and toleration of activity level   Outcome: Progressing     Problem: DISCHARGE PLANNING  Goal: Discharge to home or other facility with appropriate resources  Description: INTERVENTIONS:  - Identify barriers to discharge w/patient and caregiver  - Arrange for  needed discharge resources and transportation as appropriate  - Identify discharge learning needs (meds, wound care, etc.)  - Arrange for interpretive services to assist at discharge as needed  - Refer to Case Management Department for coordinating discharge planning if the patient needs post-hospital services based on physician/advanced practitioner order or complex needs related to functional status, cognitive ability, or social support system  Outcome: Progressing     Problem: Knowledge Deficit  Goal: Patient/family/caregiver demonstrates understanding of disease process, treatment plan, medications, and discharge instructions  Description: Complete learning assessment and assess knowledge base.  Interventions:  - Provide teaching at level of understanding  - Provide teaching via preferred learning methods  Outcome: Progressing

## 2024-01-26 NOTE — ASSESSMENT & PLAN NOTE
"Presented with right facial swelling, reported dry mouth a few days prior  CT revealed \"Right-sided sialoadenitis involving both the right parotid and submandibular glands with adjacent associated facial cellulitis. No discrete collection. No definite sialolith detected. Infectious or inflammatory processes should be excluded especially since 2 discrete salivary glands are involved.  ENT following, appreciate their ongoing recommendations - frequent massage over salivary glands, warm compress  On IV Unasyn  Pain controlled  On room air, no respiratory compromise  Patient reports tolerating diet,    Likely d/c soon after po intake improves  "

## 2024-01-26 NOTE — PROGRESS NOTES
"[facility]  Progress Note  Name: Jeanne Lyon I  MRN: [id number]  Unit/Bed#: [facility] 926-01 I Date of Admission: 1/22/2024   Date of Service: 1/25/2024 I Hospital Day: 3    Assessment/Plan   * Acute sialoadenitis  Assessment & Plan  Presented with right facial swelling, reported dry mouth a few days prior  CT revealed \"Right-sided sialoadenitis involving both the right parotid and submandibular glands with adjacent associated facial cellulitis. No discrete collection. No definite sialolith detected. Infectious or inflammatory processes should be excluded especially since 2 discrete salivary glands are involved.  ENT following, appreciate their ongoing recommendations - frequent massage over salivary glands, warm compress  On IV Unasyn  Pain controlled  On room air, no respiratory compromise  Patient reports tolerating diet,    Likely d/c soon after po intake improves    Hypokalemia  Assessment & Plan  Replete and monitor   Mag wnl    Sepsis (HCC)  Assessment & Plan  POA aeb tachycardia and leukocytosis   Blood cultures were not drawn on admission  Source as above  Continue Unasyn  Trend fever curve, WBCs-remained afebrile.    GERD (gastroesophageal reflux disease)  Assessment & Plan  Continue Pepcid 20 mg daily.    Anxiety  Assessment & Plan  On Ativan 1 mg 3 times daily as needed    Sleep apnea treated with nocturnal BiPAP  Assessment & Plan  Currently on BiPAP at night.               VTE Pharmacologic Prophylaxis:   Moderate Risk (Score 3-4) - Pharmacological DVT Prophylaxis Ordered: enoxaparin (Lovenox).    Mobility:   Basic Mobility Inpatient Raw Score: 23  JH-HLM Goal: 7: Walk 25 feet or more  JH-HLM Achieved: 6: Walk 10 steps or more      Patient Centered Rounds: I performed bedside rounds with nursing staff today.   Discussions with Specialists or Other Care Team Provider:     Education and Discussions with Family / Patient: Updated  () at " bedside.    Total Time Spent on Date of Encounter in care of patient: 35 mins. This time was spent on one or more of the following: performing physical exam; counseling and coordination of care; obtaining or reviewing history; documenting in the medical record; reviewing/ordering tests, medications or procedures; communicating with other healthcare professionals and discussing with patient's family/caregivers.    Current Length of Stay: 3 day(s)  Current Patient Status: Inpatient   Certification Statement: The patient will continue to require additional inpatient hospital stay due to poor po intake  Discharge Plan: Anticipate discharge in 24-48 hrs to home with home services.    Code Status: Level 1 - Full Code    Subjective:   Patient seen and examined.  No events overnight.  Reported that swelling is improving.  Still with poor p.o. intake.  Discussed with the patient about warm compress and sour candy     Objective:     Vitals:   Temp (24hrs), Av.8 °F (36.6 °C), Min:97.6 °F (36.4 °C), Max:97.9 °F (36.6 °C)    Temp:  [97.6 °F (36.4 °C)-97.9 °F (36.6 °C)] 97.9 °F (36.6 °C)  HR:  [89-93] 89  Resp:  [16-20] 20  BP: (128-160)/(84-93) 135/84  SpO2:  [93 %-99 %] 99 %  There is no height or weight on file to calculate BMI.     Input and Output Summary (last 24 hours):     Intake/Output Summary (Last 24 hours) at [address]246  Last data filed at 2024 1900  Gross per 24 hour   Intake 2402.5 ml   Output 825 ml   Net 1577.5 ml       Physical Exam:   Physical Exam  HENT:      Head:      Comments: Right parotid swelling noted     Nose: Nose normal.   Cardiovascular:      Rate and Rhythm: Normal rate.      Pulses: Normal pulses.   Abdominal:      General: There is no distension.   Skin:     General: Skin is warm.   Neurological:      Mental Status: She is alert. Mental status is at baseline.          Additional Data:     Labs:  Results from last 7 days   Lab Units 24  0548 24  0529 24  0422  01/22/24  1857   WBC Thousand/uL 13.72*   < > 26.23* 20.20*   HEMOGLOBIN g/dL 13.0   < > 14.8 15.0   HEMATOCRIT % 41.7   < > 46.6* 46.9*   PLATELETS Thousands/uL 304   < > 315 363   NEUTROS PCT %  --   --   --  82*   LYMPHS PCT %  --   --   --  12*   LYMPHO PCT %  --   --  8*  --    MONOS PCT %  --   --   --  5   MONO PCT %  --   --  4  --    EOS PCT %  --   --  0 0    < > = values in this interval not displayed.     Results from last 7 days   Lab Units 01/25/24  0548 01/24/24  0529 01/23/24  0638   SODIUM mmol/L 142   < > 134*   POTASSIUM mmol/L 3.0*   < > 5.4*   CHLORIDE mmol/L 106   < > 101   CO2 mmol/L 26   < > 23   BUN mg/dL 4*   < > 8   CREATININE mg/dL 0.67   < > 0.78   ANION GAP mmol/L 10   < > 10   CALCIUM mg/dL 8.2*   < > 8.7   ALBUMIN g/dL  --   --  4.1   TOTAL BILIRUBIN mg/dL  --   --  0.85   ALK PHOS U/L  --   --  135*   ALT U/L  --   --  8   AST U/L  --   --  21   GLUCOSE RANDOM mg/dL 132   < > 161*    < > = values in this interval not displayed.                       Lines/Drains:  Invasive Devices       Peripheral Intravenous Line  Duration             Peripheral IV 01/23/24 Right Antecubital 1 day    Peripheral IV 01/24/24 Dorsal (posterior);Right Hand 1 day                          Imaging: No pertinent imaging reviewed.    Recent Cultures (last 7 days):         Last 24 Hours Medication List:   Current Facility-Administered Medications   Medication Dose Route Frequency Provider Last Rate    acetaminophen  975 mg Oral Q8H [facility] Jack Menchaca MD      ampicillin-sulbactam  3 g Intravenous Q6H Jack Menchaca MD 3 g (01/25/24 1746)    ARIPiprazole  10 mg Oral Daily Jack Menchaca MD      buPROPion  150 mg Oral Daily Jack Menchaca MD      docusate sodium  100 mg Oral BID Jack Menchaca MD      DULoxetine  60 mg Oral Daily Jack Menchaca MD      enoxaparin  40 mg Subcutaneous Daily Jack Menchaca MD      famotidine  20 mg Oral Daily Jack Menchaca MD       hydrALAZINE  10 mg Intravenous Q4H PRN Jack Menchaca MD      lamoTRIgine  200 mg Oral Daily Jack Menchaca MD      LORazepam  1 mg Oral TID PRN Jack Menchaca MD      morphine injection  2 mg Intravenous Q3H PRN Jordyn S Chente, CRNP      morphine injection  4 mg Intravenous Q3H PRN Jordyn S Chente, CRNP      ondansetron  4 mg Intravenous Q6H PRN Jordyn S Chente, CRNP      oxybutynin  15 mg Oral Daily Jack Menchaca MD      potassium chloride  40 mEq Oral Once Lindsay Brown MD      sodium chloride  75 mL/hr Intravenous Continuous Jordyn S Chente, CRNP 75 mL/hr (01/25/24 1532)        Today, Patient Was Seen By: Lindsay Brown MD    **Please Note: This note may have been constructed using a voice recognition system.**

## 2024-01-26 NOTE — PLAN OF CARE
Problem: PAIN - ADULT  Goal: Verbalizes/displays adequate comfort level or baseline comfort level  Description: Interventions:  - Encourage patient to monitor pain and request assistance  - Assess pain using appropriate pain scale  - Administer analgesics based on type and severity of pain and evaluate response  - Implement non-pharmacological measures as appropriate and evaluate response  - Consider cultural and social influences on pain and pain management  - Notify physician/advanced practitioner if interventions unsuccessful or patient reports new pain  1/26/2024 1617 by Dina Wright RN  Outcome: Progressing  1/26/2024 1617 by Dina Wright RN  Outcome: Progressing     Problem: INFECTION - ADULT  Goal: Absence or prevention of progression during hospitalization  Description: INTERVENTIONS:  - Assess and monitor for signs and symptoms of infection  - Monitor lab/diagnostic results  - Monitor all insertion sites, i.e. indwelling lines, tubes, and drains  - Monitor endotracheal if appropriate and nasal secretions for changes in amount and color  - [place] appropriate cooling/warming therapies per order  - Administer medications as ordered  - Instruct and encourage patient and family to use good hand hygiene technique  - Identify and instruct in appropriate isolation precautions for identified infection/condition  1/26/2024 1617 by Dina Wright RN  Outcome: Progressing  1/26/2024 1617 by Dina Wright RN  Outcome: Progressing  Goal: Absence of fever/infection during neutropenic period  Description: INTERVENTIONS:  - Monitor WBC    1/26/2024 1617 by Dina Wright RN  Outcome: Progressing  1/26/2024 1617 by Dina Wright RN  Outcome: Progressing     Problem: SAFETY ADULT  Goal: Patient will remain free of falls  Description: INTERVENTIONS:  - Educate patient/family on patient safety including physical limitations  - Instruct patient to call for assistance with activity   - Consult OT/PT to assist with  strengthening/mobility   - Keep Call bell within reach  - Keep bed low and locked with side rails adjusted as appropriate  - Keep care items and personal belongings within reach  - Initiate and maintain comfort rounds  - Make Fall Risk Sign visible to staff  - Offer Toileting every 2 Hours, in advance of need  - Initiate/Maintain alarm  - Obtain necessary fall risk management equipment  - Apply yellow socks and bracelet for high fall risk patients  - Consider moving patient to room near nurses station  1/26/2024 1617 by Dina Wright RN  Outcome: Progressing  1/26/2024 1617 by Dina Wright RN  Outcome: Progressing  Goal: Maintain or return to baseline ADL function  Description: INTERVENTIONS:  -  Assess patient's ability to carry out ADLs; assess patient's baseline for ADL function and identify physical deficits which impact ability to perform ADLs (bathing, care of mouth/teeth, toileting, grooming, dressing, etc.)  - Assess/evaluate cause of self-care deficits   - Assess range of motion  - Assess patient's mobility; develop plan if impaired  - Assess patient's need for assistive devices and provide as appropriate  - Encourage maximum independence but intervene and supervise when necessary  - Involve family in performance of ADLs  - Assess for home care needs following discharge   - Consider OT consult to assist with ADL evaluation and planning for discharge  - Provide patient education as appropriate  1/26/2024 1617 by Dina Wright RN  Outcome: Progressing  1/26/2024 1617 by Dina Wright RN  Outcome: Progressing  Goal: Maintains/Returns to pre admission functional level  Description: INTERVENTIONS:  - Perform AM-PAC 6 Click Basic Mobility/ Daily Activity assessment daily.  - Set and communicate daily mobility goal to care team and patient/family/caregiver.   - Collaborate with rehabilitation services on mobility goals if consulted  - Perform Range of Motion   - Reposition patient every 2 hours.  - Ambulate  patient   - Out of bed to chair   - Out of bed for meals   - Out of bed for toileting  - Record patient progress and toleration of activity level   1/26/2024 1617 by Dina Wright RN  Outcome: Progressing  1/26/2024 1617 by Dina Wright RN  Outcome: Progressing     Problem: DISCHARGE PLANNING  Goal: Discharge to home or other facility with appropriate resources  Description: INTERVENTIONS:  - Identify barriers to discharge w/patient and caregiver  - Arrange for needed discharge resources and transportation as appropriate  - Identify discharge learning needs (meds, wound care, etc.)  - Arrange for interpretive services to assist at discharge as needed  - Refer to Case Management Department for coordinating discharge planning if the patient needs post-hospital services based on physician/advanced practitioner order or complex needs related to functional status, cognitive ability, or social support system  1/26/2024 1617 by Dina Wright RN  Outcome: Progressing  1/26/2024 1617 by Dina Wright RN  Outcome: Progressing     Problem: Knowledge Deficit  Goal: Patient/family/caregiver demonstrates understanding of disease process, treatment plan, medications, and discharge instructions  Description: Complete learning assessment and assess knowledge base.  Interventions:  - Provide teaching at level of understanding  - Provide teaching via preferred learning methods  1/26/2024 1617 by Dina Wright RN  Outcome: Progressing  1/26/2024 1617 by Dina Wright RN  Outcome: Progressing     Problem: Nutrition/Hydration-ADULT  Goal: Nutrient/Hydration intake appropriate for improving, restoring or maintaining nutritional needs  Description: Monitor and assess patient's nutrition/hydration status for malnutrition. Collaborate with interdisciplinary team and initiate plan and interventions as ordered.  Monitor patient's weight and dietary intake as ordered or per policy. Utilize nutrition screening tool and intervene as necessary.  Determine patient's food preferences and provide high-protein, high-caloric foods as appropriate.     INTERVENTIONS:  - Monitor oral intake, urinary output, labs, and treatment plans  - Assess nutrition and hydration status and recommend course of action  - Evaluate amount of meals eaten  - Assist patient with eating if necessary   - Allow adequate time for meals  - Recommend/ encourage appropriate diets, oral nutritional supplements, and vitamin/mineral supplements  - Order, calculate, and assess calorie counts as needed  - Recommend, monitor, and adjust tube feedings and TPN/PPN based on assessed needs  - Assess need for intravenous fluids  - Provide specific nutrition/hydration education as appropriate  - Include patient/family/caregiver in decisions related to nutrition  1/26/2024 1617 by Dina Wright RN  Outcome: Progressing  1/26/2024 1617 by Dina Wright RN  Outcome: Progressing

## 2024-01-26 NOTE — DISCHARGE INSTR - AVS FIRST PAGE
Encourage hydration  Frequent massage over salivary glands, warm compress  Sour candies encourage saliva production  Please follow-up with ENT as outpatient.  If the pain and swelling is getting worse please contact PCP or ENT for outpatient follow-up.  Finish the course of antibiotics  Please monitor blood pressure at home.  Take the blood pressure medication as prescribed and outpatient follow-up with the primary care physician for management of blood pressure

## 2024-01-26 NOTE — ASSESSMENT & PLAN NOTE
POA aeb tachycardia and leukocytosis   Blood cultures were not drawn on admission  Source as above  Continue Unasyn  Trend fever curve, WBCs-remained afebrile.

## 2024-01-27 VITALS
DIASTOLIC BLOOD PRESSURE: 69 MMHG | OXYGEN SATURATION: 97 % | HEART RATE: 94 BPM | TEMPERATURE: 98.2 F | RESPIRATION RATE: 18 BRPM | SYSTOLIC BLOOD PRESSURE: 116 MMHG

## 2024-01-27 PROCEDURE — 99239 HOSP IP/OBS DSCHRG MGMT >30: CPT | Performed by: FAMILY MEDICINE

## 2024-01-27 RX ORDER — AMOXICILLIN AND CLAVULANATE POTASSIUM 875; 125 MG/1; MG/1
1 TABLET, FILM COATED ORAL EVERY 12 HOURS SCHEDULED
Qty: 12 TABLET | Refills: 0 | Status: SHIPPED | OUTPATIENT
Start: 2024-01-27 | End: 2024-02-02

## 2024-01-27 RX ORDER — DOCUSATE SODIUM 100 MG/1
100 CAPSULE, LIQUID FILLED ORAL 2 TIMES DAILY
Start: 2024-01-27

## 2024-01-27 RX ORDER — KETOROLAC TROMETHAMINE 30 MG/ML
15 INJECTION, SOLUTION INTRAMUSCULAR; INTRAVENOUS ONCE
Status: COMPLETED | OUTPATIENT
Start: 2024-01-27 | End: 2024-01-27

## 2024-01-27 RX ORDER — AMLODIPINE BESYLATE 2.5 MG/1
2.5 TABLET ORAL DAILY
Qty: 30 TABLET | Refills: 0 | Status: SHIPPED | OUTPATIENT
Start: 2024-01-28

## 2024-01-27 RX ORDER — FAMOTIDINE 20 MG/1
20 TABLET, FILM COATED ORAL DAILY
Start: 2024-01-27

## 2024-01-27 RX ORDER — LAMOTRIGINE 200 MG/1
200 TABLET ORAL DAILY
Start: 2024-01-27

## 2024-01-27 RX ADMIN — DOCUSATE SODIUM 100 MG: 100 CAPSULE, LIQUID FILLED ORAL at 09:29

## 2024-01-27 RX ADMIN — AMLODIPINE BESYLATE 2.5 MG: 2.5 TABLET ORAL at 09:29

## 2024-01-27 RX ADMIN — ACETAMINOPHEN 975 MG: 325 TABLET, FILM COATED ORAL at 05:46

## 2024-01-27 RX ADMIN — ONDANSETRON 4 MG: 2 INJECTION INTRAMUSCULAR; INTRAVENOUS at 00:25

## 2024-01-27 RX ADMIN — FAMOTIDINE 20 MG: 20 TABLET, FILM COATED ORAL at 09:29

## 2024-01-27 RX ADMIN — DULOXETINE HYDROCHLORIDE 60 MG: 60 CAPSULE, DELAYED RELEASE ORAL at 09:29

## 2024-01-27 RX ADMIN — AMOXICILLIN AND CLAVULANATE POTASSIUM 1 TABLET: 875; 125 TABLET, FILM COATED ORAL at 09:30

## 2024-01-27 RX ADMIN — LAMOTRIGINE 200 MG: 100 TABLET ORAL at 09:29

## 2024-01-27 RX ADMIN — KETOROLAC TROMETHAMINE 15 MG: 30 INJECTION, SOLUTION INTRAMUSCULAR at 01:19

## 2024-01-27 RX ADMIN — OXYBUTYNIN 15 MG: 10 TABLET, FILM COATED, EXTENDED RELEASE ORAL at 09:29

## 2024-01-27 RX ADMIN — ONDANSETRON 4 MG: 2 INJECTION INTRAMUSCULAR; INTRAVENOUS at 09:35

## 2024-01-27 RX ADMIN — ARIPIPRAZOLE 10 MG: 10 TABLET ORAL at 09:30

## 2024-01-27 RX ADMIN — ENOXAPARIN SODIUM 40 MG: 40 INJECTION SUBCUTANEOUS at 09:30

## 2024-01-27 RX ADMIN — HYDRALAZINE HYDROCHLORIDE 10 MG: 20 INJECTION INTRAMUSCULAR; INTRAVENOUS at 12:21

## 2024-01-27 RX ADMIN — BUPROPION HYDROCHLORIDE 150 MG: 150 TABLET, FILM COATED, EXTENDED RELEASE ORAL at 09:29

## 2024-01-27 NOTE — PLAN OF CARE
Problem: PAIN - ADULT  Goal: Verbalizes/displays adequate comfort level or baseline comfort level  Description: Interventions:  - Encourage patient to monitor pain and request assistance  - Assess pain using appropriate pain scale  - Administer analgesics based on type and severity of pain and evaluate response  - Implement non-pharmacological measures as appropriate and evaluate response  - Consider cultural and social influences on pain and pain management  - Notify physician/advanced practitioner if interventions unsuccessful or patient reports new pain  Outcome: Progressing     Problem: INFECTION - ADULT  Goal: Absence or prevention of progression during hospitalization  Description: INTERVENTIONS:  - Assess and monitor for signs and symptoms of infection  - Monitor lab/diagnostic results  - Monitor all insertion sites, i.e. indwelling lines, tubes, and drains  - Monitor endotracheal if appropriate and nasal secretions for changes in amount and color  - [place] appropriate cooling/warming therapies per order  - Administer medications as ordered  - Instruct and encourage patient and family to use good hand hygiene technique  - Identify and instruct in appropriate isolation precautions for identified infection/condition  Outcome: Progressing  Goal: Absence of fever/infection during neutropenic period  Description: INTERVENTIONS:  - Monitor WBC    Outcome: Progressing     Problem: SAFETY ADULT  Goal: Patient will remain free of falls  Description: INTERVENTIONS:  - Educate patient/family on patient safety including physical limitations  - Instruct patient to call for assistance with activity   - Consult OT/PT to assist with strengthening/mobility   - Keep Call bell within reach  - Keep bed low and locked with side rails adjusted as appropriate  - Keep care items and personal belongings within reach  - Initiate and maintain comfort rounds  - Make Fall Risk Sign visible to staff  - Offer Toileting every 2 Hours,  in advance of need  - Initiate/Maintain alarm  - Obtain necessary fall risk management equipment  - Apply yellow socks and bracelet for high fall risk patients  - Consider moving patient to room near nurses station  Outcome: Progressing  Goal: Maintain or return to baseline ADL function  Description: INTERVENTIONS:  -  Assess patient's ability to carry out ADLs; assess patient's baseline for ADL function and identify physical deficits which impact ability to perform ADLs (bathing, care of mouth/teeth, toileting, grooming, dressing, etc.)  - Assess/evaluate cause of self-care deficits   - Assess range of motion  - Assess patient's mobility; develop plan if impaired  - Assess patient's need for assistive devices and provide as appropriate  - Encourage maximum independence but intervene and supervise when necessary  - Involve family in performance of ADLs  - Assess for home care needs following discharge   - Consider OT consult to assist with ADL evaluation and planning for discharge  - Provide patient education as appropriate  Outcome: Progressing  Goal: Maintains/Returns to pre admission functional level  Description: INTERVENTIONS:  - Perform AM-PAC 6 Click Basic Mobility/ Daily Activity assessment daily.  - Set and communicate daily mobility goal to care team and patient/family/caregiver.   - Collaborate with rehabilitation services on mobility goals if consulted  - Perform Range of Motion   - Reposition patient every 2 hours.  - Ambulate patient   - Out of bed to chair   - Out of bed for meals   - Out of bed for toileting  - Record patient progress and toleration of activity level   Outcome: Progressing     Problem: DISCHARGE PLANNING  Goal: Discharge to home or other facility with appropriate resources  Description: INTERVENTIONS:  - Identify barriers to discharge w/patient and caregiver  - Arrange for needed discharge resources and transportation as appropriate  - Identify discharge learning needs (meds, wound  care, etc.)  - Arrange for interpretive services to assist at discharge as needed  - Refer to Case Management Department for coordinating discharge planning if the patient needs post-hospital services based on physician/advanced practitioner order or complex needs related to functional status, cognitive ability, or social support system  Outcome: Progressing     Problem: Knowledge Deficit  Goal: Patient/family/caregiver demonstrates understanding of disease process, treatment plan, medications, and discharge instructions  Description: Complete learning assessment and assess knowledge base.  Interventions:  - Provide teaching at level of understanding  - Provide teaching via preferred learning methods  Outcome: Progressing

## 2024-01-27 NOTE — ASSESSMENT & PLAN NOTE
Patient noted to have consistently elevated blood pressure reading.  On low-dose of Norvasc.  As needed hydralazine.

## 2024-01-27 NOTE — PROGRESS NOTES
"[facility]  Progress Note  Name: Jeanne Lyon I  MRN: [id number]  Unit/Bed#: [facility] 926-01 I Date of Admission: 1/22/2024   Date of Service: 1/26/2024 I Hospital Day: 4    Assessment/Plan   * Acute sialoadenitis  Assessment & Plan  Presented with right facial swelling, reported dry mouth a few days prior  CT revealed \"Right-sided sialoadenitis involving both the right parotid and submandibular glands with adjacent associated facial cellulitis. No discrete collection. No definite sialolith detected. Infectious or inflammatory processes should be excluded especially since 2 discrete salivary glands are involved.  ENT following, appreciate their ongoing recommendations - frequent massage over salivary glands, warm compress  On IV Unasyn  Pain controlled  On room air, no respiratory compromise  Patient reports tolerating diet,    Likely d/c soon after po intake improves-p.o. intake improved.    Elevated blood pressure reading  Assessment & Plan  Patient noted to have consistently elevated blood pressure reading.  On low-dose of Norvasc.  As needed hydralazine.    Hypokalemia  Assessment & Plan  Replete and monitor   Mag wnl    Sepsis (HCC)  Assessment & Plan  POA aeb tachycardia and leukocytosis   Blood cultures were not drawn on admission  Source as above  Continue Unasyn  Trend fever curve, WBCs-remained afebrile.    GERD (gastroesophageal reflux disease)  Assessment & Plan  Continue Pepcid 20 mg daily.    Anxiety  Assessment & Plan  On Ativan 1 mg 3 times daily as needed    Sleep apnea treated with nocturnal BiPAP  Assessment & Plan  Currently on BiPAP at night.               VTE Pharmacologic Prophylaxis:   Moderate Risk (Score 3-4) - Pharmacological DVT Prophylaxis Ordered: enoxaparin (Lovenox).    Mobility:   Basic Mobility Inpatient Raw Score: 23  JH-HLM Goal: 7: Walk 25 feet or more  JH-HLM Achieved: 6: Walk 10 steps or more  HLM Goal achieved. Continue to encourage " appropriate mobility.    Patient Centered Rounds: I performed bedside rounds with nursing staff today.   Discussions with Specialists or Other Care Team Provider:     Education and Discussions with Family / Patient: Updated  () at bedside.    Total Time Spent on Date of Encounter in care of patient: 35 mins. This time was spent on one or more of the following: performing physical exam; counseling and coordination of care; obtaining or reviewing history; documenting in the medical record; reviewing/ordering tests, medications or procedures; communicating with other healthcare professionals and discussing with patient's family/caregivers.    Current Length of Stay: 4 day(s)  Current Patient Status: Inpatient   Certification Statement: The patient will continue to require additional inpatient hospital stay due to elevated blood pressure  Discharge Plan: Anticipate discharge tomorrow to home.    Code Status: Level 1 - Full Code    Subjective:   Patient seen and neck 70.  No specific complaints offered.  Reported that she had some elevated blood pressure readings in PCPs office but it is not consistent.  No previous history of hypertension and was not on any medications    Objective:     Vitals:   Temp (24hrs), Av.1 °F (36.7 °C), Min:97.6 °F (36.4 °C), Max:98.4 °F (36.9 °C)    Temp:  [97.6 °F (36.4 °C)-98.4 °F (36.9 °C)] 98.2 °F (36.8 °C)  HR:  [88-98] 98  Resp:  [17-20] 18  BP: (150-194)/() 162/110  SpO2:  [95 %-98 %] 97 %  There is no height or weight on file to calculate BMI.     Input and Output Summary (last 24 hours):     Intake/Output Summary (Last 24 hours) at 2024 1914  Last data filed at 2024 1432  Gross per 24 hour   Intake 4029.25 ml   Output --   Net 4029.25 ml       Physical Exam:   Physical Exam  Constitutional:       General: She is not in acute distress.     Appearance: She is not ill-appearing.   HENT:      Head:      Comments: Right-sided parotitis appears to be  improving.  No significant tenderness swelling significantly better     Nose: Nose normal.   Eyes:      General: No scleral icterus.  Cardiovascular:      Rate and Rhythm: Normal rate and regular rhythm.      Pulses: Normal pulses.   Pulmonary:      Effort: No respiratory distress.   Abdominal:      General: There is no distension.   Musculoskeletal:         General: No swelling. Normal range of motion.   Skin:     General: Skin is warm.      Coloration: Skin is not jaundiced.   Neurological:      Mental Status: She is alert. Mental status is at baseline.          Additional Data:     Labs:  Results from last 7 days   Lab Units 01/26/24  1158 01/26/24  0445 01/24/24  0529 01/23/24  0422 01/22/24  1857   WBC Thousand/uL  --  8.52   < > 26.23* 20.20*   HEMOGLOBIN g/dL 12.7 11.4*   < > 14.8 15.0   HEMATOCRIT % 41.2 36.8   < > 46.6* 46.9*   PLATELETS Thousands/uL  --  316   < > 315 363   NEUTROS PCT %  --   --   --   --  82*   LYMPHS PCT %  --   --   --   --  12*   LYMPHO PCT %  --   --   --  8*  --    MONOS PCT %  --   --   --   --  5   MONO PCT %  --   --   --  4  --    EOS PCT %  --   --   --  0 0    < > = values in this interval not displayed.     Results from last 7 days   Lab Units 01/26/24  0445 01/24/24  0529 01/23/24  0638   SODIUM mmol/L 145   < > 134*   POTASSIUM mmol/L 3.3*   < > 5.4*   CHLORIDE mmol/L 109*   < > 101   CO2 mmol/L 28   < > 23   BUN mg/dL 5   < > 8   CREATININE mg/dL 0.73   < > 0.78   ANION GAP mmol/L 8   < > 10   CALCIUM mg/dL 8.2*   < > 8.7   ALBUMIN g/dL  --   --  4.1   TOTAL BILIRUBIN mg/dL  --   --  0.85   ALK PHOS U/L  --   --  135*   ALT U/L  --   --  8   AST U/L  --   --  21   GLUCOSE RANDOM mg/dL 83   < > 161*    < > = values in this interval not displayed.                       Lines/Drains:  Invasive Devices       Peripheral Intravenous Line  Duration             Peripheral IV 01/23/24 Right Antecubital 2 days    Peripheral IV 01/24/24 Dorsal (posterior);Right Hand 2 days                           Imaging: No pertinent imaging reviewed.    Recent Cultures (last 7 days):         Last 24 Hours Medication List:   Current Facility-Administered Medications   Medication Dose Route Frequency Provider Last Rate    acetaminophen  975 mg Oral Q8H [facility] Jack Menchaca MD      amLODIPine  2.5 mg Oral Daily Lindsay Brown MD      amoxicillin-clavulanate  1 tablet Oral Q12H [facility] Lindsay Brown MD      ARIPiprazole  10 mg Oral Daily Jack Menchaca MD      buPROPion  150 mg Oral Daily Jack Menchaca MD      docusate sodium  100 mg Oral BID Jack Menchaca MD      DULoxetine  60 mg Oral Daily Jack Menchaca MD      enoxaparin  40 mg Subcutaneous Daily Jack Menchaca MD      famotidine  20 mg Oral Daily Jack Menchaca MD      hydrALAZINE  10 mg Intravenous Q4H PRN Lindsay Brown MD      labetalol  10 mg Intravenous Q4H PRN Lindsay Brown MD      lamoTRIgine  200 mg Oral Daily Jack Menchaca MD      LORazepam  1 mg Oral TID PRN Jack Menchaca MD      morphine injection  2 mg Intravenous Q3H PRN Jordyn S Chente, CRNP      morphine injection  4 mg Intravenous Q3H PRN Jordyn S Chente, CRNP      ondansetron  4 mg Intravenous Q6H PRN Jordyn S Chente, CRNP      oxybutynin  15 mg Oral Daily Jack Menchaca MD          Today, Patient Was Seen By: Lindsay Brown MD    **Please Note: This note may have been constructed using a voice recognition system.**

## 2024-01-27 NOTE — PLAN OF CARE
Problem: PAIN - ADULT  Goal: Verbalizes/displays adequate comfort level or baseline comfort level  Description: Interventions:  - Encourage patient to monitor pain and request assistance  - Assess pain using appropriate pain scale  - Administer analgesics based on type and severity of pain and evaluate response  - Implement non-pharmacological measures as appropriate and evaluate response  - Consider cultural and social influences on pain and pain management  - Notify physician/advanced practitioner if interventions unsuccessful or patient reports new pain  Outcome: Progressing     Problem: INFECTION - ADULT  Goal: Absence or prevention of progression during hospitalization  Description: INTERVENTIONS:  - Assess and monitor for signs and symptoms of infection  - Monitor lab/diagnostic results  - Monitor all insertion sites, i.e. indwelling lines, tubes, and drains  - Monitor endotracheal if appropriate and nasal secretions for changes in amount and color  - [place] appropriate cooling/warming therapies per order  - Administer medications as ordered  - Instruct and encourage patient and family to use good hand hygiene technique  - Identify and instruct in appropriate isolation precautions for identified infection/condition  Outcome: Progressing  Goal: Absence of fever/infection during neutropenic period  Description: INTERVENTIONS:  - Monitor WBC    Outcome: Progressing     Problem: SAFETY ADULT  Goal: Patient will remain free of falls  Description: INTERVENTIONS:  - Educate patient/family on patient safety including physical limitations  - Instruct patient to call for assistance with activity   - Consult OT/PT to assist with strengthening/mobility   - Keep Call bell within reach  - Keep bed low and locked with side rails adjusted as appropriate  - Keep care items and personal belongings within reach  - Initiate and maintain comfort rounds  - Make Fall Risk Sign visible to staff  - Offer Toileting every 2 Hours,  in advance of need  - Initiate/Maintain alarm  - Obtain necessary fall risk management equipment  - Apply yellow socks and bracelet for high fall risk patients  - Consider moving patient to room near nurses station  Outcome: Progressing  Goal: Maintain or return to baseline ADL function  Description: INTERVENTIONS:  -  Assess patient's ability to carry out ADLs; assess patient's baseline for ADL function and identify physical deficits which impact ability to perform ADLs (bathing, care of mouth/teeth, toileting, grooming, dressing, etc.)  - Assess/evaluate cause of self-care deficits   - Assess range of motion  - Assess patient's mobility; develop plan if impaired  - Assess patient's need for assistive devices and provide as appropriate  - Encourage maximum independence but intervene and supervise when necessary  - Involve family in performance of ADLs  - Assess for home care needs following discharge   - Consider OT consult to assist with ADL evaluation and planning for discharge  - Provide patient education as appropriate  Outcome: Progressing  Goal: Maintains/Returns to pre admission functional level  Description: INTERVENTIONS:  - Perform AM-PAC 6 Click Basic Mobility/ Daily Activity assessment daily.  - Set and communicate daily mobility goal to care team and patient/family/caregiver.   - Collaborate with rehabilitation services on mobility goals if consulted  - Perform Range of Motion   - Reposition patient every 2 hours.  - Ambulate patient   - Out of bed to chair   - Out of bed for meals   - Out of bed for toileting  - Record patient progress and toleration of activity level   Outcome: Progressing     Problem: DISCHARGE PLANNING  Goal: Discharge to home or other facility with appropriate resources  Description: INTERVENTIONS:  - Identify barriers to discharge w/patient and caregiver  - Arrange for needed discharge resources and transportation as appropriate  - Identify discharge learning needs (meds, wound  care, etc.)  - Arrange for interpretive services to assist at discharge as needed  - Refer to Case Management Department for coordinating discharge planning if the patient needs post-hospital services based on physician/advanced practitioner order or complex needs related to functional status, cognitive ability, or social support system  Outcome: Progressing     Problem: Knowledge Deficit  Goal: Patient/family/caregiver demonstrates understanding of disease process, treatment plan, medications, and discharge instructions  Description: Complete learning assessment and assess knowledge base.  Interventions:  - Provide teaching at level of understanding  - Provide teaching via preferred learning methods  Outcome: Progressing     Problem: Nutrition/Hydration-ADULT  Goal: Nutrient/Hydration intake appropriate for improving, restoring or maintaining nutritional needs  Description: Monitor and assess patient's nutrition/hydration status for malnutrition. Collaborate with interdisciplinary team and initiate plan and interventions as ordered.  Monitor patient's weight and dietary intake as ordered or per policy. Utilize nutrition screening tool and intervene as necessary. Determine patient's food preferences and provide high-protein, high-caloric foods as appropriate.     INTERVENTIONS:  - Monitor oral intake, urinary output, labs, and treatment plans  - Assess nutrition and hydration status and recommend course of action  - Evaluate amount of meals eaten  - Assist patient with eating if necessary   - Allow adequate time for meals  - Recommend/ encourage appropriate diets, oral nutritional supplements, and vitamin/mineral supplements  - Order, calculate, and assess calorie counts as needed  - Recommend, monitor, and adjust tube feedings and TPN/PPN based on assessed needs  - Assess need for intravenous fluids  - Provide specific nutrition/hydration education as appropriate  - Include patient/family/caregiver in decisions  related to nutrition  Outcome: Progressing

## 2024-01-27 NOTE — ASSESSMENT & PLAN NOTE
"Presented with right facial swelling, reported dry mouth a few days prior  CT revealed \"Right-sided sialoadenitis involving both the right parotid and submandibular glands with adjacent associated facial cellulitis. No discrete collection. No definite sialolith detected. Infectious or inflammatory processes should be excluded especially since 2 discrete salivary glands are involved.  ENT following, appreciate their ongoing recommendations - frequent massage over salivary glands, warm compress  On IV Unasyn  Pain controlled  On room air, no respiratory compromise  Patient reports tolerating diet,    Likely d/c soon after po intake improves-p.o. intake improved.  "

## 2024-01-30 NOTE — DISCHARGE SUMMARY
Discharge Summary - [facility] Internal Medicine    Patient Information: Jeanne Lyon 73 y.o. female MRN: [id number]  Unit/Bed#: [facility] 926-01 Encounter: [id number]    Discharging Physician / Practitioner: Lindsay Brown MD  PCP: Chris Monique DO  Admission Date: 1/22/2024  Discharge Date: 1/27/2024    Disposition:     Home    Reason for Admission: Sialoadenitis     Discharge Diagnoses:     Principal Problem:    Acute sialoadenitis  Active Problems:    Sleep apnea treated with nocturnal BiPAP    Anxiety    GERD (gastroesophageal reflux disease)    Sepsis (HCC)    Hypokalemia    Elevated blood pressure reading  Resolved Problems:    * No resolved hospital problems. *      Consultations During Hospital Stay:  ENT    Procedures Performed:         Significant Findings / Test Results:     Ct sift tissue neck-Right-sided sialoadenitis involving both the right parotid and submandibular glands with adjacent associated facial cellulitis. No discrete collection. No definite sialolith detected. Infectious or inflammatory processes should be excluded especially since 2 discrete salivary glands are involved.     Incidental Findings:        Test Results Pending at Discharge (will require follow up):        Outpatient Tests Requested:      Complications:   none    Hospital Course:     Jeanne Lyon is a 73 y.o. female patient who originally presented to the hospital on 1/22/2024 due to right facial swelling and erythema with pain.  This is a 73-year-old female with past medical history significant for anxiety GERD depression obstructive sleep apnea and primary osteoarthritis came to the hospital with 1 day history of right-sided facial swelling.  Patient had rather rapid progression of the swelling and pain.  Patient had a CT soft tissue neck showing right-sided sialoadenitis involving the right parotid and submandibular glands with facial cellulitis.  Patient was admitted to the hospital started on intravenous  antibiotics.  ENT evaluated the patient and recommended conservative management.  With antibiotics patient pain and swelling was significantly better.  Her appetite improved.  While in the hospital her blood pressure remained persistently high.  Patient was started on oral antihypertensives with improvement in her. blood pressure.  Patient was able to tolerate a diet.  Patient will be discharged home to finish the course of antibiotics for a total of 10-day course.  Need outpatient follow-up with ENT for further management.  Patient encouraged to continue with the warm compress and parotid massage along with sour candy.  Need outpatient follow-up with the primary care physician for hypertension management.  For details refer to the chart    Condition at Discharge: good     Discharge Day Visit / Exam:     Subjective: Patient seen and examined.  Reported that her pain is significantly better.  Able to eat better.  Wants to go home today.  Initially in the morning blood pressure was on the higher side but after medication her blood pressure improved and patient will be discharged to follow-up  Vitals: Blood Pressure: 116/69 (01/27/24 1510)  Pulse: 94 (01/27/24 1510)  Temperature: 98.2 °F (36.8 °C) (01/27/24 1510)  Temp Source: Temporal (01/22/24 1814)  Respirations: 18 (01/27/24 1510)  SpO2: 97 % (01/27/24 1510)  Exam:   Physical Exam  HENT:      Head:      Comments: Right sided swelling is significantly better.  No significant tenderness on palpation     Nose: Nose normal.   Cardiovascular:      Rate and Rhythm: Normal rate and regular rhythm.   Pulmonary:      Comments: Decreased breath sounds bilateral  Musculoskeletal:         General: No swelling.   Skin:     General: Skin is warm.   Neurological:      General: No focal deficit present.      Mental Status: She is alert.         Discussion with Family:     Discharge instructions/Information to patient and family:   See after visit summary for information  provided to patient and family.      Provisions for Follow-Up Care:  See after visit summary for information related to follow-up care and any pertinent home health orders.      Planned Readmission:  none     Discharge Statement:  I spent45 minutes discharging the patient. This time was spent on the day of discharge. I had direct contact with the patient on the day of discharge. Greater than 50% of the total time was spent examining patient, answering all patient questions, arranging and discussing plan of care with patient as well as directly providing post-discharge instructions.  Additional time then spent on discharge activities.    Discharge Medications:  See after visit summary for reconciled discharge medications provided to patient and family.      ** Please Note: This note has been constructed using a voice recognition system **

## 2024-02-01 ENCOUNTER — CLINICAL SUPPORT (OUTPATIENT)
Dept: BARIATRICS | Facility: CLINIC | Age: 74
End: 2024-02-01

## 2024-02-01 VITALS — WEIGHT: 182.2 LBS | BODY MASS INDEX: 30.35 KG/M2 | HEIGHT: 65 IN

## 2024-02-01 DIAGNOSIS — R63.5 ABNORMAL WEIGHT GAIN: Primary | ICD-10-CM

## 2024-02-01 PROCEDURE — RECHECK

## 2024-02-21 PROBLEM — A41.9 SEPSIS (HCC): Status: RESOLVED | Noted: 2024-01-23 | Resolved: 2024-02-21

## 2024-02-21 PROBLEM — Z13.1 SCREENING FOR DIABETES MELLITUS: Status: RESOLVED | Noted: 2019-06-17 | Resolved: 2024-02-21

## 2024-02-21 PROBLEM — Z13.29 SCREENING FOR THYROID DISORDER: Status: RESOLVED | Noted: 2019-06-17 | Resolved: 2024-02-21

## 2024-02-22 ENCOUNTER — CLINICAL SUPPORT (OUTPATIENT)
Dept: BARIATRICS | Facility: CLINIC | Age: 74
End: 2024-02-22

## 2024-02-22 VITALS — HEIGHT: 65 IN | BODY MASS INDEX: 30.35 KG/M2 | WEIGHT: 182.2 LBS

## 2024-02-22 DIAGNOSIS — R63.5 ABNORMAL WEIGHT GAIN: Primary | ICD-10-CM

## 2024-02-22 PROCEDURE — RECHECK

## 2024-03-10 NOTE — PROGRESS NOTES
"Name was verified by patient stating name? Yes   verified by patient stating? Yes  Verified the patient is alone? Yes  Offered patient a live visit but are now consenting to this virtual visit? Yes  Verified with the patient this visit will go towards their pre paid charges? Yes   Verified with the patient they are in the state of [place]? Yes    Weight Management Medical Nutrition Assessment  Is here for [facility] f/u.  Self reported current wt: 175 lbs. She has 29.6 lbs since 2019. No longer feeling guilt when eating and her  cannot. Finding she is viewing food much differently which has been a positive for her. Water intake much improved. She reports back pain is also improving. She has no concerns at this time. Keep up the great work!    Dislikes:      Anthropometric Measurements  Start Weight (lbs): 204.6 lbs 19  Current wt:  175 lbs  %TBW loss: 14.5%   Ideal Body Weight (lbs): 150.1 lbs BMI 25 (65\" 125 lbs)  Goal Weight (lbs): 160 lbs (last weighed this a long time ago)  Highest: 208 lbs  Lowest:  135 lbs  UBW: 160 lbs     Weight Loss History  Previous weight loss attempts: Commercial Programs (Weight Watchers, BreathalEyesig, etc.)  Exercise  Self Created Diets (Portion Control, Healthy Food Choices, etc.)     Food and Nutrition Related History  Wake up: 10:00     Bed Time: 12:00     Food Recall  Breakfast: 10:30: cream of wheat, milk, fruit OR piece of toast, yogurt OR scrambled eggs, toast  Snack: skip   Lunch: 2:00: healthy choice power bowls, diet coke OR opposite of breakfast  Snack: skip  Dinner: 7 p.m. healthy choice meal OR eggs, veggies, yogurt dip  Snack: skip     Beverages: diet coke, coffee, water   Volume of beverage intake:  96 oz water, 1 cup coffee w/h/h, few diet coke/day     Weekends: Same  Cravings: sweets  Trouble area of day: 6:00 p.m. >>>better     Frequency of Eating out: every few weeks  Food restrictions: n/a  Cooking: self  Food Shopping: self     Physical Activity " Intake  Activity: none currently  Frequency: n/a  Physical limitations/barriers to exercise:  back pain>>>better     Estimated Needs  Energy  [employer] Energy Needs: BMR 1305    1# loss weekly sedentary:   1066            1# loss weekly lightly active:  1294  Protein:68-85 gm      (1.2-1.5g/kg IBW)  Fluid: 66 oz     (35mL/kg IBW)     Nutrition Diagnosis  No;    Current BMI is WNL for age     Nutrition Intervention  Nutrition Prescription  Calories: 6063-9045  Protein: >65 gm     Meal Plan  Breakfast: 200-275, 12-20  Snack: skip  Lunch: 400, 15-25  Snack 160, 15  Dinner: 350-400, 25  Snack: skip     Nutrition Education:    Healthy Core Manual  Calorie controlled menu  Lean protein food choices  Healthy snack options  Food journaling tips  Dining out     Nutrition Counseling:  Strategies: meal planning, portion sizes, healthy snack choices, hydration, fiber intake, protein intake, exercise, food journal        Monitoring and Evaluation:  Evaluation criteria:  Energy Intake  Meet protein needs  Maintain adequate hydration  Monitor weekly weight  Meal planning/preparation  Food journal   Decreased portions at mealtimes and snacks  Physical activity      Barriers to learning:none  Readiness to change:  action  Comprehension: very good  Expected Compliance: very good

## 2024-03-13 ENCOUNTER — TELEPHONE (OUTPATIENT)
Age: 74
End: 2024-03-13

## 2024-03-13 ENCOUNTER — CLINICAL SUPPORT (OUTPATIENT)
Dept: BARIATRICS | Facility: CLINIC | Age: 74
End: 2024-03-13

## 2024-03-13 VITALS — BODY MASS INDEX: 29.16 KG/M2 | HEIGHT: 65 IN | WEIGHT: 175 LBS

## 2024-03-13 DIAGNOSIS — R63.5 ABNORMAL WEIGHT GAIN: Primary | ICD-10-CM

## 2024-03-13 PROCEDURE — RECHECK

## 2024-03-13 NOTE — TELEPHONE ENCOUNTER
Patient called in to see if her appointment at 1:30 on 3/13/24 with [facility] could be a virtual due to not feeling well. Spoke with office and patients appointment was changed to a virtual. Advised patient she will receive an email with a link.

## 2024-04-01 ENCOUNTER — CLINICAL SUPPORT (OUTPATIENT)
Dept: BARIATRICS | Facility: CLINIC | Age: 74
End: 2024-04-01

## 2024-04-01 VITALS — WEIGHT: 176.8 LBS | HEIGHT: 65 IN | BODY MASS INDEX: 29.46 KG/M2

## 2024-04-01 DIAGNOSIS — R63.5 ABNORMAL WEIGHT GAIN: Primary | ICD-10-CM

## 2024-04-01 PROCEDURE — RECHECK

## 2024-04-25 ENCOUNTER — APPOINTMENT (OUTPATIENT)
Dept: RADIOLOGY | Facility: CLINIC | Age: 74
End: 2024-04-25
Payer: MEDICARE

## 2024-04-25 DIAGNOSIS — M16.9 HIP ARTHROSIS: ICD-10-CM

## 2024-04-25 PROCEDURE — 73502 X-RAY EXAM HIP UNI 2-3 VIEWS: CPT

## 2024-04-30 NOTE — PROGRESS NOTES
"Weight Management Medical Nutrition Assessment  Is here for [facility] f/u in lieu of class.  Current wt: 174.3  lbs. She has lost  30.3 lbs since July 2019. Still having a lot of pain and may need a hip replacement. Finds she still does not have much of an appetite since her  has been unable to eat. Does appear to be getting enough protein. Water intake remains good. She has no concerns at this time. She will call if she desires a f/u at this time.     Dislikes:      Anthropometric Measurements  Start Weight (lbs): 204.6 lbs 7/11/19  Current wt:  174.3 lbs  %TBW loss: 13.5%   Ideal Body Weight (lbs): 150.1 lbs BMI 25 (65\" 125 lbs)  Goal Weight (lbs): 160 lbs (last weighed this a long time ago)  Highest: 208 lbs  Lowest:  135 lbs  UBW: 160 lbs     Weight Loss History  Previous weight loss attempts: Commercial Programs (Weight Watchers, [employer], etc.)  Exercise  Self Created Diets (Portion Control, Healthy Food Choices, etc.)     Food and Nutrition Related History  Wake up: 10:00     Bed Time: 12:00     Food Recall  Breakfast: 10:30: protein bar   Snack: skip   Lunch: 2:30-3:00: healthy choice power bowls, diet coke OR coleslaw mix, pistachios, cheese, turkey, poppyseed dressing OR salad w/shrimp or chicken  Snack: skip  Dinner: 7 p.m. healthy choice meal OR eggs, veggies, yogurt dip OR shrimp, pasta, sauce OR  chicken, couscous/rice/pasta, veggies  Snack: skip  OR 1/2 cup ice cream 1x/wk      Beverages: diet coke, coffee, water   Volume of beverage intake:  96 oz water, 1 cup coffee w/h/h, few diet coke/day     Weekends: Same  Cravings: sweets  Trouble area of day: denies     Frequency of Eating out: every few weeks  Food restrictions: n/a  Cooking: self  Food Shopping: self     Physical Activity Intake  Activity: none currently  Frequency: n/a  Physical limitations/barriers to exercise:  back pain>>>better     Estimated Needs  Energy  Mando Mckeon [employer] Needs: BMR 1296   1# loss weekly sedentary:   " 1056            1# loss weekly lightly active:  1283  Protein:68-85 gm      (1.2-1.5g/kg IBW)  Fluid: 66 oz     (35mL/kg IBW)     Nutrition Diagnosis  No;    Current BMI is WNL for age     Nutrition Intervention  Nutrition Prescription  Calories: 0459-8193  Protein: >65 gm     Meal Plan  Breakfast: 200-275, 12-20  Snack: skip  Lunch: 400, 15-25  Snack 160, 15  Dinner: 350-400, 25  Snack: skip     Nutrition Education:    Healthy Core Manual  Calorie controlled menu  Lean protein food choices  Healthy snack options  Food journaling tips  Dining out     Nutrition Counseling:  Strategies: meal planning, portion sizes, healthy snack choices, hydration, fiber intake, protein intake, exercise, food journal        Monitoring and Evaluation:  Evaluation criteria:  Energy Intake  Meet protein needs  Maintain adequate hydration  Monitor weekly weight  Meal planning/preparation  Food journal   Decreased portions at mealtimes and snacks  Physical activity      Barriers to learning:none  Readiness to change:  action  Comprehension: very good  Expected Compliance: very good

## 2024-05-01 ENCOUNTER — CLINICAL SUPPORT (OUTPATIENT)
Dept: BARIATRICS | Facility: CLINIC | Age: 74
End: 2024-05-01

## 2024-05-01 VITALS — BODY MASS INDEX: 29.04 KG/M2 | HEIGHT: 65 IN | WEIGHT: 174.3 LBS

## 2024-05-01 DIAGNOSIS — R63.5 ABNORMAL WEIGHT GAIN: Primary | ICD-10-CM

## 2024-05-01 PROCEDURE — RECHECK

## 2024-05-09 ENCOUNTER — TELEPHONE (OUTPATIENT)
Dept: HEMATOLOGY ONCOLOGY | Facility: CLINIC | Age: 74
End: 2024-05-09

## 2024-05-09 ENCOUNTER — TELEPHONE (OUTPATIENT)
Dept: SURGICAL ONCOLOGY | Facility: CLINIC | Age: 74
End: 2024-05-09

## 2024-05-09 DIAGNOSIS — N60.91 ATYPICAL DUCTAL HYPERPLASIA OF RIGHT BREAST: ICD-10-CM

## 2024-05-09 DIAGNOSIS — D24.1 INTRADUCTAL PAPILLOMA OF BREAST, RIGHT: ICD-10-CM

## 2024-05-09 DIAGNOSIS — Z00.6 ENCOUNTER FOR EXAMINATION FOR NORMAL COMPARISON OR CONTROL IN CLINICAL RESEARCH PROGRAM: ICD-10-CM

## 2024-05-09 DIAGNOSIS — N64.52 BLOODY DISCHARGE FROM LEFT NIPPLE: Primary | ICD-10-CM

## 2024-05-09 NOTE — TELEPHONE ENCOUNTER
Call Transfer   Who are you speaking with?  Patient   If it is not the patient, are they listed on an active communication consent form? Yes   Who is the patients HemOnc/SurgOnc provider? Dr. Rendon   What is the reason for this call? Need updated orders for mammogram   Person/Department that the call was transferred to?    Time that call was transferred?   Tiff B 1:18pm 5/9   Your call will be transferred now. If you receive a voicemail, please leave a detailed message and a member of the team will return your call as soon as possible.    Did you relay this information to the caller?  Yes

## 2024-05-09 NOTE — TELEPHONE ENCOUNTER
Mercedes, this is Jeanne Lyon Patient Doctor Lilian 1950. I can't believe I waited this long, but I have a order for a mammogram dated January 10th, 2023. Wondering if you could put one in for this year. I would greatly appreciate it. It's a mammoth screening bilateral with 3D and CAD and I don't know why. I think I went to [place] last time. Anyway, thank you then [phone]. Thank you      Received this voicemail. Called Dr. Rendon to review. Ok to order imaging but patient needs to be seen by a provider. Called patient and explained. Patient agreeable to see NP in [place]. Ordered imaging. Patient to call central scheduling to schedule imaging. Gave patient telephone number. Made appointment for NP 1 month from now. Advised patient if imaging isnt done prior to NP appointment to call and reschedule. The imaging will need to be completed to have appointment. Patient stated understanding. All questions answered at this time.

## 2024-05-16 ENCOUNTER — EVALUATION (OUTPATIENT)
Dept: PHYSICAL THERAPY | Facility: REHABILITATION | Age: 74
End: 2024-05-16
Payer: MEDICARE

## 2024-05-16 ENCOUNTER — TRANSCRIBE ORDERS (OUTPATIENT)
Dept: PHYSICAL THERAPY | Facility: REHABILITATION | Age: 74
End: 2024-05-16

## 2024-05-16 DIAGNOSIS — Z01.818 OTHER SPECIFIED PRE-OPERATIVE EXAMINATION: ICD-10-CM

## 2024-05-16 DIAGNOSIS — M16.11 UNILATERAL PRIMARY OSTEOARTHRITIS, RIGHT HIP: Primary | ICD-10-CM

## 2024-05-16 DIAGNOSIS — Z01.812 PRE-OPERATIVE LABORATORY EXAMINATION: ICD-10-CM

## 2024-05-16 PROCEDURE — 97162 PT EVAL MOD COMPLEX 30 MIN: CPT

## 2024-05-16 PROCEDURE — 97110 THERAPEUTIC EXERCISES: CPT

## 2024-05-16 NOTE — PROGRESS NOTES
PT Evaluation     Today's date: 2024  Patient name: Jeanne Lyon  : 1950  MRN: [id number]  Referring provider: Mana Taylor DO  Dx:   Encounter Diagnosis     ICD-10-CM    1. Unilateral primary osteoarthritis, right hip  M16.11           Start Time: 915  Stop Time: 1000  Total time in clinic (min): 45 minutes    Assessment  Impairments: abnormal gait, abnormal muscle tone, abnormal or restricted ROM, activity intolerance, impaired balance, impaired physical strength, lacks appropriate home exercise program, pain with function and weight-bearing intolerance  Symptom irritability: moderate    Assessment details:   Jeanne Lyon is a pleasant 74 y.o. female who presents with chronic right hip pain. Patient is scheduled to undergo L TOMMY 24 (anterior approach). No significant ROM deficits noted today. Mild hip strengthens deficits. Provided and reviewed HEP. Plan to see patient for 2 weeks prior to her surgery.     Understanding of Dx/Px/POC: good     Prognosis: good    Goals  Short Term/Pre-Operative Goals (Week 4):  1. Decreased pain by 50%  2. Improve ROM by 10 degrees   3. Improve strength by 1/2 measure    Plan  Patient would benefit from: skilled physical therapy    Planned therapy interventions: graded exercise, functional ROM exercises, flexibility, gait training, graded activity, home exercise program, therapeutic training, therapeutic exercise, therapeutic activities, stretching, strengthening, patient education, neuromuscular re-education, nerve gliding, behavior modification, balance, activity modification, manual therapy, IASTM and joint mobilization    Treatment plan discussed with: patient  Plan details: Plan to see patient 2x/week for the next 1-2 weeks.        Subjective Evaluation    History of Present Illness  Mechanism of injury: Patient reports chronic right hip pain. Patient initially thought the pain was from her back and was treated by PT. Patient was seen by [facility]  due to continued pain. X-rays revealed significant OA and was recommended for surgery. Patient reports her symptoms are localized to the right buttock. No numbness/tingling. Slight weakness in the legs from pain. Pain is aggravated by weight-bearing. Pain is improved with NSAIDs and ice.   Patient Goals  Patient goals for therapy: decreased pain, increased motion, increased strength, independence with ADLs/IADLs and return to sport/leisure activities    Pain  Current pain ratin  At worst pain ratin  Location: Right buttock  Quality: dull ache      Diagnostic Tests  X-ray: abnormal  Treatments  Previous treatment: physical therapy        Objective     Neurological Testing     Sensation     Hip   Left Hip   Intact: light touch    Right Hip   Intact: light touch    Passive Range of Motion   Left Hip   Normal passive range of motion    Right Hip   Flexion: 110 degrees   Extension: 0 degrees   Abduction: 45 degrees   External rotation (90/90): 45 degrees   External rotation (prone): 25 degrees   Internal rotation (90/90): 15 degrees   Internal rotation (prone): 15 degrees     Strength/Myotome Testing     Left Hip   Planes of Motion   Flexion: 4+  Abduction: 4  External rotation: 4+  Internal rotation: 4+    Right Hip   Planes of Motion   Flexion: 4+  Extension: 4  Abduction: 4  External rotation: 4  Internal rotation: 4    Tests     Right Hip   Positive Ely's, CESAR and FADIR.   90/90 SLR: Negative.  SLR: Negative.              Precautions: L TKA       Manuals                                                                 Neuro Re-Ed                                                                                                        Ther Ex             Supine Clamshell w/ Band HEP            Bridge w/ Hip Abd Iso HEP            L Sidelying Hip Abd HEP            Resisted Side Stepping HEP                                                                Ther Activity                                       Gait  Training                                       Modalities

## 2024-05-16 NOTE — LETTER
May 16, 2024    Mana Taylor DO  [address] [facility] [address]    Patient: Jeanne Lyon   YOB: 1950   Date of Visit: 2024     Encounter Diagnosis     ICD-10-CM    1. Unilateral primary osteoarthritis, right hip  M16.11           Dear Dr. Taylor:    Thank you for your recent referral of Jeanne Lyon. Please review the attached evaluation summary from Jeanne's recent visit.     Please verify that you agree with the plan of care by signing the attached order.     If you have any questions or concerns, please do not hesitate to call.     I sincerely appreciate the opportunity to share in the care of one of your patients and hope to have another opportunity to work with you in the near future.       Sincerely,    Brett García, PT      Referring Provider:      I certify that I have read the below Plan of Care and certify the need for these services furnished under this plan of treatment while under my care.                    Mana Taylor DO  [address] [facility] [address]  Via Fax: [phone]          PT Evaluation     Today's date: 2024  Patient name: Jeanne Lyon  : 1950  MRN: [id number]  Referring provider: Mana Taylor DO  Dx:   Encounter Diagnosis     ICD-10-CM    1. Unilateral primary osteoarthritis, right hip  M16.11           Start Time: 0915  Stop Time: 1000  Total time in clinic (min): 45 minutes    Assessment  Impairments: abnormal gait, abnormal muscle tone, abnormal or restricted ROM, activity intolerance, impaired balance, impaired physical strength, lacks appropriate home exercise program, pain with function and weight-bearing intolerance  Symptom irritability: moderate    Assessment details:   Jeanne Lyon is a pleasant 74 y.o. female who presents with chronic right hip pain. Patient is scheduled to undergo L TOMMY 24 (anterior approach). No significant ROM deficits noted today. Mild hip strengthens deficits.  Provided and reviewed HEP. Plan to see patient for 2 weeks prior to her surgery.     Understanding of Dx/Px/POC: good     Prognosis: good    Goals  Short Term/Pre-Operative Goals (Week 4):  1. Decreased pain by 50%  2. Improve ROM by 10 degrees   3. Improve strength by 1/2 measure    Plan  Patient would benefit from: skilled physical therapy    Planned therapy interventions: graded exercise, functional ROM exercises, flexibility, gait training, graded activity, home exercise program, therapeutic training, therapeutic exercise, therapeutic activities, stretching, strengthening, patient education, neuromuscular re-education, nerve gliding, behavior modification, balance, activity modification, manual therapy, IASTM and joint mobilization    Treatment plan discussed with: patient  Plan details: Plan to see patient 2x/week for the next 1-2 weeks.        Subjective Evaluation    History of Present Illness  Mechanism of injury: Patient reports chronic right hip pain. Patient initially thought the pain was from her back and was treated by PT. Patient was seen by [facility] due to continued pain. X-rays revealed significant OA and was recommended for surgery. Patient reports her symptoms are localized to the right buttock. No numbness/tingling. Slight weakness in the legs from pain. Pain is aggravated by weight-bearing. Pain is improved with NSAIDs and ice.   Patient Goals  Patient goals for therapy: decreased pain, increased motion, increased strength, independence with ADLs/IADLs and return to sport/leisure activities    Pain  Current pain ratin  At worst pain ratin  Location: Right buttock  Quality: dull ache      Diagnostic Tests  X-ray: abnormal  Treatments  Previous treatment: physical therapy        Objective     Neurological Testing     Sensation     Hip   Left Hip   Intact: light touch    Right Hip   Intact: light touch    Passive Range of Motion   Left Hip   Normal passive range of motion    Right Hip   Flexion:  110 degrees   Extension: 0 degrees   Abduction: 45 degrees   External rotation (90/90): 45 degrees   External rotation (prone): 25 degrees   Internal rotation (90/90): 15 degrees   Internal rotation (prone): 15 degrees     Strength/Myotome Testing     Left Hip   Planes of Motion   Flexion: 4+  Abduction: 4  External rotation: 4+  Internal rotation: 4+    Right Hip   Planes of Motion   Flexion: 4+  Extension: 4  Abduction: 4  External rotation: 4  Internal rotation: 4    Tests     Right Hip   Positive Ely's, CESAR and CARMELIR.   90/90 SLR: Negative.  SLR: Negative.              Precautions: L TKA       Manuals 5/16                                                                Neuro Re-Ed                                                                                                        Ther Ex             Supine Clamshell w/ Band HEP            Bridge w/ Hip Abd Iso HEP            L Sidelying Hip Abd HEP            Resisted Side Stepping HEP                                                                Ther Activity                                       Gait Training                                       Modalities

## 2024-05-21 ENCOUNTER — APPOINTMENT (OUTPATIENT)
Dept: LAB | Facility: CLINIC | Age: 74
End: 2024-05-21
Payer: MEDICARE

## 2024-05-21 DIAGNOSIS — Z01.812 PRE-OPERATIVE LABORATORY EXAMINATION: ICD-10-CM

## 2024-05-21 DIAGNOSIS — Z01.818 OTHER SPECIFIED PRE-OPERATIVE EXAMINATION: ICD-10-CM

## 2024-05-21 DIAGNOSIS — M16.11 UNILATERAL PRIMARY OSTEOARTHRITIS, RIGHT HIP: ICD-10-CM

## 2024-05-21 DIAGNOSIS — Z00.6 ENCOUNTER FOR EXAMINATION FOR NORMAL COMPARISON OR CONTROL IN CLINICAL RESEARCH PROGRAM: ICD-10-CM

## 2024-05-21 LAB
ALBUMIN SERPL BCP-MCNC: 4.2 G/DL (ref 3.5–5)
ALP SERPL-CCNC: 125 U/L (ref 34–104)
ALT SERPL W P-5'-P-CCNC: 9 U/L (ref 7–52)
ANION GAP SERPL CALCULATED.3IONS-SCNC: 8 MMOL/L (ref 4–13)
AST SERPL W P-5'-P-CCNC: 13 U/L (ref 13–39)
BASOPHILS # BLD AUTO: 0.03 THOUSANDS/ÂΜL (ref 0–0.1)
BASOPHILS NFR BLD AUTO: 0 % (ref 0–1)
BILIRUB SERPL-MCNC: 0.51 MG/DL (ref 0.2–1)
BUN SERPL-MCNC: 13 MG/DL (ref 5–25)
CALCIUM SERPL-MCNC: 9.2 MG/DL (ref 8.4–10.2)
CHLORIDE SERPL-SCNC: 110 MMOL/L (ref 96–108)
CO2 SERPL-SCNC: 22 MMOL/L (ref 21–32)
CREAT SERPL-MCNC: 0.82 MG/DL (ref 0.6–1.3)
EOSINOPHIL # BLD AUTO: 0.47 THOUSAND/ÂΜL (ref 0–0.61)
EOSINOPHIL NFR BLD AUTO: 5 % (ref 0–6)
ERYTHROCYTE [DISTWIDTH] IN BLOOD BY AUTOMATED COUNT: 14.4 % (ref 11.6–15.1)
GFR SERPL CREATININE-BSD FRML MDRD: 71 ML/MIN/1.73SQ M
GLUCOSE P FAST SERPL-MCNC: 120 MG/DL (ref 65–99)
HCT VFR BLD AUTO: 41.6 % (ref 34.8–46.1)
HGB BLD-MCNC: 13.3 G/DL (ref 11.5–15.4)
IMM GRANULOCYTES # BLD AUTO: 0.02 THOUSAND/UL (ref 0–0.2)
IMM GRANULOCYTES NFR BLD AUTO: 0 % (ref 0–2)
LYMPHOCYTES # BLD AUTO: 3.72 THOUSANDS/ÂΜL (ref 0.6–4.47)
LYMPHOCYTES NFR BLD AUTO: 42 % (ref 14–44)
MCH RBC QN AUTO: 27.8 PG (ref 26.8–34.3)
MCHC RBC AUTO-ENTMCNC: 32 G/DL (ref 31.4–37.4)
MCV RBC AUTO: 87 FL (ref 82–98)
MONOCYTES # BLD AUTO: 0.59 THOUSAND/ÂΜL (ref 0.17–1.22)
MONOCYTES NFR BLD AUTO: 7 % (ref 4–12)
NEUTROPHILS # BLD AUTO: 4.04 THOUSANDS/ÂΜL (ref 1.85–7.62)
NEUTS SEG NFR BLD AUTO: 46 % (ref 43–75)
NRBC BLD AUTO-RTO: 0 /100 WBCS
PLATELET # BLD AUTO: 395 THOUSANDS/UL (ref 149–390)
PMV BLD AUTO: 9.4 FL (ref 8.9–12.7)
POTASSIUM SERPL-SCNC: 4.2 MMOL/L (ref 3.5–5.3)
PROT SERPL-MCNC: 7 G/DL (ref 6.4–8.4)
RBC # BLD AUTO: 4.78 MILLION/UL (ref 3.81–5.12)
SODIUM SERPL-SCNC: 140 MMOL/L (ref 135–147)
WBC # BLD AUTO: 8.87 THOUSAND/UL (ref 4.31–10.16)

## 2024-05-21 PROCEDURE — 85025 COMPLETE CBC W/AUTO DIFF WBC: CPT

## 2024-05-21 PROCEDURE — 80053 COMPREHEN METABOLIC PANEL: CPT

## 2024-05-21 PROCEDURE — 36415 COLL VENOUS BLD VENIPUNCTURE: CPT

## 2024-05-22 ENCOUNTER — APPOINTMENT (OUTPATIENT)
Dept: LAB | Facility: CLINIC | Age: 74
End: 2024-05-22
Payer: MEDICARE

## 2024-05-22 LAB
BILIRUB UR QL STRIP: NEGATIVE
CLARITY UR: CLEAR
COLOR UR: NORMAL
GLUCOSE UR STRIP-MCNC: NEGATIVE MG/DL
HGB UR QL STRIP.AUTO: NEGATIVE
KETONES UR STRIP-MCNC: NEGATIVE MG/DL
LEUKOCYTE ESTERASE UR QL STRIP: NEGATIVE
NITRITE UR QL STRIP: NEGATIVE
PH UR STRIP.AUTO: 7 [PH]
PROT UR STRIP-MCNC: NEGATIVE MG/DL
SP GR UR STRIP.AUTO: 1.01 (ref 1–1.03)
UROBILINOGEN UR STRIP-ACNC: <2 MG/DL

## 2024-05-22 PROCEDURE — 81003 URINALYSIS AUTO W/O SCOPE: CPT

## 2024-05-29 ENCOUNTER — OFFICE VISIT (OUTPATIENT)
Dept: PHYSICAL THERAPY | Facility: REHABILITATION | Age: 74
End: 2024-05-29
Payer: MEDICARE

## 2024-05-29 DIAGNOSIS — M16.11 UNILATERAL PRIMARY OSTEOARTHRITIS, RIGHT HIP: Primary | ICD-10-CM

## 2024-05-29 PROCEDURE — 97110 THERAPEUTIC EXERCISES: CPT

## 2024-05-29 PROCEDURE — 97112 NEUROMUSCULAR REEDUCATION: CPT

## 2024-05-29 NOTE — PROGRESS NOTES
"Daily Note     Today's date: 2024  Patient name: Jeanne Lyon  : 1950  MRN: [id number]  Referring provider: Mana Taylor DO  Dx:   Encounter Diagnosis     ICD-10-CM    1. Unilateral primary osteoarthritis, right hip  M16.11           Start Time: 0845  Stop Time: 914  Total time in clinic (min): 29 minutes    Subjective: Patient reports her hip is hurting today and yesterday. States she not sure why      Objective: See treatment diary below      Assessment: Tolerated treatment well today. Muscular soreness end of session. Improved pain in right hip end of session. Will progress slowly given her upcoming surgery next week. Patient would benefit from continued PT      Plan: Continue per plan of care.      Precautions: L TKA       Manuals                                                                Neuro Re-Ed                                                                                                        Ther Ex             Supine Clamshell w/ Band HEP rhb 2x10 3\" hold           Bridge w/ Hip Abd Iso HEP Rhb 2x10           L Sidelying Hip Abd HEP 2x10           Resisted Side Stepping HEP 1 lap up/back x3 yb                                                  VG  L7 5' DL           Ther Activity                                       Gait Training                                       Modalities                                            "

## 2024-06-10 ENCOUNTER — OFFICE VISIT (OUTPATIENT)
Dept: PHYSICAL THERAPY | Facility: REHABILITATION | Age: 74
End: 2024-06-10
Payer: MEDICARE

## 2024-06-10 DIAGNOSIS — M16.11 UNILATERAL PRIMARY OSTEOARTHRITIS, RIGHT HIP: Primary | ICD-10-CM

## 2024-06-10 PROCEDURE — 97164 PT RE-EVAL EST PLAN CARE: CPT

## 2024-06-10 PROCEDURE — 97140 MANUAL THERAPY 1/> REGIONS: CPT

## 2024-06-10 PROCEDURE — 97110 THERAPEUTIC EXERCISES: CPT

## 2024-06-10 NOTE — LETTER
2024    Roman Riley MD  [address] [place] [address]    Patient: Jeanne Lyon   YOB: 1950   Date of Visit: 6/10/2024     Encounter Diagnosis     ICD-10-CM    1. Unilateral primary osteoarthritis, right hip  M16.11           Dear Dr. Riley:    Thank you for your recent referral of Jeanne Lyon. Please review the attached evaluation summary from Jeanne's recent visit.     Please verify that you agree with the plan of care by signing the attached order.     If you have any questions or concerns, please do not hesitate to call.     I sincerely appreciate the opportunity to share in the care of one of your patients and hope to have another opportunity to work with you in the near future.       Sincerely,    Brett García, PT      Referring Provider:      I certify that I have read the below Plan of Care and certify the need for these services furnished under this plan of treatment while under my care.                    Roman Riley MD  [address] [place] [address]  Via Fax: [phone]          PT Evaluation     Today's date: 6/10/2024  Patient name: Jeanne Lyon  : 1950  MRN: [id number]  Referring provider: Mana Taylor DO  Dx:   Encounter Diagnosis     ICD-10-CM    1. Unilateral primary osteoarthritis, right hip  M16.11           Start Time: 1830  Stop Time: 1900  Total time in clinic (min): 30 minutes    Assessment  Impairments: abnormal gait, abnormal muscle tone, abnormal or restricted ROM, activity intolerance, impaired balance, impaired physical strength, lacks appropriate home exercise program, pain with function and weight-bearing intolerance  Symptom irritability: moderate    Assessment details:   Jeanne Lyon is a pleasant 74 y.o. female who presents 5 days s/p R TOMMY 24. Patient is overall doing well. Strength, ROM, and gait deficits noted on exam today. No concerns for infection. Reviewed  signs/symptoms of DVT, patient verbalized understanding. The impairments listed are resulting in pain with ADLs/IADLs, reduced QoL, and reduced functional independence.    Understanding of Dx/Px/POC: good     Prognosis: good    Goals  Short Term Goals (Week 4):  1. Decreased pain by 50%  2. Improve ROM by 10 degrees   3. Improve strength by 1/2 measure      Long Term Goals (8 weeks):  1. GROC >75%  2. Patient will exceed FOTO predicted outcome score  3. Patient will be fully independent with HEP by discharge  4. Patient will be able to manage symptoms independently.       Plan  Patient would benefit from: skilled physical therapy    Planned therapy interventions: graded exercise, functional ROM exercises, flexibility, gait training, graded activity, home exercise program, therapeutic training, therapeutic exercise, therapeutic activities, stretching, strengthening, patient education, neuromuscular re-education, nerve gliding, behavior modification, balance, activity modification, manual therapy, IASTM and joint mobilization    Frequency: 2x week  Duration in weeks: 6  Treatment plan discussed with: patient        Subjective Evaluation    History of Present Illness  Date of surgery: 6/5/2024  Mechanism of injury: surgery  Mechanism of injury: Patient presents to PT 5 days s/p R TOMMY performed by Dr. Riley. Patient reports she is doing well. States her pain is well controlled with oxycodone. Patient repots no difficulties sleeping. Patient reports she has been wearing her compression socks daily. Patient reports she is up and moving frequently during the day due to difficulties holding her bladder. Patient reports denies any weakness and/or numbness/tingling. Patient denies any drainage, bloody discharge, smell, and/or oozing from the wound. Patient denies any calf pain, chest pain, SOB, fever/chills, and/or constitutional symptoms.   Patient Goals  Patient goals for therapy: decreased pain, improved balance, increased  "motion, independence with ADLs/IADLs, return to sport/leisure activities and increased strength    Pain  Current pain ratin  Location: lateral thigh  Quality: dull ache  Exacerbated by: initially standing up/starting to walk.    Social Support  Stairs in house: no   Lives in: one-story house  Lives with: spouse    Treatments  Previous treatment: physical therapy        Objective  Range of Motion  Right Hip:  Flexion 95  Abduction 30  ER 25  IR 10  Extension 0    Left Hip:  Flexion 110  Abduction 45  ER 50  IR 15  Extension 0      Myotomes/Strength Testing  Right Knee:   Ext (4+/5), Flex (4+/5)    Left Knee:  Ext (5/5), Flex (/55)    Rip Hip:   Ext (NT/5), Flex (<3/5), Abd (NT/5)    Left Hip:   Ext (NT/5), Flex (5/5), Abd (NT/5)      Dermatomes  Sensation Intact Bilaterally      Reflexes  R Patella  Reflex 2+  L Patellar Reflex 2+  R Achilles Reflex; 2+  L Achilles Reflex: 2+      Neurodynamic Testing  SLR (tightness)  Slump Test (-)   Femoral Nerve Tension Test (-)       Precautions: s/p R TOMMY 24 (anterior precautions), history of L TKA     Manuals 5/16 5/29 6/10                                                 Re-Eval   PRR          Neuro Re-Ed                                                                                                        Ther Ex             Supine Clamshell w/ Band HEP rhb 2x10 3\" hold           Bridge w/ Hip Abd Iso HEP Rhb 2x10           L Sidelying Hip Abd HEP 2x10           Resisted Side Stepping HEP 1 lap up/back x3 yb           Pt edu, HEP, POC   PRR                                    VG  L7 5' DL           Ther Activity                                       Gait Training                                       Modalities                                                            " Cosentyx Counseling:  I discussed with the patient the risks of Cosentyx including but not limited to worsening of Crohn's disease, immunosuppression, allergic reactions and infections.  The patient understands that monitoring is required including a PPD at baseline and must alert us or the primary physician if symptoms of infection or other concerning signs are noted.

## 2024-06-10 NOTE — PROGRESS NOTES
PT Evaluation     Today's date: 6/10/2024  Patient name: Jeanne Lyon  : 1950  MRN: [id number]  Referring provider: Mana Taylor DO  Dx:   Encounter Diagnosis     ICD-10-CM    1. Unilateral primary osteoarthritis, right hip  M16.11           Start Time: 1830  Stop Time: 1900  Total time in clinic (min): 30 minutes    Assessment  Impairments: abnormal gait, abnormal muscle tone, abnormal or restricted ROM, activity intolerance, impaired balance, impaired physical strength, lacks appropriate home exercise program, pain with function and weight-bearing intolerance  Symptom irritability: moderate    Assessment details:   Jeanne Lyon is a pleasant 74 y.o. female who presents 5 days s/p R TOMMY 24. Patient is overall doing well. Strength, ROM, and gait deficits noted on exam today. No concerns for infection. Reviewed signs/symptoms of DVT, patient verbalized understanding. The impairments listed are resulting in pain with ADLs/IADLs, reduced QoL, and reduced functional independence.    Understanding of Dx/Px/POC: good     Prognosis: good    Goals  Short Term Goals (Week 4):  1. Decreased pain by 50%  2. Improve ROM by 10 degrees   3. Improve strength by 1/2 measure      Long Term Goals (8 weeks):  1. GROC >75%  2. Patient will exceed FOTO predicted outcome score  3. Patient will be fully independent with HEP by discharge  4. Patient will be able to manage symptoms independently.       Plan  Patient would benefit from: skilled physical therapy    Planned therapy interventions: graded exercise, functional ROM exercises, flexibility, gait training, graded activity, home exercise program, therapeutic training, therapeutic exercise, therapeutic activities, stretching, strengthening, patient education, neuromuscular re-education, nerve gliding, behavior modification, balance, activity modification, manual therapy, IASTM and joint mobilization    Frequency: 2x week  Duration in weeks: 6  Treatment  plan discussed with: patient        Subjective Evaluation    History of Present Illness  Date of surgery: 2024  Mechanism of injury: surgery  Mechanism of injury: Patient presents to PT 5 days s/p R TOMMY performed by Dr. Riley. Patient reports she is doing well. States her pain is well controlled with oxycodone. Patient repots no difficulties sleeping. Patient reports she has been wearing her compression socks daily. Patient reports she is up and moving frequently during the day due to difficulties holding her bladder. Patient reports denies any weakness and/or numbness/tingling. Patient denies any drainage, bloody discharge, smell, and/or oozing from the wound. Patient denies any calf pain, chest pain, SOB, fever/chills, and/or constitutional symptoms.   Patient Goals  Patient goals for therapy: decreased pain, improved balance, increased motion, independence with ADLs/IADLs, return to sport/leisure activities and increased strength    Pain  Current pain ratin  Location: lateral thigh  Quality: dull ache  Exacerbated by: initially standing up/starting to walk.    Social Support  Stairs in house: no   Lives in: one-story house  Lives with: spouse    Treatments  Previous treatment: physical therapy        Objective  Range of Motion  Right Hip:  Flexion 95  Abduction 30  ER 25  IR 10  Extension 0    Left Hip:  Flexion 110  Abduction 45  ER 50  IR 15  Extension 0      Myotomes/Strength Testing  Right Knee:   Ext (4+/5), Flex (4+/5)    Left Knee:  Ext (5/5), Flex (/55)    Rip Hip:   Ext (NT/5), Flex (<3/5), Abd (NT/5)    Left Hip:   Ext (NT/5), Flex (5/5), Abd (NT/5)      Dermatomes  Sensation Intact Bilaterally      Reflexes  R Patella  Reflex 2+  L Patellar Reflex 2+  R Achilles Reflex; 2+  L Achilles Reflex: 2+      Neurodynamic Testing  SLR (tightness)  Slump Test (-)   Femoral Nerve Tension Test (-)       Precautions: s/p R TOMMY 24 (anterior precautions), history of L TKA     Manuals 5/16 5/29 6/10         "                                         Re-Eval   PRR          Neuro Re-Ed                                                                                                        Ther Ex             Supine Clamshell w/ Band HEP rhb 2x10 3\" hold           Bridge w/ Hip Abd Iso HEP Rhb 2x10           L Sidelying Hip Abd HEP 2x10           Resisted Side Stepping HEP 1 lap up/back x3 yb           Pt edu, HEP, POC   PRR                                    VG  L7 5' DL           Ther Activity                                       Gait Training                                       Modalities                                            "

## 2024-06-12 ENCOUNTER — OFFICE VISIT (OUTPATIENT)
Dept: PHYSICAL THERAPY | Facility: REHABILITATION | Age: 74
End: 2024-06-12
Payer: MEDICARE

## 2024-06-12 DIAGNOSIS — M16.11 UNILATERAL PRIMARY OSTEOARTHRITIS, RIGHT HIP: Primary | ICD-10-CM

## 2024-06-12 PROCEDURE — 97110 THERAPEUTIC EXERCISES: CPT

## 2024-06-12 PROCEDURE — 97112 NEUROMUSCULAR REEDUCATION: CPT

## 2024-06-12 PROCEDURE — 97530 THERAPEUTIC ACTIVITIES: CPT

## 2024-06-12 NOTE — PROGRESS NOTES
"Daily Note     Today's date: 2024  Patient name: Jeanne Lyon  : 1950  MRN: [id number]  Referring provider: Mana Taylor DO  Dx:   Encounter Diagnosis     ICD-10-CM    1. Unilateral primary osteoarthritis, right hip  M16.11           Start Time: 915  Stop Time: 1000  Total time in clinic (min): 45 minutes    Subjective: Patient reports she received tramadol instead of the oxycodone to help with your incontinence, which has been good. Reports the tramadol isn't as effective for her pain.       Objective: See treatment diary below      Assessment: Tolerated treatment well today. Patient overall doing very well. Unable to perform SLR today due to weakness. Patient would benefit from continued PT      Plan: Continue per plan of care.      Precautions: s/p R TOMMY 24 (anterior precautions), history of L TKA     Manuals 5/16 5/29 6/10 6/12                                                Re-Eval   PRR          Neuro Re-Ed                                                                                                        Ther Ex             Supine Clamshell w/ Band HEP rhb 2x10 3\" hold  Yhb 2x10 3\" hold         Bridge w/ Hip Abd Iso HEP Rhb 2x10  Yhb 2x10         L Sidelying Hip Abd HEP 2x10           Resisted Side Stepping HEP 1 lap up/back x3 yb           Pt edu, HEP, POC   PRR          Supine Marching    2x10          LAQ    1# 2x10         Minisquats    3x5 chair + foam use of HHA         Bike    5' L0         VG  L7 5' DL  L5 5' DL         Ther Activity                                       Gait Training                                       Modalities                                            "

## 2024-06-13 LAB
APOB+LDLR+PCSK9 GENE MUT ANL BLD/T: NOT DETECTED
BRCA1+BRCA2 DEL+DUP + FULL MUT ANL BLD/T: NOT DETECTED
MLH1+MSH2+MSH6+PMS2 GN DEL+DUP+FUL M: NOT DETECTED

## 2024-06-17 ENCOUNTER — OFFICE VISIT (OUTPATIENT)
Dept: PHYSICAL THERAPY | Facility: REHABILITATION | Age: 74
End: 2024-06-17
Payer: MEDICARE

## 2024-06-17 DIAGNOSIS — M16.11 UNILATERAL PRIMARY OSTEOARTHRITIS, RIGHT HIP: Primary | ICD-10-CM

## 2024-06-17 PROCEDURE — 97110 THERAPEUTIC EXERCISES: CPT | Performed by: PHYSICAL MEDICINE & REHABILITATION

## 2024-06-17 PROCEDURE — 97112 NEUROMUSCULAR REEDUCATION: CPT | Performed by: PHYSICAL MEDICINE & REHABILITATION

## 2024-06-17 NOTE — PROGRESS NOTES
"Daily Note     Today's date: 2024  Patient name: Jeanne Lyon  : 1950  MRN: [id number]  Referring provider: Mana Taylor DO  Dx:   Encounter Diagnosis     ICD-10-CM    1. Unilateral primary osteoarthritis, right hip  M16.11                      Subjective: Patient reports cramp through R glute since this morning.     Objective: See treatment diary below    Assessment: Tolerated treatment well today. Included R hip PROM secondary to pt symptom report. Improved discomfort end of session. Instructed self-stretch for posterior chain for home use. Patient would benefit from continued PT to address remaining deficits. Continue as able nv per patient tolerance.      Plan: Continue per plan of care.      Precautions: s/p R TOMMY 24 (anterior precautions), history of L TKA     Manuals 5/16 5/29 6/10 6/12 6/17                                       R hip PROM, LH        Re-Eval   PRR          Neuro Re-Ed                                                                                                        Ther Ex             Supine Clamshell w/ Band HEP rhb 2x10 3\" hold  Yhb 2x10 3\" hold YHB 2x10x3\"        Bridge w/ Hip Abd Iso HEP Rhb 2x10  Yhb 2x10 YHB 2x10        L Sidelying Hip Abd HEP 2x10           Resisted Side Stepping HEP 1 lap up/back x3 yb           Pt edu, HEP, POC   PRR          Supine Marching    2x10  2x10        LAQ    1# 2x10 1# 3x10        Minisquats    3x5 chair + foam use of HHA 3x5 w/ 1 foam        Bike    5' L0 5'        VG  L7 5' DL  L5 5' DL L5 5' DL        Ther Activity                  Standing HS stretch w/ stool 10x5\"                     Gait Training                                       Modalities                                            "

## 2024-06-19 ENCOUNTER — HOSPITAL ENCOUNTER (OUTPATIENT)
Dept: ULTRASOUND IMAGING | Facility: CLINIC | Age: 74
Discharge: HOME | End: 2024-06-19
Payer: MEDICARE

## 2024-06-19 ENCOUNTER — HOSPITAL ENCOUNTER (OUTPATIENT)
Dept: MAMMOGRAPHY | Facility: CLINIC | Age: 74
Discharge: HOME | End: 2024-06-19
Payer: MEDICARE

## 2024-06-19 VITALS — BODY MASS INDEX: 28.99 KG/M2 | HEIGHT: 65 IN | WEIGHT: 174 LBS

## 2024-06-19 DIAGNOSIS — N60.91 ATYPICAL DUCTAL HYPERPLASIA OF RIGHT BREAST: ICD-10-CM

## 2024-06-19 DIAGNOSIS — D24.1 INTRADUCTAL PAPILLOMA OF BREAST, RIGHT: ICD-10-CM

## 2024-06-19 DIAGNOSIS — N64.52 BLOODY DISCHARGE FROM LEFT NIPPLE: ICD-10-CM

## 2024-06-19 PROCEDURE — 76642 ULTRASOUND BREAST LIMITED: CPT

## 2024-06-19 PROCEDURE — 77066 DX MAMMO INCL CAD BI: CPT

## 2024-06-19 PROCEDURE — G0279 TOMOSYNTHESIS, MAMMO: HCPCS

## 2024-06-20 ENCOUNTER — APPOINTMENT (OUTPATIENT)
Dept: PHYSICAL THERAPY | Facility: REHABILITATION | Age: 74
End: 2024-06-20
Payer: MEDICARE

## 2024-06-24 ENCOUNTER — OFFICE VISIT (OUTPATIENT)
Dept: PHYSICAL THERAPY | Facility: REHABILITATION | Age: 74
End: 2024-06-24
Payer: MEDICARE

## 2024-06-24 DIAGNOSIS — M16.11 UNILATERAL PRIMARY OSTEOARTHRITIS, RIGHT HIP: Primary | ICD-10-CM

## 2024-06-24 PROCEDURE — 97140 MANUAL THERAPY 1/> REGIONS: CPT

## 2024-06-24 PROCEDURE — 97110 THERAPEUTIC EXERCISES: CPT

## 2024-06-24 PROCEDURE — 97112 NEUROMUSCULAR REEDUCATION: CPT

## 2024-06-24 NOTE — PROGRESS NOTES
"Daily Note     Today's date: 2024  Patient name: Jeanne Lyon  : 1950  MRN: [id number]  Referring provider: Mana Taylor DO  Dx:   Encounter Diagnosis     ICD-10-CM    1. Unilateral primary osteoarthritis, right hip  M16.11           Start Time: 1618  Stop Time: 1700  Total time in clinic (min): 42 minutes    Subjective: Patient reports that she feels good today with pain graded at 4/10 pre-treatment. She reports that she has muscle soreness when ambulating with the SPC, so has been sticking to the RW. No adverse reactions to previous session reported.       Objective: See treatment diary below      Assessment: Added standing march, supine SLR flexion and increased reps where indicated below. Patient was challenged by program but able to complete. She appeared to tolerate treatment well and with appropriate muscular challenge and fatigue. Encouraged patient to ambulate around her house with SPC for increased confidence and strength. She expressed understanding. Patient demonstrated fatigue post treatment, exhibited good technique with therapeutic exercises, and would benefit from continued PT      Plan: Continue per plan of care.  Progress treatment as tolerated.       Precautions: s/p R TOMMY 24 (anterior precautions), history of L TKA     Manuals 5/16 5/29 6/10 6/12 6/17 6/24                                      R hip PROM, LH R hip PROM JL       Re-Eval   PRR          Neuro Re-Ed                                                                                                       Ther Ex            Supine Clamshell w/ Band HEP rhb 2x10 3\" hold  Yhb 2x10 3\" hold YHB 2x10x3\" YHB 5\" 3x10       Bridge w/ Hip Abd Iso HEP Rhb 2x10  Yhb 2x10 YHB 2x10 YHB 3x10       L Sidelying Hip Abd HEP 2x10           Resisted Side Stepping HEP 1 lap up/back x3 yb    At mirror 4 laps       Pt edu, HEP, POC   PRR          Supine Marching    2x10  2x10 Stand (ALT) 2x10 2 HHA       LAQ    1# " "2x10 1# 3x10 1# 5\" 3x10       Minisquats    3x5 chair + foam use of HHA 3x5 w/ 1 foam 1x10, 1x5 chair+1 foam, NO HHA       Bike    5' L0 5' 7 min       VG  L7 5' DL  L5 5' DL L5 5' DL L6 5 min DL       SLR Flexion       2x5 AROM with lite A       Ther Activity                  Standing HS stretch w/ stool 10x5\"                     Gait Training                                       Modalities                                              "

## 2024-06-25 ENCOUNTER — OFFICE VISIT (OUTPATIENT)
Dept: SURGICAL ONCOLOGY | Facility: CLINIC | Age: 74
End: 2024-06-25
Payer: MEDICARE

## 2024-06-25 ENCOUNTER — TELEPHONE (OUTPATIENT)
Dept: HEMATOLOGY ONCOLOGY | Facility: CLINIC | Age: 74
End: 2024-06-25

## 2024-06-25 VITALS
HEIGHT: 65 IN | SYSTOLIC BLOOD PRESSURE: 120 MMHG | TEMPERATURE: 97.3 F | WEIGHT: 173.6 LBS | OXYGEN SATURATION: 96 % | BODY MASS INDEX: 28.92 KG/M2 | DIASTOLIC BLOOD PRESSURE: 76 MMHG | HEART RATE: 105 BPM

## 2024-06-25 DIAGNOSIS — N60.91 ATYPICAL DUCTAL HYPERPLASIA OF RIGHT BREAST: ICD-10-CM

## 2024-06-25 DIAGNOSIS — N63.25 MASS OVERLAPPING MULTIPLE QUADRANTS OF LEFT BREAST: Primary | ICD-10-CM

## 2024-06-25 PROBLEM — D24.1 INTRADUCTAL PAPILLOMA OF BREAST, RIGHT: Status: RESOLVED | Noted: 2021-08-12 | Resolved: 2024-06-25

## 2024-06-25 PROBLEM — N64.52 BLOODY DISCHARGE FROM LEFT NIPPLE: Status: RESOLVED | Noted: 2021-11-11 | Resolved: 2024-06-25

## 2024-06-25 PROCEDURE — 99213 OFFICE O/P EST LOW 20 MIN: CPT

## 2024-06-25 PROCEDURE — G2211 COMPLEX E/M VISIT ADD ON: HCPCS

## 2024-06-25 RX ORDER — AMLODIPINE BESYLATE 5 MG/1
5 TABLET ORAL DAILY
COMMUNITY
Start: 2024-05-28

## 2024-06-25 RX ORDER — GABAPENTIN 300 MG/1
300 CAPSULE ORAL
COMMUNITY

## 2024-06-25 RX ORDER — EZETIMIBE 10 MG/1
10 TABLET ORAL DAILY
COMMUNITY
Start: 2024-05-21

## 2024-06-25 RX ORDER — TRAMADOL HYDROCHLORIDE 50 MG/1
50 TABLET ORAL EVERY 6 HOURS PRN
COMMUNITY
Start: 2024-06-11

## 2024-06-25 RX ORDER — DULOXETIN HYDROCHLORIDE 20 MG/1
CAPSULE, DELAYED RELEASE ORAL
COMMUNITY
Start: 2023-09-01

## 2024-06-25 RX ORDER — AMOXICILLIN 250 MG
1 CAPSULE ORAL 2 TIMES DAILY
COMMUNITY
Start: 2024-06-06 | End: 2025-06-06

## 2024-06-25 RX ORDER — OXYCODONE HYDROCHLORIDE 5 MG/1
5 TABLET ORAL
COMMUNITY
Start: 2024-06-04

## 2024-06-25 RX ORDER — CEPHALEXIN 500 MG/1
CAPSULE ORAL
COMMUNITY
Start: 2024-06-24

## 2024-06-25 NOTE — ASSESSMENT & PLAN NOTE
Recent US shows decrease in size of mass at 12:00 position in left breast.  Will order repeat diagnostic imaging in 1 year, and I will see her again at that time for an exam.  Assuming that is stable, I will plan to discharge her back to her PCP for further breast screening.

## 2024-06-25 NOTE — PROGRESS NOTES
Surgical Oncology Follow Up       [facility] [place]  [address] [facility] [address]  [phone]    Jeanne Lyon  1950  [id number]  [facility] [place]  [address] [facility] [address]  [phone]    1. Mass overlapping multiple quadrants of left breast  Assessment & Plan:  Recent US shows decrease in size of mass at 12:00 position in left breast.  Will order repeat diagnostic imaging in 1 year, and I will see her again at that time for an exam.  Assuming that is stable, I will plan to discharge her back to her PCP for further breast screening.  Orders:  -     Mammo diagnostic bilateral w 3d & cad; Future; Expected date: 06/20/2025  -     US breast left limited (diagnostic); Future; Expected date: 06/20/2025  2. Atypical ductal hyperplasia of right breast  -     Mammo diagnostic bilateral w 3d & cad; Future; Expected date: 06/20/2025         Chief Complaint   Patient presents with    Follow-up       Return in about 1 year (around 6/25/2025) for Office Visit, Imaging - See orders.      History of Present Illness: The patient returns to the office today in follow-up for breast management.  She was last seen approximately 2 1/2 years ago following excision of a right breast intraductal papilloma with an incidental small focus of ADH far from the margin.  She denies any breast lumps, masses, skin changes or pain.  She has not experienced any nipple discharge whatsoever.  Mammogram and US were performed on June 19.  I have reviewed these results with the patient today.      Review of Systems   Constitutional:  Negative for activity change, appetite change and fatigue.   HENT: Negative.     Respiratory: Negative.     Cardiovascular: Negative.    Gastrointestinal: Negative.    Musculoskeletal:  Positive for arthralgias.   Skin: Negative.  Negative for  color change and wound.   Neurological: Negative.    Hematological: Negative.  Negative for adenopathy.   Psychiatric/Behavioral: Negative.               Patient Active Problem List   Diagnosis    Sleep apnea treated with nocturnal BiPAP    Complex sleep apnea syndrome    Obesity, Class I, BMI 30-34.9    Abnormal weight gain    Breast lump or mass    Anxiety    Depression    Primary osteoarthritis of left knee    GERD (gastroesophageal reflux disease)    ANA (obstructive sleep apnea)    Acute sialoadenitis    Hypokalemia    Elevated blood pressure reading    Atypical ductal hyperplasia of right breast     Past Medical History:   Diagnosis Date    Anxiety     BiPAP (biphasic positive airway pressure) dependence     Breast cyst     Left    Chronic kidney disease     Colon polyp     CPAP (continuous positive airway pressure) dependence     Depression     Elevated liver enzymes     GERD (gastroesophageal reflux disease)     Hepatitis B 1974    Hyperlipidemia     Infectious viral hepatitis     Hep B 1974    Sleep apnea     Uses bipap     Past Surgical History:   Procedure Laterality Date    BREAST BIOPSY Right 08/06/2021    intraductal papiloma    BREAST LUMPECTOMY Right 09/21/2021    Procedure: HALEY  DIRECTED LUMPECTOMY;  Surgeon: Andreina Rendon MD;  Location: [facility];  Service: Surgical Oncology    CATARACT EXTRACTION      COLONOSCOPY      HAND LIGAMENT RECONSTRUCTION Right     JOINT REPLACEMENT      MRI BREAST BIOPSY RIGHT (ALL INCLUSIVE) Right 08/06/2021    KS ARTHRP KNE CONDYLE&PLATU MEDIAL&LAT COMPARTMENTS Left 09/21/2022    Procedure: TOTAL KNEE REPLACEMENT;  Surgeon: Roman Riley MD;  Location: [facility];  Service: Orthopedics    US BREAST CLIP NEEDLE LOC RIGHT Right 09/13/2021     Family History   Problem Relation Age of Onset    Colon cancer Mother 46    Emphysema Father     Breast cancer Sister 65    No Known Problems Sister     No Known Problems Brother     No Known Problems Brother     No Known  Problems Brother     No Known Problems Brother     No Known Problems Maternal Grandmother     No Known Problems Maternal Grandfather     No Known Problems Paternal Grandmother     No Known Problems Paternal Grandfather     No Known Problems Maternal Aunt     No Known Problems Maternal Aunt     No Known Problems Paternal Aunt     No Known Problems Paternal Aunt     Breast cancer Niece      Social History     Socioeconomic History    Marital status: /Civil Union     Spouse name: Not on file    Number of children: Not on file    Years of education: Not on file    Highest education level: Not on file   Occupational History    Not on file   Tobacco Use    Smoking status: Former     Current packs/day: 0.00     Types: Cigarettes     Quit date:      Years since quittin.5    Smokeless tobacco: Former   Vaping Use    Vaping status: Never Used   Substance and Sexual Activity    Alcohol use: Yes     Comment: rarely    Drug use: No    Sexual activity: Yes   Other Topics Concern    Not on file   Social History Narrative    Not on file     Social Determinants of Health     Financial Resource Strain: Low Risk  (2024)    Received from [facility]    Overall Financial Resource Strain (CARDIA)     Difficulty of Paying Living Expenses: Not hard at all   Food Insecurity: No Food Insecurity (2024)    Received from [facility]    Hunger Vital Sign     Worried About Running Out of Food in the Last Year: Never true     Ran Out of Food in the Last Year: Never true   Transportation Needs: No Transportation Needs (2024)    Received from [facility]    PRAPARE - Transportation     Lack of Transportation (Medical): No     Lack of Transportation (Non-Medical): No   Physical Activity: Not on file   Stress: Not on file   Social Connections: Not on file   Intimate Partner Violence: Not At Risk (2024)    Received from [facility]    Humiliation,  Afraid, Rape, and Kick questionnaire     Fear of Current or Ex-Partner: No     Emotionally Abused: No     Physically Abused: No     Sexually Abused: No   Housing Stability: Low Risk  (1/23/2024)    Housing Stability Vital Sign     Unable to Pay for Housing in the Last Year: No     Number of Times Moved in the Last Year: 1     Homeless in the Last Year: No       Current Outpatient Medications:     acetaminophen (TYLENOL) 500 mg tablet, Take 500 mg by mouth every 6 (six) hours as needed for mild pain, Disp: , Rfl:     amLODIPine (NORVASC) 5 mg tablet, Take 5 mg by mouth daily, Disp: , Rfl:     ARIPiprazole (ABILIFY) 10 mg tablet, Take 10 mg by mouth daily, Disp: , Rfl: 0    buPROPion (WELLBUTRIN XL) 150 mg 24 hr tablet, Take 150 mg by mouth in the morning 2 tablets, Disp: , Rfl: 5    cephalexin (KEFLEX) 500 mg capsule, , Disp: , Rfl:     docusate sodium (COLACE) 100 mg capsule, Take 1 capsule (100 mg total) by mouth 2 (two) times a day, Disp: , Rfl:     DULoxetine (CYMBALTA) 20 mg capsule, , Disp: , Rfl:     DULoxetine (CYMBALTA) 60 mg delayed release capsule, Take by mouth daily, Disp: , Rfl:     ezetimibe (ZETIA) 10 mg tablet, Take 10 mg by mouth daily, Disp: , Rfl:     famotidine (PEPCID) 20 mg tablet, Take 1 tablet (20 mg total) by mouth daily, Disp: , Rfl:     lamoTRIgine (LaMICtal) 200 MG tablet, Take 1 tablet (200 mg total) by mouth daily, Disp: , Rfl:     LORazepam (ATIVAN) 1 mg tablet, Take 1 mg by mouth 3 (three) times a day as needed, Disp: , Rfl:     oxybutynin (DITROPAN XL) 15 MG 24 hr tablet, Take 15 mg by mouth daily, Disp: , Rfl:     senna (SENOKOT) 8.6 mg, Take by mouth daily, Disp: , Rfl:     senna-docusate sodium (SENOKOT S) 8.6-50 mg per tablet, Take 1 tablet by mouth 2 (two) times a day, Disp: , Rfl:     amLODIPine (NORVASC) 2.5 mg tablet, Take 1 tablet (2.5 mg total) by mouth daily, Disp: 30 tablet, Rfl: 0    gabapentin (NEURONTIN) 300 mg capsule, Take 300 mg by mouth daily at bedtime (Patient  not taking: Reported on 6/25/2024), Disp: , Rfl:     mupirocin (BACTROBAN) 2 % ointment, apply to affected area twice a day TO EACH NOSTRIL 5 DAYS BEFORE SURGERY (Patient not taking: Reported on 6/25/2024), Disp: , Rfl:     oxyCODONE (ROXICODONE) 5 immediate release tablet, Take 5 mg by mouth every 4 to 6 hours (Patient not taking: Reported on 6/25/2024), Disp: , Rfl:     traMADol (ULTRAM) 50 mg tablet, Take 50 mg by mouth every 6 (six) hours as needed for severe pain (Patient not taking: Reported on 6/25/2024), Disp: , Rfl:   Allergies   Allergen Reactions    Hydromorphone Abdominal Pain and Vomiting    Oxycodone Other (See Comments)    Sulfa Antibiotics Rash     Vitals:    06/25/24 1421   BP: 120/76   Pulse: 105   Temp: (!) 97.3 °F (36.3 °C)   SpO2: 96%       Physical Exam  Vitals reviewed.   Constitutional:       General: She is not in acute distress.     Appearance: Normal appearance. She is not ill-appearing or toxic-appearing.   HENT:      Head: Normocephalic and atraumatic.      Nose: Nose normal.      Mouth/Throat:      Mouth: Mucous membranes are moist.   Eyes:      General: No scleral icterus.  Cardiovascular:      Rate and Rhythm: Normal rate.   Pulmonary:      Effort: Pulmonary effort is normal.   Chest:   Breasts:     Left: Normal.      Comments: Breasts are generally smooth bilaterally. Right breast has periareolar scarring. I do not appreciate any lumps, masses, skin changes, or adenopathy.  Musculoskeletal:         General: Normal range of motion.      Cervical back: Normal range of motion and neck supple.   Lymphadenopathy:      Cervical: No cervical adenopathy.      Upper Body:      Right upper body: No supraclavicular, axillary or pectoral adenopathy.      Left upper body: No supraclavicular, axillary or pectoral adenopathy.   Skin:     General: Skin is warm and dry.      Coloration: Skin is not jaundiced.      Findings: No erythema.   Neurological:      General: No focal deficit present.       Mental Status: She is alert and oriented to person, place, and time.   Psychiatric:         Mood and Affect: Mood normal.         Behavior: Behavior normal.         Thought Content: Thought content normal.         Judgment: Judgment normal.               Imaging  Mammo diagnostic bilateral w 3d & cad, US breast left limited (diagnostic)    Result Date: 6/19/2024  Narrative: DIAGNOSIS: Bloody discharge from left nipple; Intraductal papilloma of breast, right; Atypical ductal hyperplasia of right breast TECHNIQUE: Digital diagnostic mammography was performed. Computer Aided Detection (CAD) analyzed all applicable images. Left breast ultrasound was performed. COMPARISONS: Prior breast imaging dated: 08/07/2023, 08/07/2023, 02/07/2023, 02/07/2023, 01/10/2023, 11/02/2021, 11/02/2021, 09/21/2021, 09/13/2021, 09/13/2021, 08/06/2021, 07/23/2021, 07/14/2021, 07/14/2021, 03/09/2020, 06/18/2018, 06/13/2018, 06/15/2017, 12/15/2016, 07/07/2016, 12/16/2015, 12/08/2015, and 10/30/2014 RELEVANT HISTORY: Family Breast Cancer History: History of breast cancer in Sister. Family Medical History: Family medical history includes breast cancer in sister and colon cancer in mother. Personal History: Hormone history includes birth control. Surgical history includes breast biopsy and lumpectomy. Medical history includes simple breast cyst. RISK ASSESSMENT: 5 Year Tyrer-Cuzick: 1.88% 10 Year Tyrer-Cuzick: 3.98% Lifetime Tyrer-Cuzick: 4.87% TISSUE DENSITY: The breasts are almost entirely fatty. INDICATION: Jeanne Lyon is a 74 y.o. female presenting for left breast mass - history of right breast ADH. FINDINGS: LEFT 1) ARCHITECTURAL DISTORTION [D]: The previously described architectural distortion in the central region of the left breast, only seen on the full lateral projection does not persist, and favored to represent superimposed fibroglandular breast tissue. 2) MASS [E]: Ultrasound of the left breast at the 12:00/periareolar  position demonstrated slight decrease in size of the circumscribed hypoechoic mass which measures 6 x 7 x 6 mm, previously measuring 7 x 9 x 7 mm on 8/27/2023. Right There are no suspicious masses, grouped microcalcifications or areas of unexplained architectural distortion. The skin and nipple areolar complex are unremarkable.  Stable postsurgical changes are present in the right breast.     Impression: One year stability of the probably benign 12:00 left breast mass. No findings of malignancy in the right breast. ASSESSMENT/BI-RADS CATEGORY: Left: 3 - Probably Benign Right: 2 - Benign Overall: 3 - Probably Benign RECOMMENDATION:      - Ultrasound in 1 year for the left breast.      - Diagnostic mammogram in 1 year for both breasts. Workstation ID: [id number]     XR HIP/PELV 1 VW RIGHT W PELVIS IF PERFORMED    Result Date: 6/5/2024  Narrative: Portable AP right hip 0909 hours Indication: Postop Single portable AP view of the right hip shows the presence of a right total hip prostheses. Femoral and acetabular portions of the prostheses are in their expected position.    Impression: Impression: Status post placement of a right total hip prostheses. Workstation:[id number]    I personally reviewed and interpreted the above laboratory and imaging data.

## 2024-06-27 ENCOUNTER — OFFICE VISIT (OUTPATIENT)
Dept: PHYSICAL THERAPY | Facility: REHABILITATION | Age: 74
End: 2024-06-27
Payer: MEDICARE

## 2024-06-27 DIAGNOSIS — M16.11 UNILATERAL PRIMARY OSTEOARTHRITIS, RIGHT HIP: Primary | ICD-10-CM

## 2024-06-27 PROCEDURE — 97112 NEUROMUSCULAR REEDUCATION: CPT

## 2024-06-27 PROCEDURE — 97530 THERAPEUTIC ACTIVITIES: CPT

## 2024-06-27 PROCEDURE — 97110 THERAPEUTIC EXERCISES: CPT

## 2024-06-27 NOTE — PROGRESS NOTES
"Daily Note     Today's date: 2024  Patient name: Jeanne Lyon  : 1950  MRN: [id number]  Referring provider: Mana Taylor DO  Dx:   Encounter Diagnosis     ICD-10-CM    1. Unilateral primary osteoarthritis, right hip  M16.11           Start Time: 1100  Stop Time: 1145  Total time in clinic (min): 45 minutes    Subjective: Patient reports her hip is doing well. States her hip will have some pain when she stands up after sitting for a       Objective: See treatment diary below      Assessment: Tolerated treatment well today. Patient progressing nicely. Progressed activities without issue. Patient would benefit from continued PT      Plan: Continue per plan of care.      Precautions: s/p R TOMMY 24 (anterior precautions), history of L TKA     Manuals 5/16 5/29 6/10 6/12 6/17 6/24 6/27                                     R hip PROM, LH R hip PROM JL       Re-Eval   PRR          Neuro Re-Ed                                                                                                       Ther Ex            Supine Clamshell w/ Band HEP rhb 2x10 3\" hold  Yhb 2x10 3\" hold YHB 2x10x3\" YHB 5\" 3x10 Rhb 2x10 3\" hold      Bridge w/ Hip Abd Iso HEP Rhb 2x10  Yhb 2x10 YHB 2x10 YHB 3x10 Rhb 2x10      L Sidelying Hip Abd HEP 2x10           Resisted Side Stepping HEP 1 lap up/back x3 yb    At mirror 4 laps       Pt edu, HEP, POC   PRR          Rockerboard Taps       AP/ML 1' ea      Supine Marching    2x10  2x10 Stand (ALT) 2x10 2 HHA Standing sanddune 1' x2 x2 HHA      LAQ    1# 2x10 1# 3x10 1# 5\" 3x10 2# 3x10      Minisquats    3x5 chair + foam use of HHA 3x5 w/ 1 foam 1x10, 1x5 chair+1 foam, NO HHA 3x5 chair no foam no hands      Bike    5' L0 5' 7 min 6' L1      VG  L7 5' DL  L5 5' DL L5 5' DL L6 5 min DL L6 5' DL      SLR Flexion       2x5 AROM with lite A       Ther Activity                  Standing HS stretch w/ stool 10x5\"                     Gait Training                        "                Modalities

## 2024-07-01 ENCOUNTER — OFFICE VISIT (OUTPATIENT)
Dept: PHYSICAL THERAPY | Facility: REHABILITATION | Age: 74
End: 2024-07-01
Payer: MEDICARE

## 2024-07-01 DIAGNOSIS — M16.11 UNILATERAL PRIMARY OSTEOARTHRITIS, RIGHT HIP: Primary | ICD-10-CM

## 2024-07-01 PROCEDURE — 97112 NEUROMUSCULAR REEDUCATION: CPT

## 2024-07-01 PROCEDURE — 97530 THERAPEUTIC ACTIVITIES: CPT

## 2024-07-01 PROCEDURE — 97110 THERAPEUTIC EXERCISES: CPT

## 2024-07-01 NOTE — PROGRESS NOTES
"Daily Note     Today's date: 2024  Patient name: Jeanne Lyon  : 1950  MRN: [id number]  Referring provider: Mana Taylor DO  Dx:   Encounter Diagnosis     ICD-10-CM    1. Unilateral primary osteoarthritis, right hip  M16.11             Start Time: 930  Stop Time: 1015  Total time in clinic (min): 45 minutes      Subjective: Patient reports she is doing well. Walking can be challenging but she went for a walk that was just under a mile, used a walker for this.       Objective: See treatment diary below      Assessment: Tolerated treatment well today. Patient progressing toward goals.Continues to demonstrate proper technique of all exercises. Appropriately challenged by progression of VG and addition of standing hip abduction. Patient would benefit from continued PT      Plan: Continue per plan of care.     Treatment by Antonella Tarango SPT, while being supervised by Brett García PT.        Precautions: s/p R TOMMY 24 (anterior precautions), history of L TKA     Manuals 5/16 5/29 6/10 6/12 6/17 6/24 6/27 7/1                                    R hip PROM, LH R hip PROM JL       Re-Eval   PRR          Neuro Re-Ed                                                                                                       Ther Ex            Supine Clamshell w/ Band HEP rhb 2x10 3\" hold  Yhb 2x10 3\" hold YHB 2x10x3\" YHB 5\" 3x10 Rhb 2x10 3\" hold RHB 2x10 3\" hold     Bridge w/ Hip Abd Iso HEP Rhb 2x10  Yhb 2x10 YHB 2x10 YHB 3x10 Rhb 2x10 RHB 2x10     L Sidelying Hip Abd HEP 2x10           Resisted Side Stepping HEP 1 lap up/back x3 yb    At mirror 4 laps       Pt edu, HEP, POC   PRR          Rockerboard Taps       AP/ML 1' ea AP/ML 1'ea      Supine Marching    2x10  2x10 Stand (ALT) 2x10 2 HHA Standing sanddune 1' x2 x2 HHA Standing sanddune 1' x2 x2 HHA     LAQ    1# 2x10 1# 3x10 1# 5\" 3x10 2# 3x10 2# 3x10     Minisquats    3x5 chair + foam use of HHA 3x5 w/ 1 foam 1x10, 1x5 chair+1 " "foam, NO HHA 3x5 chair no foam no hands 3x5 chair no foam no hands     Bike    5' L0 5' 7 min 6' L1 L1 6'     VG  L7 5' DL  L5 5' DL L5 5' DL L6 5 min DL L6 5' DL L7 5' DL     SLR Flexion       2x5 AROM with lite A       Standing hip abduction        2# 2x10      Ther Activity                  Standing HS stretch w/ stool 10x5\"                     Gait Training                                       Modalities                                                "

## 2024-07-03 ENCOUNTER — OFFICE VISIT (OUTPATIENT)
Dept: PHYSICAL THERAPY | Facility: REHABILITATION | Age: 74
End: 2024-07-03
Payer: MEDICARE

## 2024-07-03 DIAGNOSIS — M16.11 UNILATERAL PRIMARY OSTEOARTHRITIS, RIGHT HIP: Primary | ICD-10-CM

## 2024-07-03 PROCEDURE — 97110 THERAPEUTIC EXERCISES: CPT

## 2024-07-03 PROCEDURE — 97530 THERAPEUTIC ACTIVITIES: CPT

## 2024-07-03 PROCEDURE — 97112 NEUROMUSCULAR REEDUCATION: CPT

## 2024-07-03 NOTE — PROGRESS NOTES
"Daily Note     Today's date: 7/3/2024  Patient name: Jeanne Lyon  : 1950  MRN: [id number]  Referring provider: Mana Taylor DO  Dx:   Encounter Diagnosis     ICD-10-CM    1. Unilateral primary osteoarthritis, right hip  M16.11               Start Time: 0930  Stop Time: 1010  Total time in clinic (min): 40 minutes      Subjective: No reports of pain just stiffness since last visit.        Objective: See treatment diary below      Assessment: Tolerated treatment well today. Progressions were appropriately challenging with mild fatigue demonstrated post STS with 3# KB. Upon observation, stiffness following prolonged sitting or standing has decreased in duration. Did not perform VG secondary to this machine being unavailable at this time. Instead, performed leg press. Patient demonstrated proper technique of the leg press without any increases in pain or discomfort. Decreased stiffness reported at end of session. Continue to progress as tolerated. Patient would benefit from continued PT to address limitations in order to maximize function and meet long term goals.       Plan: Continue per plan of care.     Treatment by Antonella Tarango SPT, while being supervised by Brett García PT.        Precautions: s/p R TOMMY 24 (anterior precautions), history of L TKA     Manuals 5/16 5/29 6/10 6/12 6/17 6/24 6/27 7 7/3    FOTO         perf                      R hip PROM, LH R hip PROM JL       Re-Eval   PRR          Neuro Re-Ed                                                                                                       Ther Ex        7/3    Supine Clamshell w/ Band HEP rhb 2x10 3\" hold  Yhb 2x10 3\" hold YHB 2x10x3\" YHB 5\" 3x10 Rhb 2x10 3\" hold RHB 2x10 3\" hold RHB 2x10 3\" hold    Bridge w/ Hip Abd Iso HEP Rhb 2x10  Yhb 2x10 YHB 2x10 YHB 3x10 Rhb 2x10 RHB 2x10 RHB 2x10     L Sidelying Hip Abd HEP 2x10           Resisted Side Stepping HEP 1 lap up/back x3 yb    At mirror 4 laps " "      Pt edu, HEP, POC   PRR          Rockerboard Taps       AP/ML 1' ea AP/ML 1'ea  AP/ML 1' ea    Supine Marching    2x10  2x10 Stand (ALT) 2x10 2 HHA Standing sanddune 1' x2 x2 HHA Standing sanddune 1' x2 x2 HHA Standing sandune 1' x2 x2 HHA     LAQ    1# 2x10 1# 3x10 1# 5\" 3x10 2# 3x10 2# 3x10 3# 3x10    Minisquats    3x5 chair + foam use of HHA 3x5 w/ 1 foam 1x10, 1x5 chair+1 foam, NO HHA 3x5 chair no foam no hands 3x5 chair no foam no hands 3# KB   3x5 chair no foam     Bike    5' L0 5' 7 min 6' L1 L1 6' L1 6'    VG  L7 5' DL  L5 5' DL L5 5' DL L6 5 min DL L6 5' DL L7 5' DL     Leg pres         60# 2x10    70# 2x10    SLR Flexion       2x5 AROM with lite A       Standing hip abduction        2# 2x10  2# 2x10    Step ups              Ther Activity                  Standing HS stretch w/ stool 10x5\"                     Gait Training                                       Modalities                                                          "

## 2024-07-08 ENCOUNTER — APPOINTMENT (OUTPATIENT)
Dept: PHYSICAL THERAPY | Facility: REHABILITATION | Age: 74
End: 2024-07-08
Payer: MEDICARE

## 2024-07-10 ENCOUNTER — OFFICE VISIT (OUTPATIENT)
Dept: PHYSICAL THERAPY | Facility: REHABILITATION | Age: 74
End: 2024-07-10
Payer: MEDICARE

## 2024-07-10 DIAGNOSIS — M16.11 UNILATERAL PRIMARY OSTEOARTHRITIS, RIGHT HIP: Primary | ICD-10-CM

## 2024-07-10 PROCEDURE — 97110 THERAPEUTIC EXERCISES: CPT

## 2024-07-10 PROCEDURE — 97530 THERAPEUTIC ACTIVITIES: CPT

## 2024-07-10 PROCEDURE — 97112 NEUROMUSCULAR REEDUCATION: CPT

## 2024-07-10 NOTE — LETTER
July 10, 2024    Mana Taylor DO  [address] [facility] [address]    Patient: Jeanne Lyon   YOB: 1950   Date of Visit: 7/10/2024     Encounter Diagnosis     ICD-10-CM    1. Unilateral primary osteoarthritis, right hip  M16.11           Dear Dr. Taylor:    Thank you for your recent referral of Jeanne Lyon. Please review the attached evaluation summary from Jeanne's recent visit.     Please verify that you agree with the plan of care by signing the attached order.     If you have any questions or concerns, please do not hesitate to call.     I sincerely appreciate the opportunity to share in the care of one of your patients and hope to have another opportunity to work with you in the near future.       Sincerely,    Brett García PT      Referring Provider:      I certify that I have read the below Plan of Care and certify the need for these services furnished under this plan of treatment while under my care.                    Mana Taylor DO  [address] [facility] [address]  Via Fax: [phone]          Progress / Daily Note     Today's date: 7/10/2024  Patient name: Jeanne Lyon  : 1950  MRN: [id number]  Referring provider: Mana Taylor DO  Dx:   Encounter Diagnosis     ICD-10-CM    1. Unilateral primary osteoarthritis, right hip  M16.11           Start Time: 1645  Stop Time: 1727  Total time in clinic (min): 42 minutes    Subjective: Patient reports overall she is doing well and pleased with her progress. Patient reports stiffness following sitting is the main impairment she still experiences at this time.       GROC: :80%    Pain Levels  Current/Average: 0-1/10  Worst: 4/10    Objective: See treatment diary below  Range of Motion  Right Hip:  Flexion 110  Abduction 40  ER 40  IR 25  Extension 0    Left Hip:  Flexion 110  Abduction 45  ER 50  IR 15  Extension 0      Myotomes/Strength Testing  Right Knee:   Ext (5/5), Flex (5/5)    Left  "Knee:  Ext (5/5), Flex (/55)    Rip Hip:   Ext (NT/5), Flex (4+/5), Abd (NT/5)    Left Hip:   Ext (NT/5), Flex (5/5), Abd (NT/5)      Dermatomes  Sensation Intact Bilaterally      Neurodynamic Testing  SLR (tightness)  Slump Test (-)   Femoral Nerve Tension Test (-)      Assessment:   Patient has actively participated in 10 visits of skilled physical therapy over the last 2 months. Patient has made good improvements in her right hip, strength, function, and gait. Patient GROC is 80% improved at this time. Patient still experiences stiffness in her hip following sitting. Patient would benefit from continued PT to facilitate a full return to her PLOF.      Goals  Short Term Goals (Week 4):  1. Decreased pain by 50%  2. Improve ROM by 10 degrees   3. Improve strength by 1/2 measure      Long Term Goals (8 weeks):  1. GROC >75%  2. Patient will exceed FOTO predicted outcome score  3. Patient will be fully independent with HEP by discharge  4. Patient will be able to manage symptoms independently.         Plan: Continue per plan of care.      Precautions: s/p R TOMMY 6/5/24 (anterior precautions), history of L TKA     Manuals 5/16 5/29 6/10 6/12 6/17 6/24 6/27 7/1 7/3 7/10   FOTO         perf                      R hip PROM, LH R hip PROM JL       Re-Eval   PRR       PRR   Neuro Re-Ed     6/17 6/24                                                                                                  Ther Ex     6/17 6/24   7/3    Supine Clamshell w/ Band HEP rhb 2x10 3\" hold  Yhb 2x10 3\" hold YHB 2x10x3\" YHB 5\" 3x10 Rhb 2x10 3\" hold RHB 2x10 3\" hold RHB 2x10 3\" hold Bhb 2x10 3\" hold   Bridge w/ Hip Abd Iso HEP Rhb 2x10  Yhb 2x10 YHB 2x10 YHB 3x10 Rhb 2x10 RHB 2x10 RHB 2x10  Bhb 2x10    L Sidelying Hip Abd HEP 2x10           Resisted Side Stepping HEP 1 lap up/back x3 yb    At mirror 4 laps       Pt edu, HEP, POC   PRR          Rockerboard Taps       AP/ML 1' ea AP/ML 1'ea  AP/ML 1' ea AP/ML 1' ea   Supine Marching    2x10  2x10 " "Stand (ALT) 2x10 2 HHA Standing sanddune 1' x2 x2 HHA Standing sanddune 1' x2 x2 HHA Standing sandune 1' x2 x2 HHA  Standing sandune 1' x2 x2 HHA    LAQ    1# 2x10 1# 3x10 1# 5\" 3x10 2# 3x10 2# 3x10 3# 3x10 4# 3x10   Minisquats    3x5 chair + foam use of HHA 3x5 w/ 1 foam 1x10, 1x5 chair+1 foam, NO HHA 3x5 chair no foam no hands 3x5 chair no foam no hands 3# KB   3x5 chair no foam  1x9  1x10 to low plinth    Bike    5' L0 5' 7 min 6' L1 L1 6' L1 6' L1 8'   VG  L7 5' DL  L5 5' DL L5 5' DL L6 5 min DL L6 5' DL L7 5' DL     Leg pres         60# 2x10    70# 2x10 70# 2x10 DL   SLR Flexion       2x5 AROM with lite A       Standing hip abduction        2# 2x10  2# 2x10 Np resume   Step ups              Ther Activity                  Standing HS stretch w/ stool 10x5\"                     Gait Training                                       Modalities                                                                            "

## 2024-07-10 NOTE — PROGRESS NOTES
Progress / Daily Note     Today's date: 7/10/2024  Patient name: Jeanne Lyon  : 1950  MRN: [id number]  Referring provider: Mana Taylor DO  Dx:   Encounter Diagnosis     ICD-10-CM    1. Unilateral primary osteoarthritis, right hip  M16.11           Start Time: 1645  Stop Time: 1727  Total time in clinic (min): 42 minutes    Subjective: Patient reports overall she is doing well and pleased with her progress. Patient reports stiffness following sitting is the main impairment she still experiences at this time.       GROC: :80%    Pain Levels  Current/Average: 0-1/10  Worst: 4/10    Objective: See treatment diary below  Range of Motion  Right Hip:  Flexion 110  Abduction 40  ER 40  IR 25  Extension 0    Left Hip:  Flexion 110  Abduction 45  ER 50  IR 15  Extension 0      Myotomes/Strength Testing  Right Knee:   Ext (5/5), Flex (5/5)    Left Knee:  Ext (5/5), Flex (/55)    Rip Hip:   Ext (NT/5), Flex (4+/5), Abd (NT/5)    Left Hip:   Ext (NT/5), Flex (5/5), Abd (NT/5)      Dermatomes  Sensation Intact Bilaterally      Neurodynamic Testing  SLR (tightness)  Slump Test (-)   Femoral Nerve Tension Test (-)      Assessment:   Patient has actively participated in 10 visits of skilled physical therapy over the last 2 months. Patient has made good improvements in her right hip, strength, function, and gait. Patient GROC is 80% improved at this time. Patient still experiences stiffness in her hip following sitting. Patient would benefit from continued PT to facilitate a full return to her PLOF.      Goals  Short Term Goals (Week 4):  1. Decreased pain by 50%  2. Improve ROM by 10 degrees   3. Improve strength by 1/2 measure      Long Term Goals (8 weeks):  1. GROC >75%  2. Patient will exceed FOTO predicted outcome score  3. Patient will be fully independent with HEP by discharge  4. Patient will be able to manage symptoms independently.         Plan: Continue per plan of care.      Precautions: s/p R TOMMY  "6/5/24 (anterior precautions), history of L TKA     Manuals 5/16 5/29 6/10 6/12 6/17 6/24 6/27 7/1 7/3 7/10   FOTO         perf                      R hip PROM, LH R hip PROM JL       Re-Eval   PRR       PRR   Neuro Re-Ed     6/17 6/24                                                                                                  Ther Ex     6/17 6/24   7/3    Supine Clamshell w/ Band HEP rhb 2x10 3\" hold  Yhb 2x10 3\" hold YHB 2x10x3\" YHB 5\" 3x10 Rhb 2x10 3\" hold RHB 2x10 3\" hold RHB 2x10 3\" hold Bhb 2x10 3\" hold   Bridge w/ Hip Abd Iso HEP Rhb 2x10  Yhb 2x10 YHB 2x10 YHB 3x10 Rhb 2x10 RHB 2x10 RHB 2x10  Bhb 2x10    L Sidelying Hip Abd HEP 2x10           Resisted Side Stepping HEP 1 lap up/back x3 yb    At mirror 4 laps       Pt edu, HEP, POC   PRR          Rockerboard Taps       AP/ML 1' ea AP/ML 1'ea  AP/ML 1' ea AP/ML 1' ea   Supine Marching    2x10  2x10 Stand (ALT) 2x10 2 HHA Standing sanddune 1' x2 x2 HHA Standing sanddune 1' x2 x2 HHA Standing sandune 1' x2 x2 HHA  Standing sandune 1' x2 x2 HHA    LAQ    1# 2x10 1# 3x10 1# 5\" 3x10 2# 3x10 2# 3x10 3# 3x10 4# 3x10   Minisquats    3x5 chair + foam use of HHA 3x5 w/ 1 foam 1x10, 1x5 chair+1 foam, NO HHA 3x5 chair no foam no hands 3x5 chair no foam no hands 3# KB   3x5 chair no foam  1x9  1x10 to low plinth    Bike    5' L0 5' 7 min 6' L1 L1 6' L1 6' L1 8'   VG  L7 5' DL  L5 5' DL L5 5' DL L6 5 min DL L6 5' DL L7 5' DL     Leg pres         60# 2x10    70# 2x10 70# 2x10 DL   SLR Flexion       2x5 AROM with lite A       Standing hip abduction        2# 2x10  2# 2x10 Np resume   Step ups              Ther Activity                  Standing HS stretch w/ stool 10x5\"                     Gait Training                                       Modalities                                                            "

## 2024-07-11 ENCOUNTER — APPOINTMENT (OUTPATIENT)
Dept: PHYSICAL THERAPY | Facility: REHABILITATION | Age: 74
End: 2024-07-11
Payer: MEDICARE

## 2024-07-15 ENCOUNTER — OFFICE VISIT (OUTPATIENT)
Dept: PHYSICAL THERAPY | Facility: REHABILITATION | Age: 74
End: 2024-07-15
Payer: MEDICARE

## 2024-07-15 DIAGNOSIS — M16.11 UNILATERAL PRIMARY OSTEOARTHRITIS, RIGHT HIP: Primary | ICD-10-CM

## 2024-07-15 PROCEDURE — 97112 NEUROMUSCULAR REEDUCATION: CPT

## 2024-07-15 PROCEDURE — 97110 THERAPEUTIC EXERCISES: CPT

## 2024-07-15 PROCEDURE — 97530 THERAPEUTIC ACTIVITIES: CPT

## 2024-07-15 NOTE — PROGRESS NOTES
"Progress / Daily Note     Today's date: 7/15/2024  Patient name: Jeanne Lyon  : 1950  MRN: [id number]  Referring provider: Mana Taylor DO  Dx:   Encounter Diagnosis     ICD-10-CM    1. Unilateral primary osteoarthritis, right hip  M16.11             Start Time: 1515  Stop Time: 1555  Total time in clinic (min): 40 minutes    Subjective: Patient reports she has been feeling less stiff and is overall doing well.       Objective: See treatment diary below      Assessment: Tolerated treatment well today. Progressed LAQ to 5# as patient uses a 5# weight at home. Patient uses 1 finger on each hand to support herself during sanddune marching and rockerboard taps. Continues to be making progress. Patient would benefit from continued skilled PT services.       Plan: Continue per plan of care.      Treatment by Antonella Tarango SPT, while being supervised by Brett García PT.       Precautions: s/p R TOMMY 24 (anterior precautions), history of L TKA     Manuals 7/15 5/29 6/10 6/12 6/17 6/24 6/27 7/1 7/3 7/10   FOTO         perf                      R hip PROM, LH R hip PROM JL       Re-Eval   PRR       PRR   Neuro Re-Ed                                                                                                       Ther Ex     6/17 6/24   7/3    Supine Clamshell w/ Band Bhb 2x10 3\" hold rhb 2x10 3\" hold  Yhb 2x10 3\" hold YHB 2x10x3\" YHB 5\" 3x10 Rhb 2x10 3\" hold RHB 2x10 3\" hold RHB 2x10 3\" hold Bhb 2x10 3\" hold   Bridge w/ Hip Abd Iso Bhb 2x10 Rhb 2x10  Yhb 2x10 YHB 2x10 YHB 3x10 Rhb 2x10 RHB 2x10 RHB 2x10  Bhb 2x10    L Sidelying Hip Abd  2x10           Resisted Side Stepping  1 lap up/back x3 yb    At mirror 4 laps       Pt edu, HEP, POC   PRR          Rockerboard Taps AP/ML 1' ea      AP/ML 1' ea AP/ML 1'ea  AP/ML 1' ea AP/ML 1' ea   Supine Marching Standing sandune 1' x2 x2 HHA    2x10  2x10 Stand (ALT) 2x10 2 HHA Standing sanddune 1' x2 x2 HHA Standing sanddune 1' x2 x2 HHA Standing " "sandune 1' x2 x2 HHA  Standing sandune 1' x2 x2 HHA    LAQ 5# 3x10    1# 2x10 1# 3x10 1# 5\" 3x10 2# 3x10 2# 3x10 3# 3x10 4# 3x10   Minisquats 2x10 to low plinth, wider stance    3x5 chair + foam use of HHA 3x5 w/ 1 foam 1x10, 1x5 chair+1 foam, NO HHA 3x5 chair no foam no hands 3x5 chair no foam no hands 3# KB   3x5 chair no foam  1x9  1x10 to low plinth    Bike L1 8'    5' L0 5' 7 min 6' L1 L1 6' L1 6' L1 8'   VG  L7 5' DL  L5 5' DL L5 5' DL L6 5 min DL L6 5' DL L7 5' DL     Leg pres 70# 2x10 DL    80# 1x10 DL        60# 2x10    70# 2x10 70# 2x10 DL   SLR Flexion       2x5 AROM with lite A       Standing hip abduction 2# 2x10       2# 2x10  2# 2x10 Np resume   Step ups              Ther Activity                  Standing HS stretch w/ stool 10x5\"                     Gait Training                                       Modalities                                                            "

## 2024-07-18 ENCOUNTER — OFFICE VISIT (OUTPATIENT)
Dept: PHYSICAL THERAPY | Facility: REHABILITATION | Age: 74
End: 2024-07-18
Payer: MEDICARE

## 2024-07-18 DIAGNOSIS — M16.11 UNILATERAL PRIMARY OSTEOARTHRITIS, RIGHT HIP: Primary | ICD-10-CM

## 2024-07-18 PROCEDURE — 97112 NEUROMUSCULAR REEDUCATION: CPT

## 2024-07-18 PROCEDURE — 97530 THERAPEUTIC ACTIVITIES: CPT

## 2024-07-18 PROCEDURE — 97110 THERAPEUTIC EXERCISES: CPT

## 2024-07-18 NOTE — PROGRESS NOTES
"Daily Note     Today's date: 2024  Patient name: Jeanne Lyon  : 1950  MRN: [id number]  Referring provider: Mana Taylor DO  Dx:   Encounter Diagnosis     ICD-10-CM    1. Unilateral primary osteoarthritis, right hip  M16.11           Start Time: 09  Stop Time: 1015  Total time in clinic (min): 45 minutes    Subjective: Patient reports her last impairment is getting up and down from low chair/seat heights.       Objective: See treatment diary below      Assessment: Tolerated treatment well today. Slight reductions in low depth STS with band around knees to facilitate increased gluteal activation. Patient is doing very well, progress as tolerated. Patient would benefit from continued PT      Plan: Continue per plan of care.      Treatment by Antonella Tarango SPT, while being supervised by Brett García PT.       Precautions: s/p R TOMMY 24 (anterior precautions), history of L TKA     Manuals 7/15 7/18   6/17 6/24 6/27 7/1 7/3 7/10   FOTO         perf                      R hip PROM, LH R hip PROM JL       Re-Eval          PRR   Neuro Re-Ed                                      Ther Ex        7/3    Supine Clamshell w/ Band Bhb 2x10 3\" hold Bhb 3x10 3\" hold   YHB 2x10x3\" YHB 5\" 3x10 Rhb 2x10 3\" hold RHB 2x10 3\" hold RHB 2x10 3\" hold Bhb 2x10 3\" hold   Bridge w/ Hip Abd Iso Bhb 2x10 Bhb 3x10    YHB 2x10 YHB 3x10 Rhb 2x10 RHB 2x10 RHB 2x10  Bhb 2x10    Pt edu, HEP, POC             Rockerboard Taps AP/ML 1' ea AP/ML 1' ea     AP/ML 1' ea AP/ML 1'ea  AP/ML 1' ea AP/ML 1' ea   Sanddune Marching 1' x2 x2 HHA  1' x2 x2 HHA    2x10 Stand (ALT) 2x10 2 HHA Standing sanddune 1' x2 x2 HHA Standing sanddune 1' x2 x2 HHA Standing sandune 1' x2 x2 HHA  Standing sandune 1' x2 x2 HHA    LAQ 5# 3x10  5# 3x10   1# 3x10 1# 5\" 3x10 2# 3x10 2# 3x10 3# 3x10 4# 3x10   Minisquats 2x10 to low plinth, wider stance  2x5 yb around knees   3x5 w/ 1 foam 1x10, 1x5 chair+1 foam, NO HHA 3x5 chair no foam " "no hands 3x5 chair no foam no hands 3# KB   3x5 chair no foam  1x9  1x10 to low plinth    Bike L1 8'  L1 10'   5' 7 min 6' L1 L1 6' L1 6' L1 8'   Leg pres 70# 2x10 DL    80# 1x10 DL 80# 3x10 DL       60# 2x10    70# 2x10 70# 2x10 DL   Standing hip abduction 2# 2x10 2# 2x10      2# 2x10  2# 2x10 Np resume   Step ups              Ther Activity                  Standing HS stretch w/ stool 10x5\"                     Gait Training                                       Modalities                                                              "

## 2024-07-22 ENCOUNTER — OFFICE VISIT (OUTPATIENT)
Dept: PHYSICAL THERAPY | Facility: REHABILITATION | Age: 74
End: 2024-07-22
Payer: MEDICARE

## 2024-07-22 DIAGNOSIS — M16.11 UNILATERAL PRIMARY OSTEOARTHRITIS, RIGHT HIP: Primary | ICD-10-CM

## 2024-07-22 PROCEDURE — 97112 NEUROMUSCULAR REEDUCATION: CPT

## 2024-07-22 PROCEDURE — 97530 THERAPEUTIC ACTIVITIES: CPT

## 2024-07-22 PROCEDURE — 97110 THERAPEUTIC EXERCISES: CPT

## 2024-07-22 NOTE — PROGRESS NOTES
"Progress/Discharge Note     Today's date: 2024  Patient name: Jeanne Lyon  : 1950  MRN: [id number]  Referring provider: Mana Taylor DO  Dx:   Encounter Diagnosis     ICD-10-CM    1. Unilateral primary osteoarthritis, right hip  M16.11           Start Time: 935  Stop Time: 1015  Total time in clinic (min): 40 minutes    Subjective: Patient reports she is not having any pain at this time. Reports she can get up and down from low seat heights without issue. Patient reports she feels ready for discharge today.    Pain Levels  Current: 0/10      Objective: See treatment diary below      Assessment:   Patient has met all goals established at  at this time. Patient continues to make progress in her function, ROM, and strength with each week. Patient is not limited by her right hip at this time. Patient will f/u with PT as needed.        Goals  Short Term Goals (Week 4):  1. Decreased pain by 50%  2. Improve ROM by 10 degrees   3. Improve strength by 1/2 measure      Long Term Goals (8 weeks):  1. GROC >75%  2. Patient will exceed FOTO predicted outcome score  3. Patient will be fully independent with [facility] by discharge  4. Patient will be able to manage symptoms independently.       Plan: Discharge to [facility] at this time.          Precautions: s/p R TOMMY 24 (anterior precautions), history of L TKA     Manuals 7/15 7/18 7/22  6/17 6/24 6/27 7 7/3 7/10   FOTO         perf                      R hip PROM, LH R hip PROM JL       Re-Eval          PRR   Neuro Re-Ed                                      Ther Ex        7/3    Supine Clamshell w/ Band Bhb 2x10 3\" hold Bhb 3x10 3\" hold Bhb 3x10 3\" hold  YHB 2x10x3\" YHB 5\" 3x10 Rhb 2x10 3\" hold RHB 2x10 3\" hold RHB 2x10 3\" hold Bhb 2x10 3\" hold   Bridge w/ Hip Abd Iso Bhb 2x10 Bhb 3x10  Bhb 3x10   YHB 2x10 YHB 3x10 Rhb 2x10 RHB 2x10 RHB 2x10  Bhb 2x10    Pt edu, HEP, POC             Rockerboard Taps AP/ML 1' ea AP/ML 1' ea AP/ML 1' ea    " "AP/ML 1' ea AP/ML 1'ea  AP/ML 1' ea AP/ML 1' ea   Sanddune Marching 1' x2 x2 HHA  1' x2 x2 HHA  1' x2 x2 HHA  2x10 Stand (ALT) 2x10 2 HHA Standing sanddune 1' x2 x2 HHA Standing sanddune 1' x2 x2 HHA Standing sandune 1' x2 x2 HHA  Standing sandune 1' x2 x2 HHA    LAQ 5# 3x10  5# 3x10 5# 3x10  1# 3x10 1# 5\" 3x10 2# 3x10 2# 3x10 3# 3x10 4# 3x10   Minisquats 2x10 to low plinth, wider stance  2x5 yb around knees Np resume  3x5 w/ 1 foam 1x10, 1x5 chair+1 foam, NO HHA 3x5 chair no foam no hands 3x5 chair no foam no hands 3# KB   3x5 chair no foam  1x9  1x10 to low plinth    Bike L1 8'  L1 10' L1 10'  5' 7 min 6' L1 L1 6' L1 6' L1 8'   Leg pres 70# 2x10 DL    80# 1x10 DL 80# 3x10 DL Np resume      60# 2x10    70# 2x10 70# 2x10 DL   Standing hip abduction 2# 2x10 2# 2x10 2# 2x10     2# 2x10  2# 2x10 Np resume   Step ups              Ther Activity                  Standing HS stretch w/ stool 10x5\"                     Gait Training                                       Modalities                                                                "

## 2024-07-25 ENCOUNTER — APPOINTMENT (OUTPATIENT)
Dept: PHYSICAL THERAPY | Facility: REHABILITATION | Age: 74
End: 2024-07-25
Payer: MEDICARE

## 2024-07-29 ENCOUNTER — APPOINTMENT (OUTPATIENT)
Dept: PHYSICAL THERAPY | Facility: REHABILITATION | Age: 74
End: 2024-07-29
Payer: MEDICARE

## 2025-02-14 ENCOUNTER — APPOINTMENT (OUTPATIENT)
Dept: RADIOLOGY | Facility: CLINIC | Age: 75
End: 2025-02-14
Payer: MEDICARE

## 2025-02-14 DIAGNOSIS — R07.81 RIB PAIN: ICD-10-CM

## 2025-02-14 PROCEDURE — 71101 X-RAY EXAM UNILAT RIBS/CHEST: CPT

## 2025-06-06 ENCOUNTER — TELEPHONE (OUTPATIENT)
Age: 75
End: 2025-06-06

## 2025-06-20 ENCOUNTER — HOSPITAL ENCOUNTER (OUTPATIENT)
Dept: ULTRASOUND IMAGING | Facility: CLINIC | Age: 75
Discharge: HOME | End: 2025-06-20
Payer: MEDICARE

## 2025-06-20 ENCOUNTER — HOSPITAL ENCOUNTER (OUTPATIENT)
Dept: MAMMOGRAPHY | Facility: CLINIC | Age: 75
Discharge: HOME | End: 2025-06-20
Payer: MEDICARE

## 2025-06-20 VITALS — WEIGHT: 173 LBS | BODY MASS INDEX: 28.82 KG/M2 | HEIGHT: 65 IN

## 2025-06-20 DIAGNOSIS — N63.25 MASS OVERLAPPING MULTIPLE QUADRANTS OF LEFT BREAST: ICD-10-CM

## 2025-06-20 DIAGNOSIS — N60.91 ATYPICAL DUCTAL HYPERPLASIA OF RIGHT BREAST: ICD-10-CM

## 2025-06-20 PROCEDURE — G0279 TOMOSYNTHESIS, MAMMO: HCPCS

## 2025-06-20 PROCEDURE — 76642 ULTRASOUND BREAST LIMITED: CPT

## 2025-06-20 PROCEDURE — 77066 DX MAMMO INCL CAD BI: CPT

## 2025-06-24 ENCOUNTER — TELEPHONE (OUTPATIENT)
Dept: SURGICAL ONCOLOGY | Facility: CLINIC | Age: 75
End: 2025-06-24

## 2025-06-24 NOTE — TELEPHONE ENCOUNTER
Called pt. Spoke to Abby to reschedule appt with Yas. Pt stated she does not want to reschedule at this time, moving to [place] on 6/27.

## (undated) DEVICE — CHLORAPREP HI-LITE 26ML ORANGE

## (undated) DEVICE — SUT VICRYL 2-0 CT-1 36 IN J945H

## (undated) DEVICE — SCD SEQUENTIAL COMPRESSION COMFORT SLEEVE MEDIUM KNEE LENGTH: Brand: KENDALL SCD

## (undated) DEVICE — LIGHT HANDLE COVER SLEEVE DISP BLUE STELLAR

## (undated) DEVICE — MAYO STAND COVER: Brand: CONVERTORS

## (undated) DEVICE — INTENDED FOR TISSUE SEPARATION, AND OTHER PROCEDURES THAT REQUIRE A SHARP SURGICAL BLADE TO PUNCTURE OR CUT.: Brand: BARD-PARKER SAFETY BLADES SIZE 15, STERILE

## (undated) DEVICE — OCCLUSIVE GAUZE STRIP,3% BISMUTH TRIBROMOPHENATE IN PETROLATUM BLEND: Brand: XEROFORM

## (undated) DEVICE — SKIN MARKER DUAL TIP WITH RULER CAP, FLEXIBLE RULER AND LABELS: Brand: DEVON

## (undated) DEVICE — THE SIMPULSE SOLO SYSTEM WITH ULTREX RETRACTABLE SPLASH SHIELD TIP: Brand: SIMPULSE SOLO

## (undated) DEVICE — TUBING SUCTION 5MM X 12 FT

## (undated) DEVICE — COBAN 6 IN STERILE

## (undated) DEVICE — ADHESIVE SKIN CLOSR DERMABOND PRINEO

## (undated) DEVICE — SAW BLADE RECIPROCATING 179

## (undated) DEVICE — 3M™ IOBAN™ 2 ANTIMICROBIAL INCISE DRAPE 6648EZ: Brand: IOBAN™ 2

## (undated) DEVICE — BETHLEHEM UNIVERSAL MINOR GEN: Brand: CARDINAL HEALTH

## (undated) DEVICE — WEBRIL 6 IN UNSTERILE

## (undated) DEVICE — GLOVE SRG BIOGEL 8

## (undated) DEVICE — BETHLEHEM UNIV TOTAL KNEE, KIT: Brand: CARDINAL HEALTH

## (undated) DEVICE — FRAZIER SUCTION INSTRUMENT 18 FR W/OBTURATOR, NO CONTROL VENT: Brand: FRAZIER

## (undated) DEVICE — SUT MONOCRYL 3-0 PS-2 27 IN Y427H

## (undated) DEVICE — SUT STRATAFIX SPIRAL PDS PLUS 1 CTX 18 IN SXPP1A400

## (undated) DEVICE — CHEST/BREAST DRAPE: Brand: CONVERTORS

## (undated) DEVICE — DRAPE EQUIPMENT RF WAND

## (undated) DEVICE — DRAPE SHEET THREE QUARTER

## (undated) DEVICE — TRAY FOLEY 16FR URIMETER SURESTEP

## (undated) DEVICE — DRAPE PROBE NEO-PROBE/ULTRASOUND

## (undated) DEVICE — HOOD: Brand: FLYTE, SURGICOOL

## (undated) DEVICE — PREP SURGICAL PURPREP 26ML

## (undated) DEVICE — INTENDED FOR TISSUE SEPARATION, AND OTHER PROCEDURES THAT REQUIRE A SHARP SURGICAL BLADE TO PUNCTURE OR CUT.: Brand: BARD-PARKER ® CARBON RIB-BACK BLADES

## (undated) DEVICE — IMPERVIOUS STOCKINETTE: Brand: DEROYAL

## (undated) DEVICE — 3M™ STERI-STRIP™ REINFORCED ADHESIVE SKIN CLOSURES, R1547, 1/2 IN X 4 IN (12 MM X 100 MM), 6 STRIPS/ENVELOPE: Brand: 3M™ STERI-STRIP™

## (undated) DEVICE — SMOKE EVACUATION TUBING WITH 8 IN INTEGRAL WAND AND SPONGE GUARD: Brand: BUFFALO FILTER

## (undated) DEVICE — CAPIT KNEE ATTUNE RP CEMENT - DEPUY

## (undated) DEVICE — PADDING CAST 6IN COTTON STRL

## (undated) DEVICE — GLOVE INDICATOR PI UNDERGLOVE SZ 7 BLUE

## (undated) DEVICE — GLOVE SRG BIOGEL 6.5

## (undated) DEVICE — CEMENT MIXING BOWL W/SPATULA

## (undated) DEVICE — PENCIL ELECTROSURG E-Z CLEAN -0035H

## (undated) DEVICE — SUT VICRYL 3-0 SH 27 IN J416H

## (undated) DEVICE — ELECTRODE BLADE MOD E-Z CLEAN  2.75IN 7CM -0012AM

## (undated) DEVICE — GLOVE INDICATOR PI UNDERGLOVE SZ 6.5 BLUE

## (undated) DEVICE — STERILE RUBBER BANDS

## (undated) DEVICE — NEEDLE 25G X 1 1/2

## (undated) DEVICE — GLOVE SRG BIOGEL ORTHOPEDIC 7.5

## (undated) DEVICE — SAW BLADE OSCILLATING BRAZOL 167

## (undated) DEVICE — SYRINGE 3ML LL

## (undated) DEVICE — VIOLET BRAIDED (POLYGLACTIN 910), SYNTHETIC ABSORBABLE SUTURE: Brand: COATED VICRYL

## (undated) DEVICE — SUT SILK 2-0 SH 30 IN K833H

## (undated) DEVICE — SUT MONOCRYL 4-0 PS-2 18 IN Y496G

## (undated) DEVICE — SURGICAL GOWN, XL SMARTSLEEVE: Brand: CONVERTORS

## (undated) DEVICE — SPONGE LAP 18 X 18 IN STRL RFD

## (undated) DEVICE — 3M™ DURAPORE™ SURGICAL TAPE 1538-3, 3 INCH X 10 YARD (7,5CM X 9,1M), 4 ROLLS/BOX: Brand: 3M™ DURAPORE™

## (undated) DEVICE — UTILITY MARKER,BLACK WITH LABELS: Brand: DEVON

## (undated) DEVICE — 3000CC GUARDIAN II: Brand: GUARDIAN

## (undated) DEVICE — SUT VICRYL 1 CTX 36 IN J977H

## (undated) DEVICE — SUT SILK 2-0 18 IN A185H

## (undated) DEVICE — 3M™ STERI-DRAPE™ U-DRAPE 1015: Brand: STERI-DRAPE™

## (undated) DEVICE — GLOVE INDICATOR PI UNDERGLOVE SZ 8 BLUE